# Patient Record
Sex: MALE | Race: WHITE | NOT HISPANIC OR LATINO | Employment: FULL TIME
[De-identification: names, ages, dates, MRNs, and addresses within clinical notes are randomized per-mention and may not be internally consistent; named-entity substitution may affect disease eponyms.]

---

## 2017-01-15 ENCOUNTER — RECORDS - HEALTHEAST (OUTPATIENT)
Dept: ADMINISTRATIVE | Facility: OTHER | Age: 57
End: 2017-01-15

## 2017-01-16 ENCOUNTER — RECORDS - HEALTHEAST (OUTPATIENT)
Dept: ADMINISTRATIVE | Facility: OTHER | Age: 57
End: 2017-01-16

## 2017-01-17 ENCOUNTER — COMMUNICATION - HEALTHEAST (OUTPATIENT)
Dept: FAMILY MEDICINE | Facility: CLINIC | Age: 57
End: 2017-01-17

## 2017-01-19 ENCOUNTER — OFFICE VISIT - HEALTHEAST (OUTPATIENT)
Dept: FAMILY MEDICINE | Facility: CLINIC | Age: 57
End: 2017-01-19

## 2017-01-19 DIAGNOSIS — S22.49XA MULTIPLE RIB FRACTURES: ICD-10-CM

## 2017-01-19 DIAGNOSIS — S42.102A LEFT SCAPULA FRACTURE: ICD-10-CM

## 2017-01-20 ENCOUNTER — RECORDS - HEALTHEAST (OUTPATIENT)
Dept: ADMINISTRATIVE | Facility: OTHER | Age: 57
End: 2017-01-20

## 2017-01-25 ENCOUNTER — COMMUNICATION - HEALTHEAST (OUTPATIENT)
Dept: FAMILY MEDICINE | Facility: CLINIC | Age: 57
End: 2017-01-25

## 2017-02-10 ENCOUNTER — RECORDS - HEALTHEAST (OUTPATIENT)
Dept: ADMINISTRATIVE | Facility: OTHER | Age: 57
End: 2017-02-10

## 2017-03-08 ENCOUNTER — RECORDS - HEALTHEAST (OUTPATIENT)
Dept: ADMINISTRATIVE | Facility: OTHER | Age: 57
End: 2017-03-08

## 2017-05-06 ENCOUNTER — COMMUNICATION - HEALTHEAST (OUTPATIENT)
Dept: FAMILY MEDICINE | Facility: CLINIC | Age: 57
End: 2017-05-06

## 2017-05-06 DIAGNOSIS — I10 HYPERTENSION: ICD-10-CM

## 2017-05-12 ENCOUNTER — AMBULATORY - HEALTHEAST (OUTPATIENT)
Dept: FAMILY MEDICINE | Facility: CLINIC | Age: 57
End: 2017-05-12

## 2017-05-22 ENCOUNTER — OFFICE VISIT - HEALTHEAST (OUTPATIENT)
Dept: FAMILY MEDICINE | Facility: CLINIC | Age: 57
End: 2017-05-22

## 2017-05-22 DIAGNOSIS — I10 ESSENTIAL HYPERTENSION: ICD-10-CM

## 2017-05-22 DIAGNOSIS — Z00.00 HEALTHCARE MAINTENANCE: ICD-10-CM
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
## 2017-05-22 LAB
CHOLEST SERPL-MCNC: 180 MG/DL
FASTING STATUS PATIENT QL REPORTED: NORMAL
HBA1C MFR BLD: 5.7 % (ref 3.5–6)
HDLC SERPL-MCNC: 65 MG/DL
LDLC SERPL CALC-MCNC: 93 MG/DL
PSA SERPL-MCNC: 0.5 NG/ML (ref 0–3.5)
TRIGL SERPL-MCNC: 112 MG/DL

## 2017-05-22 ASSESSMENT — MIFFLIN-ST. JEOR: SCORE: 1616.64

## 2017-05-25 ENCOUNTER — COMMUNICATION - HEALTHEAST (OUTPATIENT)
Dept: FAMILY MEDICINE | Facility: CLINIC | Age: 57
End: 2017-05-25

## 2019-01-31 ENCOUNTER — COMMUNICATION - HEALTHEAST (OUTPATIENT)
Dept: FAMILY MEDICINE | Facility: CLINIC | Age: 59
End: 2019-01-31

## 2019-01-31 DIAGNOSIS — I10 ESSENTIAL HYPERTENSION: ICD-10-CM

## 2019-02-06 ENCOUNTER — OFFICE VISIT - HEALTHEAST (OUTPATIENT)
Dept: FAMILY MEDICINE | Facility: CLINIC | Age: 59
End: 2019-02-06

## 2019-02-06 DIAGNOSIS — I10 ESSENTIAL HYPERTENSION: ICD-10-CM

## 2019-02-06 DIAGNOSIS — Z12.11 SCREEN FOR COLON CANCER: ICD-10-CM

## 2019-02-06 LAB
ANION GAP SERPL CALCULATED.3IONS-SCNC: 9 MMOL/L (ref 5–18)
BUN SERPL-MCNC: 15 MG/DL (ref 8–22)
CALCIUM SERPL-MCNC: 9.9 MG/DL (ref 8.5–10.5)
CHLORIDE BLD-SCNC: 104 MMOL/L (ref 98–107)
CHOLEST SERPL-MCNC: 186 MG/DL
CO2 SERPL-SCNC: 28 MMOL/L (ref 22–31)
CREAT SERPL-MCNC: 0.92 MG/DL (ref 0.7–1.3)
FASTING STATUS PATIENT QL REPORTED: NO
GFR SERPL CREATININE-BSD FRML MDRD: >60 ML/MIN/1.73M2
GLUCOSE BLD-MCNC: 97 MG/DL (ref 70–125)
HBA1C MFR BLD: 5.5 % (ref 3.5–6)
HDLC SERPL-MCNC: 67 MG/DL
LDLC SERPL CALC-MCNC: 98 MG/DL
POTASSIUM BLD-SCNC: 4.1 MMOL/L (ref 3.5–5)
SODIUM SERPL-SCNC: 141 MMOL/L (ref 136–145)
TRIGL SERPL-MCNC: 105 MG/DL

## 2019-02-06 ASSESSMENT — MIFFLIN-ST. JEOR: SCORE: 1661.72

## 2019-02-10 ENCOUNTER — COMMUNICATION - HEALTHEAST (OUTPATIENT)
Dept: FAMILY MEDICINE | Facility: CLINIC | Age: 59
End: 2019-02-10

## 2019-07-10 ENCOUNTER — COMMUNICATION - HEALTHEAST (OUTPATIENT)
Dept: FAMILY MEDICINE | Facility: CLINIC | Age: 59
End: 2019-07-10

## 2019-08-29 ENCOUNTER — COMMUNICATION - HEALTHEAST (OUTPATIENT)
Dept: FAMILY MEDICINE | Facility: CLINIC | Age: 59
End: 2019-08-29

## 2019-08-29 DIAGNOSIS — I10 ESSENTIAL HYPERTENSION: ICD-10-CM

## 2020-01-20 ENCOUNTER — COMMUNICATION - HEALTHEAST (OUTPATIENT)
Dept: FAMILY MEDICINE | Facility: CLINIC | Age: 60
End: 2020-01-20

## 2020-05-27 ENCOUNTER — COMMUNICATION - HEALTHEAST (OUTPATIENT)
Dept: FAMILY MEDICINE | Facility: CLINIC | Age: 60
End: 2020-05-27

## 2020-05-27 DIAGNOSIS — I10 ESSENTIAL HYPERTENSION: ICD-10-CM

## 2020-06-12 ENCOUNTER — OFFICE VISIT - HEALTHEAST (OUTPATIENT)
Dept: FAMILY MEDICINE | Facility: CLINIC | Age: 60
End: 2020-06-12

## 2020-06-12 DIAGNOSIS — I10 ESSENTIAL HYPERTENSION: ICD-10-CM

## 2021-01-14 ENCOUNTER — HOSPITAL ENCOUNTER (OUTPATIENT)
Facility: CLINIC | Age: 61
Setting detail: OBSERVATION
Discharge: HOME | End: 2021-01-15
Attending: FAMILY MEDICINE | Admitting: INTERNAL MEDICINE
Payer: COMMERCIAL

## 2021-01-14 ENCOUNTER — APPOINTMENT (OUTPATIENT)
Dept: GENERAL RADIOLOGY | Facility: CLINIC | Age: 61
End: 2021-01-14
Attending: FAMILY MEDICINE
Payer: COMMERCIAL

## 2021-01-14 ENCOUNTER — APPOINTMENT (OUTPATIENT)
Dept: CT IMAGING | Facility: CLINIC | Age: 61
End: 2021-01-14
Attending: FAMILY MEDICINE
Payer: COMMERCIAL

## 2021-01-14 ENCOUNTER — APPOINTMENT (OUTPATIENT)
Dept: ULTRASOUND IMAGING | Facility: CLINIC | Age: 61
End: 2021-01-14
Attending: FAMILY MEDICINE
Payer: COMMERCIAL

## 2021-01-14 DIAGNOSIS — Z11.52 ENCOUNTER FOR SCREENING LABORATORY TESTING FOR SEVERE ACUTE RESPIRATORY SYNDROME CORONAVIRUS 2 (SARS-COV-2): ICD-10-CM

## 2021-01-14 DIAGNOSIS — K57.92 DIVERTICULITIS: Primary | ICD-10-CM

## 2021-01-14 DIAGNOSIS — K38.2: ICD-10-CM

## 2021-01-14 DIAGNOSIS — F10.20 ACUTE ALCOHOLISM (H): ICD-10-CM

## 2021-01-14 DIAGNOSIS — F10.10 ALCOHOL ABUSE: ICD-10-CM

## 2021-01-14 DIAGNOSIS — K57.32 DIVERTICULITIS OF COLON: ICD-10-CM

## 2021-01-14 PROBLEM — F10.90 ALCOHOL USE: Status: ACTIVE | Noted: 2021-01-14

## 2021-01-14 PROBLEM — S22.49XA MULTIPLE RIB FRACTURES: Status: ACTIVE | Noted: 2017-01-19

## 2021-01-14 PROBLEM — F17.200 NICOTINE DEPENDENCE: Status: ACTIVE | Noted: 2021-01-14

## 2021-01-14 PROBLEM — Z78.9 ALCOHOL USE: Status: ACTIVE | Noted: 2021-01-14

## 2021-01-14 PROBLEM — S42.102A LEFT SCAPULA FRACTURE: Status: ACTIVE | Noted: 2017-01-19

## 2021-01-14 LAB
ALBUMIN SERPL-MCNC: 4.2 G/DL (ref 3.4–5)
ALBUMIN UR-MCNC: NEGATIVE MG/DL
ALP SERPL-CCNC: 95 U/L (ref 40–150)
ALT SERPL W P-5'-P-CCNC: 24 U/L (ref 0–70)
ANION GAP SERPL CALCULATED.3IONS-SCNC: 6 MMOL/L (ref 3–14)
APPEARANCE UR: CLEAR
AST SERPL W P-5'-P-CCNC: 14 U/L (ref 0–45)
BASOPHILS # BLD AUTO: 0.1 10E9/L (ref 0–0.2)
BASOPHILS NFR BLD AUTO: 0.4 %
BILIRUB SERPL-MCNC: 1.2 MG/DL (ref 0.2–1.3)
BILIRUB UR QL STRIP: NEGATIVE
BUN SERPL-MCNC: 17 MG/DL (ref 7–30)
CALCIUM SERPL-MCNC: 9.5 MG/DL (ref 8.5–10.1)
CHLORIDE SERPL-SCNC: 108 MMOL/L (ref 94–109)
CO2 SERPL-SCNC: 25 MMOL/L (ref 20–32)
COLOR UR AUTO: YELLOW
CREAT SERPL-MCNC: 1.12 MG/DL (ref 0.66–1.25)
DIFFERENTIAL METHOD BLD: ABNORMAL
EOSINOPHIL # BLD AUTO: 0.1 10E9/L (ref 0–0.7)
EOSINOPHIL NFR BLD AUTO: 0.8 %
ERYTHROCYTE [DISTWIDTH] IN BLOOD BY AUTOMATED COUNT: 11.7 % (ref 10–15)
ETHANOL SERPL-MCNC: <0.01 G/DL
GFR SERPL CREATININE-BSD FRML MDRD: 72 ML/MIN/{1.73_M2}
GLUCOSE SERPL-MCNC: 111 MG/DL (ref 70–99)
GLUCOSE UR STRIP-MCNC: NEGATIVE MG/DL
HCT VFR BLD AUTO: 42.8 % (ref 40–53)
HGB BLD-MCNC: 14.9 G/DL (ref 13.3–17.7)
HGB UR QL STRIP: NEGATIVE
IMM GRANULOCYTES # BLD: 0.1 10E9/L (ref 0–0.4)
IMM GRANULOCYTES NFR BLD: 0.4 %
KETONES UR STRIP-MCNC: NEGATIVE MG/DL
LABORATORY COMMENT REPORT: NORMAL
LACTATE BLD-SCNC: 0.9 MMOL/L (ref 0.7–2)
LEUKOCYTE ESTERASE UR QL STRIP: NEGATIVE
LIPASE SERPL-CCNC: 204 U/L (ref 73–393)
LYMPHOCYTES # BLD AUTO: 2 10E9/L (ref 0.8–5.3)
LYMPHOCYTES NFR BLD AUTO: 16.4 %
MCH RBC QN AUTO: 34.1 PG (ref 26.5–33)
MCHC RBC AUTO-ENTMCNC: 34.8 G/DL (ref 31.5–36.5)
MCV RBC AUTO: 98 FL (ref 78–100)
MONOCYTES # BLD AUTO: 1 10E9/L (ref 0–1.3)
MONOCYTES NFR BLD AUTO: 8.2 %
MUCOUS THREADS #/AREA URNS LPF: PRESENT /LPF
NEUTROPHILS # BLD AUTO: 8.8 10E9/L (ref 1.6–8.3)
NEUTROPHILS NFR BLD AUTO: 73.8 %
NITRATE UR QL: NEGATIVE
NRBC # BLD AUTO: 0 10*3/UL
NRBC BLD AUTO-RTO: 0 /100
PH UR STRIP: 5 PH (ref 5–7)
PLATELET # BLD AUTO: 210 10E9/L (ref 150–450)
POTASSIUM SERPL-SCNC: 4.3 MMOL/L (ref 3.4–5.3)
PROT SERPL-MCNC: 7.7 G/DL (ref 6.8–8.8)
RBC # BLD AUTO: 4.37 10E12/L (ref 4.4–5.9)
RBC #/AREA URNS AUTO: <1 /HPF (ref 0–2)
SARS-COV-2 RNA RESP QL NAA+PROBE: NEGATIVE
SODIUM SERPL-SCNC: 139 MMOL/L (ref 133–144)
SOURCE: ABNORMAL
SP GR UR STRIP: 1.02 (ref 1–1.03)
SPECIMEN SOURCE: NORMAL
SQUAMOUS #/AREA URNS AUTO: <1 /HPF (ref 0–1)
UROBILINOGEN UR STRIP-MCNC: 0 MG/DL (ref 0–2)
WBC # BLD AUTO: 12 10E9/L (ref 4–11)
WBC #/AREA URNS AUTO: <1 /HPF (ref 0–5)

## 2021-01-14 PROCEDURE — G0378 HOSPITAL OBSERVATION PER HR: HCPCS

## 2021-01-14 PROCEDURE — 99285 EMERGENCY DEPT VISIT HI MDM: CPT | Mod: 25 | Performed by: FAMILY MEDICINE

## 2021-01-14 PROCEDURE — 81001 URINALYSIS AUTO W/SCOPE: CPT | Performed by: FAMILY MEDICINE

## 2021-01-14 PROCEDURE — 85025 COMPLETE CBC W/AUTO DIFF WBC: CPT | Performed by: FAMILY MEDICINE

## 2021-01-14 PROCEDURE — 250N000013 HC RX MED GY IP 250 OP 250 PS 637: Performed by: FAMILY MEDICINE

## 2021-01-14 PROCEDURE — 76705 ECHO EXAM OF ABDOMEN: CPT

## 2021-01-14 PROCEDURE — 83605 ASSAY OF LACTIC ACID: CPT | Performed by: FAMILY MEDICINE

## 2021-01-14 PROCEDURE — 250N000011 HC RX IP 250 OP 636: Performed by: FAMILY MEDICINE

## 2021-01-14 PROCEDURE — 99220 PR INITIAL OBSERVATION CARE,LEVEL III: CPT | Performed by: PHYSICIAN ASSISTANT

## 2021-01-14 PROCEDURE — 71046 X-RAY EXAM CHEST 2 VIEWS: CPT

## 2021-01-14 PROCEDURE — 96376 TX/PRO/DX INJ SAME DRUG ADON: CPT

## 2021-01-14 PROCEDURE — 258N000003 HC RX IP 258 OP 636: Performed by: PHYSICIAN ASSISTANT

## 2021-01-14 PROCEDURE — 83690 ASSAY OF LIPASE: CPT | Performed by: FAMILY MEDICINE

## 2021-01-14 PROCEDURE — 250N000009 HC RX 250: Performed by: FAMILY MEDICINE

## 2021-01-14 PROCEDURE — C9803 HOPD COVID-19 SPEC COLLECT: HCPCS | Performed by: FAMILY MEDICINE

## 2021-01-14 PROCEDURE — 80053 COMPREHEN METABOLIC PANEL: CPT | Performed by: FAMILY MEDICINE

## 2021-01-14 PROCEDURE — 96375 TX/PRO/DX INJ NEW DRUG ADDON: CPT

## 2021-01-14 PROCEDURE — 74177 CT ABD & PELVIS W/CONTRAST: CPT

## 2021-01-14 PROCEDURE — 82077 ASSAY SPEC XCP UR&BREATH IA: CPT | Performed by: FAMILY MEDICINE

## 2021-01-14 PROCEDURE — 93010 ELECTROCARDIOGRAM REPORT: CPT | Performed by: FAMILY MEDICINE

## 2021-01-14 PROCEDURE — 250N000013 HC RX MED GY IP 250 OP 250 PS 637: Performed by: PHYSICIAN ASSISTANT

## 2021-01-14 PROCEDURE — 87635 SARS-COV-2 COVID-19 AMP PRB: CPT | Performed by: FAMILY MEDICINE

## 2021-01-14 PROCEDURE — 96365 THER/PROPH/DIAG IV INF INIT: CPT | Mod: 59 | Performed by: FAMILY MEDICINE

## 2021-01-14 PROCEDURE — 93005 ELECTROCARDIOGRAM TRACING: CPT | Performed by: FAMILY MEDICINE

## 2021-01-14 PROCEDURE — 99203 OFFICE O/P NEW LOW 30 MIN: CPT | Performed by: SURGERY

## 2021-01-14 RX ORDER — FLUMAZENIL 0.1 MG/ML
0.2 INJECTION, SOLUTION INTRAVENOUS
Status: CANCELLED | OUTPATIENT
Start: 2021-01-14

## 2021-01-14 RX ORDER — DIAZEPAM 10 MG/2ML
5-10 INJECTION, SOLUTION INTRAMUSCULAR; INTRAVENOUS EVERY 30 MIN PRN
Status: CANCELLED | OUTPATIENT
Start: 2021-01-14

## 2021-01-14 RX ORDER — MULTIPLE VITAMINS W/ MINERALS TAB 9MG-400MCG
1 TAB ORAL DAILY
Status: CANCELLED | OUTPATIENT
Start: 2021-01-14

## 2021-01-14 RX ORDER — DIAZEPAM 5 MG
10 TABLET ORAL EVERY 30 MIN PRN
Status: DISCONTINUED | OUTPATIENT
Start: 2021-01-14 | End: 2021-01-15 | Stop reason: HOSPADM

## 2021-01-14 RX ORDER — ACETAMINOPHEN 325 MG/1
650 TABLET ORAL EVERY 4 HOURS PRN
Status: DISCONTINUED | OUTPATIENT
Start: 2021-01-14 | End: 2021-01-15 | Stop reason: HOSPADM

## 2021-01-14 RX ORDER — LANOLIN ALCOHOL/MO/W.PET/CERES
200 CREAM (GRAM) TOPICAL 3 TIMES DAILY
Status: DISCONTINUED | OUTPATIENT
Start: 2021-01-14 | End: 2021-01-15 | Stop reason: HOSPADM

## 2021-01-14 RX ORDER — SODIUM CHLORIDE, SODIUM LACTATE, POTASSIUM CHLORIDE, CALCIUM CHLORIDE 600; 310; 30; 20 MG/100ML; MG/100ML; MG/100ML; MG/100ML
INJECTION, SOLUTION INTRAVENOUS CONTINUOUS
Status: DISCONTINUED | OUTPATIENT
Start: 2021-01-14 | End: 2021-01-15

## 2021-01-14 RX ORDER — HYDROMORPHONE HYDROCHLORIDE 1 MG/ML
0.2 INJECTION, SOLUTION INTRAMUSCULAR; INTRAVENOUS; SUBCUTANEOUS
Status: DISCONTINUED | OUTPATIENT
Start: 2021-01-14 | End: 2021-01-15 | Stop reason: HOSPADM

## 2021-01-14 RX ORDER — PROCHLORPERAZINE MALEATE 5 MG
10 TABLET ORAL EVERY 6 HOURS PRN
Status: DISCONTINUED | OUTPATIENT
Start: 2021-01-14 | End: 2021-01-15 | Stop reason: HOSPADM

## 2021-01-14 RX ORDER — HYDROMORPHONE HYDROCHLORIDE 1 MG/ML
0.5 INJECTION, SOLUTION INTRAMUSCULAR; INTRAVENOUS; SUBCUTANEOUS
Status: DISCONTINUED | OUTPATIENT
Start: 2021-01-14 | End: 2021-01-14

## 2021-01-14 RX ORDER — DIAZEPAM 10 MG/2ML
5-10 INJECTION, SOLUTION INTRAMUSCULAR; INTRAVENOUS EVERY 30 MIN PRN
Status: DISCONTINUED | OUTPATIENT
Start: 2021-01-14 | End: 2021-01-15 | Stop reason: HOSPADM

## 2021-01-14 RX ORDER — ONDANSETRON 2 MG/ML
4 INJECTION INTRAMUSCULAR; INTRAVENOUS EVERY 6 HOURS PRN
Status: DISCONTINUED | OUTPATIENT
Start: 2021-01-14 | End: 2021-01-15 | Stop reason: HOSPADM

## 2021-01-14 RX ORDER — FOLIC ACID 1 MG/1
1 TABLET ORAL DAILY
Status: CANCELLED | OUTPATIENT
Start: 2021-01-14

## 2021-01-14 RX ORDER — LANOLIN ALCOHOL/MO/W.PET/CERES
100 CREAM (GRAM) TOPICAL 3 TIMES DAILY
Status: CANCELLED | OUTPATIENT
Start: 2021-01-16 | End: 2021-01-21

## 2021-01-14 RX ORDER — FOLIC ACID 1 MG/1
1 TABLET ORAL ONCE
Status: COMPLETED | OUTPATIENT
Start: 2021-01-14 | End: 2021-01-14

## 2021-01-14 RX ORDER — FLUMAZENIL 0.1 MG/ML
0.2 INJECTION, SOLUTION INTRAVENOUS
Status: DISCONTINUED | OUTPATIENT
Start: 2021-01-14 | End: 2021-01-15 | Stop reason: HOSPADM

## 2021-01-14 RX ORDER — AMPICILLIN AND SULBACTAM 2; 1 G/1; G/1
3 INJECTION, POWDER, FOR SOLUTION INTRAMUSCULAR; INTRAVENOUS ONCE
Status: COMPLETED | OUTPATIENT
Start: 2021-01-14 | End: 2021-01-14

## 2021-01-14 RX ORDER — LISINOPRIL 10 MG/1
10 TABLET ORAL DAILY
Status: DISCONTINUED | OUTPATIENT
Start: 2021-01-14 | End: 2021-01-15 | Stop reason: HOSPADM

## 2021-01-14 RX ORDER — LANOLIN ALCOHOL/MO/W.PET/CERES
200 CREAM (GRAM) TOPICAL 3 TIMES DAILY
Status: CANCELLED | OUTPATIENT
Start: 2021-01-14 | End: 2021-01-16

## 2021-01-14 RX ORDER — LANOLIN ALCOHOL/MO/W.PET/CERES
100 CREAM (GRAM) TOPICAL DAILY
Status: CANCELLED | OUTPATIENT
Start: 2021-01-22

## 2021-01-14 RX ORDER — NALOXONE HYDROCHLORIDE 0.4 MG/ML
0.4 INJECTION, SOLUTION INTRAMUSCULAR; INTRAVENOUS; SUBCUTANEOUS
Status: DISCONTINUED | OUTPATIENT
Start: 2021-01-14 | End: 2021-01-15 | Stop reason: HOSPADM

## 2021-01-14 RX ORDER — MULTIPLE VITAMINS W/ MINERALS TAB 9MG-400MCG
1 TAB ORAL DAILY
Status: DISCONTINUED | OUTPATIENT
Start: 2021-01-15 | End: 2021-01-15 | Stop reason: HOSPADM

## 2021-01-14 RX ORDER — AMPICILLIN AND SULBACTAM 1; .5 G/1; G/1
1.5 INJECTION, POWDER, FOR SOLUTION INTRAMUSCULAR; INTRAVENOUS EVERY 6 HOURS
Status: DISCONTINUED | OUTPATIENT
Start: 2021-01-14 | End: 2021-01-15 | Stop reason: HOSPADM

## 2021-01-14 RX ORDER — DIAZEPAM 5 MG
10 TABLET ORAL EVERY 30 MIN PRN
Status: CANCELLED | OUTPATIENT
Start: 2021-01-14

## 2021-01-14 RX ORDER — OXYCODONE HYDROCHLORIDE 5 MG/1
5-10 TABLET ORAL
Status: DISCONTINUED | OUTPATIENT
Start: 2021-01-14 | End: 2021-01-15 | Stop reason: HOSPADM

## 2021-01-14 RX ORDER — FOLIC ACID 1 MG/1
1 TABLET ORAL DAILY
Status: DISCONTINUED | OUTPATIENT
Start: 2021-01-15 | End: 2021-01-15 | Stop reason: HOSPADM

## 2021-01-14 RX ORDER — MULTIVITAMIN,THERAPEUTIC
1 TABLET ORAL ONCE
Status: COMPLETED | OUTPATIENT
Start: 2021-01-14 | End: 2021-01-14

## 2021-01-14 RX ORDER — ACETAMINOPHEN 650 MG/1
650 SUPPOSITORY RECTAL EVERY 4 HOURS PRN
Status: DISCONTINUED | OUTPATIENT
Start: 2021-01-14 | End: 2021-01-15 | Stop reason: HOSPADM

## 2021-01-14 RX ORDER — NALOXONE HYDROCHLORIDE 0.4 MG/ML
0.2 INJECTION, SOLUTION INTRAMUSCULAR; INTRAVENOUS; SUBCUTANEOUS
Status: DISCONTINUED | OUTPATIENT
Start: 2021-01-14 | End: 2021-01-15 | Stop reason: HOSPADM

## 2021-01-14 RX ORDER — LANOLIN ALCOHOL/MO/W.PET/CERES
100 CREAM (GRAM) TOPICAL DAILY
Status: DISCONTINUED | OUTPATIENT
Start: 2021-01-22 | End: 2021-01-15 | Stop reason: HOSPADM

## 2021-01-14 RX ORDER — LANOLIN ALCOHOL/MO/W.PET/CERES
100 CREAM (GRAM) TOPICAL ONCE
Status: COMPLETED | OUTPATIENT
Start: 2021-01-14 | End: 2021-01-14

## 2021-01-14 RX ORDER — ONDANSETRON 4 MG/1
4 TABLET, ORALLY DISINTEGRATING ORAL EVERY 6 HOURS PRN
Status: DISCONTINUED | OUTPATIENT
Start: 2021-01-14 | End: 2021-01-15 | Stop reason: HOSPADM

## 2021-01-14 RX ORDER — PROCHLORPERAZINE 25 MG
25 SUPPOSITORY, RECTAL RECTAL EVERY 12 HOURS PRN
Status: DISCONTINUED | OUTPATIENT
Start: 2021-01-14 | End: 2021-01-15 | Stop reason: HOSPADM

## 2021-01-14 RX ORDER — MAGNESIUM OXIDE 400 MG/1
800 TABLET ORAL ONCE
Status: COMPLETED | OUTPATIENT
Start: 2021-01-14 | End: 2021-01-14

## 2021-01-14 RX ORDER — LANOLIN ALCOHOL/MO/W.PET/CERES
100 CREAM (GRAM) TOPICAL 3 TIMES DAILY
Status: DISCONTINUED | OUTPATIENT
Start: 2021-01-16 | End: 2021-01-15 | Stop reason: HOSPADM

## 2021-01-14 RX ORDER — IOPAMIDOL 755 MG/ML
89 INJECTION, SOLUTION INTRAVASCULAR ONCE
Status: COMPLETED | OUTPATIENT
Start: 2021-01-14 | End: 2021-01-14

## 2021-01-14 RX ORDER — LISINOPRIL 10 MG/1
10 TABLET ORAL DAILY
COMMUNITY
Start: 2020-12-06 | End: 2022-01-19

## 2021-01-14 RX ADMIN — THIAMINE HCL TAB 100 MG 100 MG: 100 TAB at 10:11

## 2021-01-14 RX ADMIN — TAZOBACTAM SODIUM AND PIPERACILLIN SODIUM 4.5 G: 500; 4 INJECTION, SOLUTION INTRAVENOUS at 11:44

## 2021-01-14 RX ADMIN — MULTIVITAMIN TABLET 1 TABLET: TABLET at 10:11

## 2021-01-14 RX ADMIN — AMPICILLIN SODIUM AND SULBACTAM SODIUM 1.5 G: 1; .5 INJECTION, POWDER, FOR SOLUTION INTRAMUSCULAR; INTRAVENOUS at 20:25

## 2021-01-14 RX ADMIN — OXYCODONE HYDROCHLORIDE 5 MG: 5 TABLET ORAL at 14:40

## 2021-01-14 RX ADMIN — SODIUM CHLORIDE, POTASSIUM CHLORIDE, SODIUM LACTATE AND CALCIUM CHLORIDE: 600; 310; 30; 20 INJECTION, SOLUTION INTRAVENOUS at 14:41

## 2021-01-14 RX ADMIN — LISINOPRIL 10 MG: 10 TABLET ORAL at 16:31

## 2021-01-14 RX ADMIN — THIAMINE HCL TAB 100 MG 200 MG: 100 TAB at 19:47

## 2021-01-14 RX ADMIN — FOLIC ACID 1 MG: 1 TABLET ORAL at 10:11

## 2021-01-14 RX ADMIN — SODIUM CHLORIDE 62 ML: 9 INJECTION, SOLUTION INTRAVENOUS at 10:38

## 2021-01-14 RX ADMIN — Medication 800 MG: at 10:11

## 2021-01-14 RX ADMIN — IOPAMIDOL 89 ML: 755 INJECTION, SOLUTION INTRAVENOUS at 10:38

## 2021-01-14 RX ADMIN — AMPICILLIN SODIUM AND SULBACTAM SODIUM 3 G: 2; 1 INJECTION, POWDER, FOR SOLUTION INTRAMUSCULAR; INTRAVENOUS at 14:41

## 2021-01-14 RX ADMIN — THIAMINE HCL TAB 100 MG 200 MG: 100 TAB at 14:40

## 2021-01-14 SDOH — HEALTH STABILITY: MENTAL HEALTH: HOW OFTEN DO YOU HAVE A DRINK CONTAINING ALCOHOL?: NOT ASKED

## 2021-01-14 SDOH — HEALTH STABILITY: MENTAL HEALTH: HOW OFTEN DO YOU HAVE 6 OR MORE DRINKS ON ONE OCCASION?: NOT ASKED

## 2021-01-14 SDOH — HEALTH STABILITY: MENTAL HEALTH: HOW MANY STANDARD DRINKS CONTAINING ALCOHOL DO YOU HAVE ON A TYPICAL DAY?: NOT ASKED

## 2021-01-14 ASSESSMENT — ENCOUNTER SYMPTOMS
SINUS PRESSURE: 0
BLOOD IN STOOL: 0
VOMITING: 0
CHILLS: 0
FREQUENCY: 0
CONSTIPATION: 0
COUGH: 0
DYSURIA: 0
ABDOMINAL PAIN: 1
FEVER: 0
DIAPHORESIS: 0
DIARRHEA: 1
HEADACHES: 0
SORE THROAT: 0
NAUSEA: 1
PALPITATIONS: 0
WHEEZING: 0
SHORTNESS OF BREATH: 0

## 2021-01-14 ASSESSMENT — MIFFLIN-ST. JEOR
SCORE: 1602.5
SCORE: 1610.97
SCORE: 1628.5

## 2021-01-14 NOTE — CONSULTS
"Surgical Consultation/History and Physical  [facility]    Lan is seen in consultation for Abdominal pain, at the request of Dr. Jeffery    Chief Complaint:  Abdominal Pain    History of Present Illness: Lan Manuel is a 60 year old male presents with abdominal pain.  Patient has pain which began 48 hours prior to admission and gradually intensified to the point which it was difficult to get out of bed this morning.  He has not had any nausea nor vomiting.  He has never had this pain prior.  He notes looser, more mucus like stool since onset of symptoms.  He admits chills this morning.  No subjective fevers.  Denies blood in stool.  He has never had a colonoscopy.  Denies family history of colon cancer, ulcerative colitis, Crohn's or polyps.      He admits to daily drinking, 8+ a night.  Last period of sustained sobriety 15 years ago.  No known history of withdrawal symptoms.      He admits to daily smoking.  No desire to quit at this time.      Patient Active Problem List   Diagnosis     Hypertension     Multiple rib fractures     Nicotine dependence     Left scapula fracture     Diverticulitis     Diverticulitis of colon     Appendicular diverticulum       No past medical history on file.    No past surgical history on file.    No family history on file.    Social History     Tobacco Use     Smoking status: Not on file   Substance Use Topics     Alcohol use: Not on file        History   Drug Use Not on file       No current outpatient medications on file.       No Known Allergies    Review of Systems:   10 point ROS otherwise negative    Physical Exam:  BP (!) 141/95   Pulse 76   Temp 98.2  F (36.8  C) (Temporal)   Resp 20   Ht 1.727 m (5' 8\")   Wt 81.6 kg (180 lb)   SpO2 97%   BMI 27.37 kg/m      Constitutional- No acute distress, well nourished, non-toxic  Eyes: Anicteric, no injection.  PERRL  ENT:  Normocephalic, atraumatic, Nose midline, moist mucus membranes  Neck - supple, " no LAD  Respiratory- Clear to auscultation bilaterally, good inspiratory effort  Cardiovascular - Heart RRR, no lift's, thrills, murmurs, rubs, or gallop.  No peripheral edema.  No clubbing.  Abdomen - Soft, moderate TTP RLQ without rigidity, rebound, guarding.  Reducible umbilical hernia.  + bowel sounds  Neuro - No focal neuro deficits, Alert and oriented x 3  Psych: Appropriate mood and affect  Musculoskeletal: Normal gait, symmetric strength.  FROM upper and lower extremities.  Skin: Warm, Dry    Lab Results   Component Value Date    WBC 12.0 01/14/2021     Lab Results   Component Value Date    RBC 4.37 01/14/2021     Lab Results   Component Value Date    HGB 14.9 01/14/2021     Lab Results   Component Value Date    HCT 42.8 01/14/2021     Lab Results   Component Value Date    MCV 98 01/14/2021     Lab Results   Component Value Date    MCH 34.1 01/14/2021     Lab Results   Component Value Date    MCHC 34.8 01/14/2021     Lab Results   Component Value Date    RDW 11.7 01/14/2021     Lab Results   Component Value Date     01/14/2021     Last Comprehensive Metabolic Panel:  Sodium   Date Value Ref Range Status   01/14/2021 139 133 - 144 mmol/L Final     Potassium   Date Value Ref Range Status   01/14/2021 4.3 3.4 - 5.3 mmol/L Final     Chloride   Date Value Ref Range Status   01/14/2021 108 94 - 109 mmol/L Final     Carbon Dioxide   Date Value Ref Range Status   01/14/2021 25 20 - 32 mmol/L Final     Anion Gap   Date Value Ref Range Status   01/14/2021 6 3 - 14 mmol/L Final     Glucose   Date Value Ref Range Status   01/14/2021 111 (H) 70 - 99 mg/dL Final     Urea Nitrogen   Date Value Ref Range Status   01/14/2021 17 7 - 30 mg/dL Final     Creatinine   Date Value Ref Range Status   01/14/2021 1.12 0.66 - 1.25 mg/dL Final     GFR Estimate   Date Value Ref Range Status   01/14/2021 72 >60 mL/min/[1.73_m2] Final     Comment:     Non  GFR Calc  Starting 12/18/2018, serum creatinine based  estimated GFR (eGFR) will be   calculated using the Chronic Kidney Disease Epidemiology Collaboration   (CKD-EPI) equation.       Calcium   Date Value Ref Range Status   01/14/2021 9.5 8.5 - 10.1 mg/dL Final     Bilirubin Total   Date Value Ref Range Status   01/14/2021 1.2 0.2 - 1.3 mg/dL Final     Alkaline Phosphatase   Date Value Ref Range Status   01/14/2021 95 40 - 150 U/L Final     ALT   Date Value Ref Range Status   01/14/2021 24 0 - 70 U/L Final     AST   Date Value Ref Range Status   01/14/2021 14 0 - 45 U/L Final     Lipase   Date Value Ref Range Status   01/14/2021 204 73 - 393 U/L Final     CT Abdomen/Pelvis:  FINDINGS:  Lower chest: Mild bibasilar pulmonary opacities, likely atelectasis. A  few old left rib fractures are noted. Small hiatal hernia.     Abdomen/pelvis: No suspicious focal hepatic lesion. No radiodense  gallstones. No main pancreatic ductal dilatation or definite solid  pancreatic mass. No splenomegaly. No adrenal nodules. No radiodense  kidney stones or hydronephrosis in either kidney.     No abnormally dilated bowel loops. The appendix is visualized and  appears nondilated; however, with multiple diverticula arising from  the appendix. There is moderate size diverticulum arising from the  cecum with peridiverticular fat stranding, findings likely represent  acute diverticulitis. No evidence of colonic perforation or  pericolonic loculated collection. Moderate atherosclerotic vascular  calcification of the abdominal aorta and iliac vessels.     Bones and soft tissues: Multilevel degenerative changes of the spine.  No suspicious osseous lesion.                                                                      IMPRESSION: Colonic diverticulosis including diverticulosis of the  cecum with peridiverticular fat stranding along the cecal  diverticulum. Findings worrisome for acute diverticulitis. No evidence  of colonic perforation or pericolonic loculated collection. Adjacent  to the  inflamed diverticulum, the appendix is visualized and appears  not dilated; however, but contains multiple appendiceal diverticula.     Findings were discussed with the ordering provider, JIGNESH SULLIVAN, at  11:26 AM on 1/14/2021.    Assessment:  1. Cecal diverticulitis  2. Appendiceal diverticulosis  3. Tobacco dependence  4. Alcohol dependence  5. Reducible umbilical hernia    Plan:   1. Mr. Manuel presents with acute cecal diverticulitis, first episode.  His abdomen is benign at this time and vitals are stable.  I reviewed his imaging and expected hospital course with him.  He will be admitted to the medicine service for observation overnight with parenteral antibiotics (Unasyn) and pain control.  Clear liquid diet is okay today.  If he continues to improve, will plan for outpatient management with oral antibiotics and follow-up as an outpatient.  I explained he will need colonoscopy to rule out underlying malignancy though on CT no overt findings to suggest malignancy as underlying cause.  I discussed the potential need for surgery for his condition which may amount to a partial cecectomy, ileocecectomy, up to right hemicolectomy.  I advised him to monitor for worsening pain and bloating and to please let the team know as soon as possible.  He expressed understanding.    Patient will need to be on CIWA protocol.      Counseled him on tobacco cessation    Will follow patient in hospital.    Beau Saavedra, DO on 1/14/2021 at 12:08 PM

## 2021-01-14 NOTE — PLAN OF CARE
"[facility] [facility] [place] ADMISSION NOTE    Patient admitted to room 2213 at approximately 1340 via wheel chair from emergency room. Patient was accompanied by transport tech.     Verbal SBAR report received from RENETTA aT prior to patient arrival.     Patient ambulated to bed independently. Patient alert and oriented X 3. Pain is controlled without any medications.  . Admission vital signs: Blood pressure (!) 144/92, pulse 74, temperature 99.3  F (37.4  C), temperature source Oral, resp. rate 18, height 1.727 m (5' 8\"), weight 80.8 kg (178 lb 2.1 oz), SpO2 96 %. Patient was oriented to plan of care, call light, bed controls, tv, telephone, bathroom and visiting hours.     Risk Assessment    The following safety risks were identified during admission: none. Yellow risk band applied: NO.     Skin Initial Assessment    Patient declined skin assessment         Education    Patient has a [place] to Observation order: Yes  Observation education completed and documented: Yes      Noa Dominguez RN    "

## 2021-01-14 NOTE — ED NOTES
Patient has  [place] to Observation  order. Patient has been given Patient Bill of Rights, Observation brochure and  What does Observation mean to me  forms.  Patient has been given the opportunity to ask questions about observation status and their plan of care. included wife in the conversation, on speakerphone    Christy Sullivan RN

## 2021-01-14 NOTE — ED PROVIDER NOTES
History     Chief Complaint   Patient presents with     Abdominal Pain     right upper quadrant. onset tuesday morning liquid stools, denies diarrhea or constipation.     HPI  Lan Manuel is a 60 year old male who presents with right upper quadrant abdominal pain with some liquid stools on Tuesday.  No diarrhea or constipation.  Onset of symptoms on Tuesday morning.  Since that time he has had persistent right upper quadrant pain worse with deep breathing and associated with a mixed consistency stool.  No blood in the stool or black tarry stools.  No known history of peptic ulcer disease but he does use excessive alcohol approximately 6-8 drinks per day every day.  He uses tobacco still.  No other substances.  He has no history of abdominal surgery.  No prior known gallbladder disease, peptic ulcer disease.    He is unsure if fatty foods worsen his pain.  He has no exertional symptoms.  Cough will make it worse but he has no significant cough currently.  He has no significant dyspnea.  No chest pain.  Nausea without vomiting.  Worse today and was associated with sweats when he was at work.  Arrived here by private vehicle driven by his wife.          Allergies:  No Known Allergies    Problem List:    Patient Active Problem List    Diagnosis Date Noted     Nicotine dependence 01/14/2021     Priority: Medium     Created by Conversion       Multiple rib fractures 01/19/2017     Priority: Medium     Left scapula fracture 01/19/2017     Priority: Medium     Hypertension 08/09/2015     Priority: Medium        Past Medical History:    No past medical history on file.    Past Surgical History:    No past surgical history on file.    Family History:    No family history on file.    Social History:  Marital Status:   [2]  Social History     Tobacco Use     Smoking status: Not on file   Substance Use Topics     Alcohol use: Not on file     Drug use: Not on file        Medications:    No current outpatient  "medications on file.        Review of Systems   Constitutional: Negative for chills, diaphoresis and fever.   HENT: Negative for ear pain, sinus pressure and sore throat.    Eyes: Negative for visual disturbance.   Respiratory: Negative for cough, shortness of breath and wheezing.    Cardiovascular: Negative for chest pain and palpitations.   Gastrointestinal: Positive for abdominal pain, diarrhea and nausea. Negative for blood in stool, constipation and vomiting.   Genitourinary: Negative for dysuria, frequency and urgency.   Skin: Negative for rash.   Neurological: Negative for headaches.   All other systems reviewed and are negative.      Physical Exam   BP: (!) 167/103  Pulse: 88  Temp: 98.2  F (36.8  C)  Resp: 20  Height: 172.7 cm (5' 8\")  Weight: 81.6 kg (180 lb)  SpO2: 97 %      Physical Exam  Constitutional:       General: He is in acute distress.      Appearance: He is not diaphoretic.   Eyes:      Conjunctiva/sclera: Conjunctivae normal.   Neck:      Musculoskeletal: Neck supple.   Cardiovascular:      Rate and Rhythm: Normal rate and regular rhythm.      Heart sounds: No murmur.   Pulmonary:      Effort: No respiratory distress.      Breath sounds: No stridor. No wheezing or rhonchi.   Abdominal:      General: Bowel sounds are normal. There is no distension.      Palpations: There is no mass.      Tenderness: There is abdominal tenderness in the right upper quadrant. There is no right CVA tenderness or left CVA tenderness.   Musculoskeletal:      Right lower leg: No edema.      Left lower leg: No edema.   Skin:     Coloration: Skin is not jaundiced.      Findings: No rash.   Neurological:      General: No focal deficit present.      Mental Status: He is alert.         ED Course        Procedures                 EKG Interpretation:      Interpreted by Frederick Jeffery MD  EKG done at 1011 hrs. demonstrates a sinus rhythm at 73 bpm normal axis no ST change.  No T wave changes.  Normal R progression.  No Q " waves.  Normal intervals.  Normal conduction.  No ectopy.  Impression sinus rhythm 73 bpm      Critical Care time:  none               Results for orders placed or performed during the hospital encounter of 01/14/21 (from the past 24 hour(s))   CBC with platelets differential   Result Value Ref Range    WBC 12.0 (H) 4.0 - 11.0 10e9/L    RBC Count 4.37 (L) 4.4 - 5.9 10e12/L    Hemoglobin 14.9 13.3 - 17.7 g/dL    Hematocrit 42.8 40.0 - 53.0 %    MCV 98 78 - 100 fl    MCH 34.1 (H) 26.5 - 33.0 pg    MCHC 34.8 31.5 - 36.5 g/dL    RDW 11.7 10.0 - 15.0 %    Platelet Count 210 150 - 450 10e9/L    Diff Method Automated Method     % Neutrophils 73.8 %    % Lymphocytes 16.4 %    % Monocytes 8.2 %    % Eosinophils 0.8 %    % Basophils 0.4 %    % Immature Granulocytes 0.4 %    Nucleated RBCs 0 0 /100    Absolute Neutrophil 8.8 (H) 1.6 - 8.3 10e9/L    Absolute Lymphocytes 2.0 0.8 - 5.3 10e9/L    Absolute Monocytes 1.0 0.0 - 1.3 10e9/L    Absolute Eosinophils 0.1 0.0 - 0.7 10e9/L    Absolute Basophils 0.1 0.0 - 0.2 10e9/L    Abs Immature Granulocytes 0.1 0 - 0.4 10e9/L    Absolute Nucleated RBC 0.0    Comprehensive metabolic panel   Result Value Ref Range    Sodium 139 133 - 144 mmol/L    Potassium 4.3 3.4 - 5.3 mmol/L    Chloride 108 94 - 109 mmol/L    Carbon Dioxide 25 20 - 32 mmol/L    Anion Gap 6 3 - 14 mmol/L    Glucose 111 (H) 70 - 99 mg/dL    Urea Nitrogen 17 7 - 30 mg/dL    Creatinine 1.12 0.66 - 1.25 mg/dL    GFR Estimate 72 >60 mL/min/[1.73_m2]    GFR Estimate If Black 83 >60 mL/min/[1.73_m2]    Calcium 9.5 8.5 - 10.1 mg/dL    Bilirubin Total 1.2 0.2 - 1.3 mg/dL    Albumin 4.2 3.4 - 5.0 g/dL    Protein Total 7.7 6.8 - 8.8 g/dL    Alkaline Phosphatase 95 40 - 150 U/L    ALT 24 0 - 70 U/L    AST 14 0 - 45 U/L   Lactic acid whole blood   Result Value Ref Range    Lactic Acid 0.9 0.7 - 2.0 mmol/L   Lipase   Result Value Ref Range    Lipase 204 73 - 393 U/L   Alcohol ethyl   Result Value Ref Range    Ethanol g/dL <0.01 <0.01  g/dL   Abdomen US, limited (RUQ only)    Narrative    ULTRASOUND ABDOMEN LIMITED 1/14/2021 9:46 AM    CLINICAL HISTORY: Right upper quadrant abdominal pain, as above.    TECHNIQUE: Limited abdominal ultrasound.    COMPARISON: None.    FINDINGS:    GALLBLADDER: Echogenic focus along the gallbladder wall likely  reflects a gallbladder polyp measuring 0.5 cm. No associated posterior  acoustic shadowing. Additional smaller lesion noted near the  gallbladder neck. No gallstones or gallbladder wall thickening.    BILE DUCTS: There is no biliary dilatation. The common duct measures 3  mm.    LIVER: The liver is increased in echogenicity without focal mass.     RIGHT KIDNEY: Unremarkable.    PANCREAS: The visualized portions of the pancreas are normal.    No ascites.      Impression    IMPRESSION:  1.  Probable small gallbladder polyps. No gallstones or bile duct  dilatation.  2.  Fatty infiltration of the liver.    TRAVON MEDINA MD   Chest XR,  PA & LAT    Narrative    CHEST TWO VIEWS    1/14/2021 9:51 AM     HISTORY: Right upper quadrant abdominal pain, evaluate for referred  pain from lower chest.       COMPARISON: None available      Impression    IMPRESSION: PA and lateral views of the chest were obtained. Cardiac  mediastinal silhouette is within normal limits. No suspicious focal  pulmonary opacities. No significant pleural effusion or pneumothorax.    MACK RHDOES MD   CT Abdomen Pelvis w Contrast    Narrative    CT ABDOMEN AND PELVIS WITH CONTRAST   1/14/2021 10:51 AM     HISTORY: Right abdominal pain. Non-diagnostic right upper quadrant  ultrasound. Evaluate for small bowel obstruction, appendicitis.    TECHNIQUE:  CT abdomen and pelvis with 89 mL Isovue-370 IV. Radiation  dose for this scan was reduced using automated exposure control,  adjustment of the mA and/or kV according to patient size, or iterative  reconstruction technique.    COMPARISON: Abdominal ultrasound on same day earlier.    FINDINGS:  Lower  chest: Mild bibasilar pulmonary opacities, likely atelectasis. A  few old left rib fractures are noted. Small hiatal hernia.    Abdomen/pelvis: No suspicious focal hepatic lesion. No radiodense  gallstones. No main pancreatic ductal dilatation or definite solid  pancreatic mass. No splenomegaly. No adrenal nodules. No radiodense  kidney stones or hydronephrosis in either kidney.    No abnormally dilated bowel loops. The appendix is visualized and  appears nondilated; however, with multiple diverticula arising from  the appendix. There is moderate size diverticulum arising from the  cecum with peridiverticular fat stranding, findings likely represent  acute diverticulitis. No evidence of colonic perforation or  pericolonic loculated collection. Moderate atherosclerotic vascular  calcification of the abdominal aorta and iliac vessels.    Bones and soft tissues: Multilevel degenerative changes of the spine.  No suspicious osseous lesion.      Impression    IMPRESSION: Colonic diverticulosis including diverticulosis of the  cecum with peridiverticular fat stranding along the cecal  diverticulum. Findings worrisome for acute diverticulitis. No evidence  of colonic perforation or pericolonic loculated collection. Adjacent  to the inflamed diverticulum, the appendix is visualized and appears  not dilated; however, but contains multiple appendiceal diverticula.    Findings were discussed with the ordering provider, JIGNESH SULLIVAN, at  11:26 AM on 1/14/2021.     Asymptomatic SARS-CoV-2 COVID-19 Virus (Coronavirus) by PCR    Specimen: Nasopharyngeal   Result Value Ref Range    SARS-CoV-2 Virus Specimen Source Nasopharyngeal     SARS-CoV-2 PCR Result NEGATIVE     SARS-CoV-2 PCR Comment (Note)        Medications - No data to display    Assessments & Plan (with Medical Decision Making)     MDM: Lan Manuel is a 60 year old male who presents with history of heavy alcohol use and tobacco abuse no prior abdominal surgeries  with right upper quadrant abdominal pain constant for the last 2 days.  Pain is currently tolerable and low level but worse with coughing or with deep breathing.  However no shortness of breath or chest pain.  He is exquisitely tender in the right upper quadrant.  He has no associated fever.  He has some loose stools that may be pale.  I am suspicious of acute cholecystitis cholelithiasis, biliary colic.  We will plan for right upper quadrant ultrasound.  As well as laboratory testing with lipase CBC comprehensive panel.  Also to obtain chest x-ray EKG and likely preop Covid testing.  If his ultrasound is nondiagnostic I would pursue CT.  I did also check a lactic acid for ischemic bowel in this 60-year-old male with tobacco abuse.  Doubt referred pain from chest.  Written also for as needed Dilaudid at the time of ultrasound.    Patient on return from ultrasound demonstrating nondiagnostic ultrasound on reexam most of his pain now is right lower lateral abdomen and he goes to CT.  This shows atypical findings of a appendiceal diverticuli and a cecal diverticulitis.  The appendix itself appears to be normal.  Discussed with radiology as well as Dr. Saavedra.  Patient has had moderate pain.  He required no current pain at rest but noted been significant out of the hospital.  We discussed observation given these findings.  Initially given Zosyn after discussing with Dr. Saavedra.  Would continue as Unasyn every 6 hours.  I have also ordered CIWA protocol and he did receive rally pack in the emergency department which includes thiamine.  We will plan for CIWA protocol on the hough.  He shows no signs of alcohol withdrawal at this time.  Discussed with Dr. Hammond who accepts for observation.  The atypical findings at the cecum will require colonoscopy in about 6 weeks.         ED to Inpatient Handoff:    Discussed with Dr. Rojo  Patient accepted for Observation Stay  Pending studies include none  Code Status: Not  Addressed                     I have reviewed the nursing notes.    I have reviewed the findings, diagnosis, plan and need for follow up with the patient.       New Prescriptions    No medications on file       Final diagnoses:   Alcohol abuse   Diverticulitis of colon   Appendicular diverticulum - need follow-up colonoscopy at 6 weeks       1/14/2021   [facility] EMERGENCY DEPT     Frederick Jeffery MD  01/14/21 2187

## 2021-01-14 NOTE — H&P
[facility]     History and Physical - Hospitalist Service       Date of Admission:  1/14/2021    Assessment & Plan   Lan Manuel is a 60 year old male admitted on 1/14/2021. He has history of hypertension and at risk alcohol use. He presents with 2 days of abdominal pain.    Diverticulitis without complication  2 days of worsening right sided abdominal pain, now with loose stools. WBC 12. Vitals stable, afebrile. CT shows diverticulosis with stranding near cecum without signs of perforation or abscess. Appears to be first episode of diverticulitis. No history of colonoscopy in past. Started on IV Zosyn in ED, surgery evaluated in ED and recommended observation with clear diet and IV Unasyn.  - Antibiotics: IV Unasyn  - IVF: LR at 100 ml/hr  - Clear Liquid Diet  - Pain: acetaminophen, oxycodone prn and IV hydromorphone for breakthrough pain  - Nausea protocol  - surgery consulted, evaluated in ED  - recommend outpatient colonoscopy    Hypertension  Chronic. Blood pressures reviewed, elevated in the ED.   - continue home lisinopril    Alcohol Dependence   Reports drinking 6-8 drink/day.  No history of withdrawal in the past though at risk with current daily amount. No signs of alcohol withdrawal on admission.   - CIWA protocol in place with PRN diazepam  - daily thiamine, folic acid, multivitamin therapy initiated  - monitor CBC, electrolytes, CMP    Tobacco Use Disorder  Reports smoking 1 pack per day. Patch available, declined.     Diet: Clear Liquid Diet Thin Liquids (water, ice chips, juice, milk, gelatin, ice cream, etc)    DVT Prophylaxis: Low Risk/Ambulatory with no VTE prophylaxis indicated  Hernandez Catheter: not present  Code Status:   Full Code         Disposition Plan   Expected discharge: Tomorrow, recommended to prior living arrangement once adequate pain management/ tolerating PO medications, antibiotic plan established and no barriers identified .  Entered: Marce ESCAMILLA  "OSMAR Madrigal 01/14/2021, 1:05 PM     The patient's care was discussed with the Attending Physician, Dr. Clyde Lundberg and Patient.    Marce Madrigal PA-C  [facility]   Contact information available via [facility] Paging/Directory  ______________________________________________________________________    Chief Complaint   Abdominal pain    History is obtained from the patient    History of Present Illness   Lan Manuel is a 60 year old male who presents with abdominal pain.    Two days ago he was bearing down to have a bowel movement when he developed a sharp pain on his right side. The pain remained constant though at lower levels and he had intermittent episodes of worsening of the pain with BM, deep breath, certain movements or if he pushed on the area. He has not nausea or emesis. His appetite remained good.     This morning the pain intensified and became a constant intense pain still on the right side. He had an episodes of feeling flushed/sweaty but no clear fevers or chills. His stool was loose and somewhat \"watery and chunky\" in appearance.     He has not had an episode like this before. No family history of IBD.     He denies lightheadedness, dizziness, cough, rhinorrhea, sore throat, shortness of breath, palpitations, chest pain, changes in urination, rash or wounds.    Review of Systems    The 10 point Review of Systems is negative other than noted in the HPI or here.     Past Medical History    I have reviewed this patient's medical history and updated it with pertinent information if needed.   Past Medical History:   Diagnosis Date     Hypertension 8/9/2015       Past Surgical History   I have reviewed this patient's surgical history and updated it with pertinent information if needed.  History reviewed. No pertinent surgical history.    Social History   I have reviewed this patient's social history and updated it with pertinent information if needed. He works in " . Lives in [place] with wife.  Social History     Tobacco Use     Smoking status: Current Every Day Smoker     Types: Cigarettes     Smokeless tobacco: Never Used   Substance Use Topics     Alcohol use: Yes     Comment: 6-8     Drug use: Not Currently     Family History   I have reviewed this patient's family history and updated it with pertinent information if needed.  Family History   Problem Relation Age of Onset     Myocardial Infarction Mother 37         of MI at 37     No Known Problems Father      Prior to Admission Medications   Prior to Admission Medications   Prescriptions Last Dose Informant Patient Reported? Taking?   lisinopril (ZESTRIL) 10 MG tablet   Yes No   Sig: Take 10 mg by mouth daily      Facility-Administered Medications: None     Allergies   No Known Allergies    Physical Exam   Vital Signs: Temp: 98.2  F (36.8  C) Temp src: Temporal BP: (!) 130/94 Pulse: 72   Resp: 20 SpO2: 94 %      Weight: 180 lbs 0 oz    Constitutional: sitting up comfortably in bed, awake, alert, cooperative, no apparent distress, and appears stated age  ENT: Normocephalic, without obvious abnormality, atraumatic, sinuses nontender on palpation, oral pharynx with moist mucous membranes.  Respiratory: No increased work of breathing, good air exchange, clear to auscultation bilaterally, no crackles or wheezing   Cardiovascular: regular rate and rhythm, and no murmur noted. No lower extremity edema. Radial pulses are 2+ bilaterally.  GI: hyperactive bowel sounds, soft, non-distended, tenderness to palpation in the RUQ and RLQ without voluntary guarding. No rebound.  Genitounirinary: deferred  Skin: normal skin color, texture, turgor  Musculoskeletal: Normal bulk and tone. Moves all 4 extremities appropriately.  Neurologic: Awake, alert, oriented to name, place and time.    Neuropsychiatric: calm, pleasant, cooperative, appropriate thought process/content, short term memory intact.     Data   Data  reviewed today: I reviewed all medications, new labs and imaging results over the last 24 hours. I personally reviewed no images or EKG's today.    Recent Labs   Lab 01/14/21  0841   WBC 12.0*   HGB 14.9   MCV 98         POTASSIUM 4.3   CHLORIDE 108   CO2 25   BUN 17   CR 1.12   ANIONGAP 6   EROS 9.5   *   ALBUMIN 4.2   PROTTOTAL 7.7   BILITOTAL 1.2   ALKPHOS 95   ALT 24   AST 14   LIPASE 204     Recent Results (from the past 24 hour(s))   Abdomen US, limited (RUQ only)    Narrative    ULTRASOUND ABDOMEN LIMITED 1/14/2021 9:46 AM    CLINICAL HISTORY: Right upper quadrant abdominal pain, as above.    TECHNIQUE: Limited abdominal ultrasound.    COMPARISON: None.    FINDINGS:    GALLBLADDER: Echogenic focus along the gallbladder wall likely  reflects a gallbladder polyp measuring 0.5 cm. No associated posterior  acoustic shadowing. Additional smaller lesion noted near the  gallbladder neck. No gallstones or gallbladder wall thickening.    BILE DUCTS: There is no biliary dilatation. The common duct measures 3  mm.    LIVER: The liver is increased in echogenicity without focal mass.     RIGHT KIDNEY: Unremarkable.    PANCREAS: The visualized portions of the pancreas are normal.    No ascites.      Impression    IMPRESSION:  1.  Probable small gallbladder polyps. No gallstones or bile duct  dilatation.  2.  Fatty infiltration of the liver.    TRAVON MEDINA MD   Chest XR,  PA & LAT    Narrative    CHEST TWO VIEWS    1/14/2021 9:51 AM     HISTORY: Right upper quadrant abdominal pain, evaluate for referred  pain from lower chest.       COMPARISON: None available      Impression    IMPRESSION: PA and lateral views of the chest were obtained. Cardiac  mediastinal silhouette is within normal limits. No suspicious focal  pulmonary opacities. No significant pleural effusion or pneumothorax.    MACK RHODES MD   CT Abdomen Pelvis w Contrast    Narrative    CT ABDOMEN AND PELVIS WITH CONTRAST   1/14/2021  10:51 AM     HISTORY: Right abdominal pain. Non-diagnostic right upper quadrant  ultrasound. Evaluate for small bowel obstruction, appendicitis.    TECHNIQUE:  CT abdomen and pelvis with 89 mL Isovue-370 IV. Radiation  dose for this scan was reduced using automated exposure control,  adjustment of the mA and/or kV according to patient size, or iterative  reconstruction technique.    COMPARISON: Abdominal ultrasound on same day earlier.    FINDINGS:  Lower chest: Mild bibasilar pulmonary opacities, likely atelectasis. A  few old left rib fractures are noted. Small hiatal hernia.    Abdomen/pelvis: No suspicious focal hepatic lesion. No radiodense  gallstones. No main pancreatic ductal dilatation or definite solid  pancreatic mass. No splenomegaly. No adrenal nodules. Few tiny left  kidney stones in the lower pole. No right kidney stone. No ureteric  stone or hydronephrosis in either kidney.    No abnormally dilated bowel loops. The appendix is visualized and  appears nondilated; however, with multiple diverticula arising from  the appendix. There is moderate size diverticulum arising from the  cecum with peridiverticular fat stranding, findings likely represent  acute diverticulitis. No evidence of colonic perforation or  pericolonic loculated collection. Moderate atherosclerotic vascular  calcification of the abdominal aorta and iliac vessels.    Bones and soft tissues: Multilevel degenerative changes of the spine.  No suspicious osseous lesion.      Impression    IMPRESSION: Colonic diverticulosis including diverticulosis of the  cecum with peridiverticular fat stranding along the cecal  diverticulum. Findings worrisome for acute diverticulitis. No evidence  of colonic perforation or pericolonic loculated collection. Adjacent  to the inflamed diverticulum, the appendix is visualized and appears  not dilated; however, but contains multiple appendiceal diverticula.    Findings were discussed with the ordering  provider, JIGNESH SULLIVAN, at  11:26 AM on 1/14/2021.    MACK RHODES MD

## 2021-01-14 NOTE — ED NOTES
Pt here with RUQ pain that started on Tuesday morning, had liquid stools yesterday, denies N/V. Pain worse with movement or when having a bowel movement. Also has pain with urination but states this is not new. No hx of abdominal surgeries.

## 2021-01-15 VITALS
DIASTOLIC BLOOD PRESSURE: 91 MMHG | WEIGHT: 183.86 LBS | BODY MASS INDEX: 27.87 KG/M2 | OXYGEN SATURATION: 96 % | HEART RATE: 68 BPM | SYSTOLIC BLOOD PRESSURE: 134 MMHG | HEIGHT: 68 IN | RESPIRATION RATE: 18 BRPM | TEMPERATURE: 98.2 F

## 2021-01-15 LAB
ALBUMIN SERPL-MCNC: 3.4 G/DL (ref 3.4–5)
ALP SERPL-CCNC: 83 U/L (ref 40–150)
ALT SERPL W P-5'-P-CCNC: 18 U/L (ref 0–70)
ANION GAP SERPL CALCULATED.3IONS-SCNC: 2 MMOL/L (ref 3–14)
AST SERPL W P-5'-P-CCNC: 12 U/L (ref 0–45)
BASOPHILS # BLD AUTO: 0 10E9/L (ref 0–0.2)
BASOPHILS NFR BLD AUTO: 0.4 %
BILIRUB SERPL-MCNC: 1.5 MG/DL (ref 0.2–1.3)
BUN SERPL-MCNC: 9 MG/DL (ref 7–30)
CALCIUM SERPL-MCNC: 8.8 MG/DL (ref 8.5–10.1)
CHLORIDE SERPL-SCNC: 107 MMOL/L (ref 94–109)
CO2 SERPL-SCNC: 30 MMOL/L (ref 20–32)
CREAT SERPL-MCNC: 0.82 MG/DL (ref 0.66–1.25)
DIFFERENTIAL METHOD BLD: ABNORMAL
EOSINOPHIL # BLD AUTO: 0.2 10E9/L (ref 0–0.7)
EOSINOPHIL NFR BLD AUTO: 2.1 %
ERYTHROCYTE [DISTWIDTH] IN BLOOD BY AUTOMATED COUNT: 11.7 % (ref 10–15)
GFR SERPL CREATININE-BSD FRML MDRD: >90 ML/MIN/{1.73_M2}
GLUCOSE SERPL-MCNC: 98 MG/DL (ref 70–99)
HCT VFR BLD AUTO: 38.4 % (ref 40–53)
HGB BLD-MCNC: 13.1 G/DL (ref 13.3–17.7)
IMM GRANULOCYTES # BLD: 0 10E9/L (ref 0–0.4)
IMM GRANULOCYTES NFR BLD: 0.3 %
LYMPHOCYTES # BLD AUTO: 2 10E9/L (ref 0.8–5.3)
LYMPHOCYTES NFR BLD AUTO: 25.1 %
MCH RBC QN AUTO: 33.8 PG (ref 26.5–33)
MCHC RBC AUTO-ENTMCNC: 34.1 G/DL (ref 31.5–36.5)
MCV RBC AUTO: 99 FL (ref 78–100)
MONOCYTES # BLD AUTO: 0.7 10E9/L (ref 0–1.3)
MONOCYTES NFR BLD AUTO: 8.8 %
NEUTROPHILS # BLD AUTO: 5 10E9/L (ref 1.6–8.3)
NEUTROPHILS NFR BLD AUTO: 63.3 %
NRBC # BLD AUTO: 0 10*3/UL
NRBC BLD AUTO-RTO: 0 /100
PLATELET # BLD AUTO: 176 10E9/L (ref 150–450)
POTASSIUM SERPL-SCNC: 3.9 MMOL/L (ref 3.4–5.3)
PROT SERPL-MCNC: 6.6 G/DL (ref 6.8–8.8)
RBC # BLD AUTO: 3.88 10E12/L (ref 4.4–5.9)
SODIUM SERPL-SCNC: 139 MMOL/L (ref 133–144)
WBC # BLD AUTO: 7.9 10E9/L (ref 4–11)

## 2021-01-15 PROCEDURE — 96376 TX/PRO/DX INJ SAME DRUG ADON: CPT

## 2021-01-15 PROCEDURE — 250N000013 HC RX MED GY IP 250 OP 250 PS 637: Performed by: PHYSICIAN ASSISTANT

## 2021-01-15 PROCEDURE — 250N000011 HC RX IP 250 OP 636: Performed by: FAMILY MEDICINE

## 2021-01-15 PROCEDURE — 36415 COLL VENOUS BLD VENIPUNCTURE: CPT | Performed by: FAMILY MEDICINE

## 2021-01-15 PROCEDURE — 80053 COMPREHEN METABOLIC PANEL: CPT | Performed by: FAMILY MEDICINE

## 2021-01-15 PROCEDURE — 99207 PR CDG-CODE CATEGORY CHANGED: CPT | Performed by: FAMILY MEDICINE

## 2021-01-15 PROCEDURE — 85025 COMPLETE CBC W/AUTO DIFF WBC: CPT | Performed by: FAMILY MEDICINE

## 2021-01-15 PROCEDURE — 99217 PR OBSERVATION CARE DISCHARGE: CPT | Performed by: FAMILY MEDICINE

## 2021-01-15 PROCEDURE — 258N000003 HC RX IP 258 OP 636: Performed by: PHYSICIAN ASSISTANT

## 2021-01-15 PROCEDURE — G0378 HOSPITAL OBSERVATION PER HR: HCPCS

## 2021-01-15 RX ADMIN — AMPICILLIN SODIUM AND SULBACTAM SODIUM 1.5 G: 1; .5 INJECTION, POWDER, FOR SOLUTION INTRAMUSCULAR; INTRAVENOUS at 02:43

## 2021-01-15 RX ADMIN — AMPICILLIN SODIUM AND SULBACTAM SODIUM 1.5 G: 1; .5 INJECTION, POWDER, FOR SOLUTION INTRAMUSCULAR; INTRAVENOUS at 09:30

## 2021-01-15 RX ADMIN — THIAMINE HCL TAB 100 MG 200 MG: 100 TAB at 07:47

## 2021-01-15 RX ADMIN — LISINOPRIL 10 MG: 10 TABLET ORAL at 07:47

## 2021-01-15 RX ADMIN — SODIUM CHLORIDE, POTASSIUM CHLORIDE, SODIUM LACTATE AND CALCIUM CHLORIDE: 600; 310; 30; 20 INJECTION, SOLUTION INTRAVENOUS at 02:43

## 2021-01-15 RX ADMIN — FOLIC ACID 1 MG: 1 TABLET ORAL at 07:47

## 2021-01-15 RX ADMIN — MULTIPLE VITAMINS W/ MINERALS TAB 1 TABLET: TAB at 07:47

## 2021-01-15 NOTE — PLAN OF CARE
[facility] [facility] DISCHARGE NOTE    Patient discharged to home at 12:32 PM via ambulation. Accompanied by staff. Discharge instructions reviewed with patient, opportunity offered to ask questions. Prescriptions sent to patients preferred pharmacy. All belongings sent with patient.    Eugenie Berger RN

## 2021-01-15 NOTE — DISCHARGE SUMMARY
[facility] Discharge Summary    Lan Manuel MRN# [id number]   Age: 60 year old YOB: 1960     Date of Admission:  1/14/2021  Date of Discharge::  1/15/2021  Admitting Physician:  Christopher Rojo MD  Discharge Physician:  Clyde Lundberg MD, MD             Admission Diagnoses:   Alcohol abuse [F10.10]  Diverticulitis of colon [K57.32]  Appendicular diverticulum [K38.2]          Principle Discharge Diagnosis:       Acute Diverticulitis without complication    See hospital course for further active diagnoses addressed during this admission.            Procedures:   No procedures performed during this admission          Medications Prior to Admission:     Medications Prior to Admission   Medication Sig Dispense Refill Last Dose     lisinopril (ZESTRIL) 10 MG tablet Take 10 mg by mouth daily   1/13/2021 at 1200             Discharge Medications:     Current Discharge Medication List      START taking these medications    Details   amoxicillin-clavulanate (AUGMENTIN) 875-125 MG tablet Take 1 tablet by mouth 2 times daily for 10 days  Qty: 20 tablet, Refills: 0    Associated Diagnoses: Diverticulitis         CONTINUE these medications which have NOT CHANGED    Details   lisinopril (ZESTRIL) 10 MG tablet Take 10 mg by mouth daily                   Consultations:   Consultation during this admission received from surgery          Brief History of Illness:     From Admission H+P:   Lan Manuel is a 60 year old male who presents with abdominal pain.     Two days ago he was bearing down to have a bowel movement when he developed a sharp pain on his right side. The pain remained constant though at lower levels and he had intermittent episodes of worsening of the pain with BM, deep breath, certain movements or if he pushed on the area. He has not nausea or emesis. His appetite remained good.      This morning the pain intensified and became a constant intense pain still on the right side.  "He had an episodes of feeling flushed/sweaty but no clear fevers or chills. His stool was loose and somewhat \"watery and chunky\" in appearance.      He has not had an episode like this before. No family history of IBD.      He denies lightheadedness, dizziness, cough, rhinorrhea, sore throat, shortness of breath, palpitations, chest pain, changes in urination, rash or wounds.               TODAY:     Subjective:  Much improved today and doin tracy. Just some mild soreness in the left mid abdomen now, no fever or chills.  Hasn't needed any pain medications since yesterday.  Tolerating regular diet.  No nausea or vomiting.  No dyspnea.  Feels \"great\" and ready for discharge home today.   No other pain.       ROS:   ROS: 10 point ROS neg other than the symptoms noted above in the HPI.   BP (!) 134/91   Pulse 68   Temp 98.2  F (36.8  C) (Oral)   Resp 18   Ht 1.727 m (5' 8\")   Wt 83.4 kg (183 lb 13.8 oz)   SpO2 96%   BMI 27.96 kg/m     EXAM:  General: awake and alert, NAD, oriented x 3  Head: normocephalic  Neck: unremarkable, no lymphadenopathy   HEENT: oropharynx pink and moist    Heart: Regular rate and rhythm, no murmurs, rubs, or gallops  Lungs: clear to auscultation bilaterally with good air movement throughout  Abdomen: soft, very mild tenderness to palpation left lateral mid abdomen, otherwise  non-tender, no masses or organomegaly, bowel sounds present, non-distended  Extremities: no edema in lower extremities   Skin unremarkable.            Hospital Course:     Lan Manuel is a 60 year old male admitted on 1/14/2021. He has history of hypertension and at risk alcohol use. He presents with 2 days of abdominal pain.     Acute Diverticulitis without complication  1/14/21 -- 2 days of worsening right sided abdominal pain, now with loose stools. WBC 12. Vitals stable, afebrile. CT shows diverticulosis with stranding near cecum without signs of perforation or abscess. Appears to be first episode of " diverticulitis. No history of colonoscopy in past. Started on IV Zosyn in ED, surgery evaluated in ED and recommended observation with clear diet and IV Unasyn.  - Antibiotics: IV Unasyn  - IVF: LR at 100 ml/hr  - Clear Liquid Diet  - Pain: acetaminophen, oxycodone prn and IV hydromorphone for breakthrough pain  1/15/2021 -- improving, tolerating regular diet.  Agree with surgery that he looks ok for discharge home today with 10 days of augmentin and plan to follow-up with Dr. Saavedra in 10-14 days.  Will need an outpatient colonoscopy, Dr. Saavedra to coordinate this at follow-up appointment.       Hypertension  Blood pressure just mildly elevated day of discharge, continue prior to admission lisinopril     Alcohol Dependence   1/14/21 -- Reports drinking 6-8 drink/day.  No history of withdrawal in the past though at risk with current daily amount. No signs of alcohol withdrawal on admission.   - CIWA protocol in place with PRN diazepam  - daily thiamine, folic acid, multivitamin therapy initiated  1/15/2021 -- no withdrawal here.  Advised to cut back to goal of not more than 14/wk.        Tobacco Use Disorder  Reports smoking 1 pack per day. Patch available, declined.       Code Status:   Full Code               Discharge Instructions and Follow-Up:     Discharge diet: Orders Placed This Encounter      Regular Diet Adult       Discharge activity: Activity as tolerated   Discharge follow-up: Follow-up with Dr. Saavedra in 10-14 days, he'll discuss scheduling an outpatient colonoscopy at that time.              Discharge Disposition:     Discharged to home      Attestation:  I have reviewed today's vital signs, notes, medications, labs and imaging.  Amount of time performed on this discharge summary: 40 minutes.    Clyde Lundberg MD, MD

## 2021-01-15 NOTE — PLAN OF CARE
"Patient alert and orientated. Up IND in room. Continues on IV ABX per MAR. ABD is distended and has pain to his right side. On clear liquid diet and tolerating well. Patient has declined pain medications. Crackles present in posterior lower lobes upon initial assessment. Encouraged patient to deep breathing.     /76 (BP Location: Left arm)   Pulse 67   Temp 98.3  F (36.8  C) (Oral)   Resp 18   Ht 1.727 m (5' 8\")   Wt 83.4 kg (183 lb 13.8 oz)   SpO2 97%   BMI 27.96 kg/m      "

## 2021-01-15 NOTE — PLAN OF CARE
"Patient alert/oriented.  Independent. Saline locked. CIWA: 0. Patient tolerated regular diet for breakfast. Denies n/v.  States it only hurts in his right abdomen at times with movement and with palpation.  Declines pain medications. Still having watery stools.  Unasyn infused this AM.   Patient eager for discharge today.  BP (!) 134/91   Pulse 68   Temp 98.2  F (36.8  C) (Oral)   Resp 18   Ht 1.727 m (5' 8\")   Wt 83.4 kg (183 lb 13.8 oz)   SpO2 96%   BMI 27.96 kg/m    Eugenie Berger RN on 1/15/2021 at 10:13 AM    "

## 2021-01-15 NOTE — PROGRESS NOTES
Patient has appeared to be resting comfortably overnight.  When asked about pain he reports mild pain and declines needs for additional pain medication.CIWA remains 0.

## 2021-01-15 NOTE — PROGRESS NOTES
Subjective:     Seen and examined.  No acute events overnight.  Denies nausea, vomiting, fevers, chills.  Has appetite.  Passing flatus.  Tolerating clear liquids.    I/O last 3 completed shifts:  In: 1683.33 [P.O.:480; I.V.:1203.33]  Out: -      No current outpatient medications on file.         ROUTINE IP LABS (Last four results)  BMP  Recent Labs   Lab 01/15/21  0410 01/14/21  0841    139   POTASSIUM 3.9 4.3   CHLORIDE 107 108   EROS 8.8 9.5   CO2 30 25   BUN 9 17   CR 0.82 1.12   GLC 98 111*     CBC  Recent Labs   Lab 01/15/21  0410 01/14/21  0841   WBC 7.9 12.0*   RBC 3.88* 4.37*   HGB 13.1* 14.9   HCT 38.4* 42.8   MCV 99 98   MCH 33.8* 34.1*   MCHC 34.1 34.8   RDW 11.7 11.7    210     INRNo lab results found in last 7 days.         Tmax: 99.3  --->  Tcurrent: 98.1   B/P: 126/78  P: 67  R: 18  SpO2:96% RA        EXAM  AOX4 NAD  CTAB  RRR  S&ND +BS; minimal RLQ TTP without R/R/G  No CCE        A/P:   1. Cecal diverticulitis  - Regular diet  - Saline lock  - Can discharge if tolerates on 10 days of Augmentin  - Follow-up as outpatient in 10-14 days in my office    2. Appendiceal diverticulosis    Will need outpatient colonoscopy.  Discussed this in detail with patient.

## 2021-01-26 ENCOUNTER — OFFICE VISIT (OUTPATIENT)
Dept: SURGERY | Facility: CLINIC | Age: 61
End: 2021-01-26
Payer: COMMERCIAL

## 2021-01-26 VITALS
BODY MASS INDEX: 27.87 KG/M2 | DIASTOLIC BLOOD PRESSURE: 89 MMHG | HEART RATE: 84 BPM | WEIGHT: 183.86 LBS | SYSTOLIC BLOOD PRESSURE: 124 MMHG | TEMPERATURE: 97.6 F | HEIGHT: 68 IN

## 2021-01-26 DIAGNOSIS — K57.32 DIVERTICULITIS OF COLON: Primary | ICD-10-CM

## 2021-01-26 PROCEDURE — 99212 OFFICE O/P EST SF 10 MIN: CPT | Performed by: SURGERY

## 2021-01-26 ASSESSMENT — MIFFLIN-ST. JEOR: SCORE: 1628.5

## 2021-01-26 NOTE — NURSING NOTE
"Initial /89 (BP Location: Right arm, Patient Position: Sitting, Cuff Size: Adult Large)   Pulse 84   Temp 97.6  F (36.4  C) (Tympanic)   Ht 1.727 m (5' 8\")   Wt 83.4 kg (183 lb 13.8 oz)   BMI 27.96 kg/m   Estimated body mass index is 27.96 kg/m  as calculated from the following:    Height as of this encounter: 1.727 m (5' 8\").    Weight as of this encounter: 83.4 kg (183 lb 13.8 oz). .    Radha Salazar MA    "

## 2021-01-26 NOTE — PROGRESS NOTES
"General Surgery Follow Up    Pt returns for follow up visit s/p hospitalization for cecal diverticulitis    Patient has been doing well, tolerating diet. Bowels moving well. Pain resolved.  No fevers.  He feels back to baseline      Physical exam: Vitals: /89 (BP Location: Right arm, Patient Position: Sitting, Cuff Size: Adult Large)   Pulse 84   Temp 97.6  F (36.4  C) (Tympanic)   Ht 1.727 m (5' 8\")   Wt 83.4 kg (183 lb 13.8 oz)   BMI 27.96 kg/m    BMI= Body mass index is 27.96 kg/m .    Exam:  Constitutional: healthy, alert and no distress  Cardiovascular: negative  Respiratory: negative  Gastrointestinal: Abdomen soft, non-tender. BS normal. No masses, organomegaly    Assessment:     ICD-10-CM    1. Diverticulitis of colon  K57.32 GASTROENTEROLOGY ADULT REF PROCEDURE ONLY     Plan: Mr. Manuel presents after medical management of cecal diverticulitis.  He is doing well.  He admits no issues with fevers, chills, continued pain, and bowel movements have normalized.  He has not had colonoscopy in the past.  I discussed this would be indicated both for screening and ruling out occult malignancy as cause for his cecal diverticulitis.  He is in agreement and will be set up in 6-8 weeks.      Beau Saavedra,  on 1/26/2021 at 8:53 AM      "

## 2021-01-26 NOTE — LETTER
"    1/26/2021         RE: Lan Manuel  [address] [place] [address]        Dear Colleague,    Thank you for referring your patient, Lan Manuel, to the [facility]. Please see a copy of my visit note below.    General Surgery Follow Up    Pt returns for follow up visit s/p hospitalization for cecal diverticulitis    Patient has been doing well, tolerating diet. Bowels moving well. Pain resolved.  No fevers.  He feels back to baseline      Physical exam: Vitals: /89 (BP Location: Right arm, Patient Position: Sitting, Cuff Size: Adult Large)   Pulse 84   Temp 97.6  F (36.4  C) (Tympanic)   Ht 1.727 m (5' 8\")   Wt 83.4 kg (183 lb 13.8 oz)   BMI 27.96 kg/m    BMI= Body mass index is 27.96 kg/m .    Exam:  Constitutional: healthy, alert and no distress  Cardiovascular: negative  Respiratory: negative  Gastrointestinal: Abdomen soft, non-tender. BS normal. No masses, organomegaly    Assessment:     ICD-10-CM    1. Diverticulitis of colon  K57.32 GASTROENTEROLOGY ADULT REF PROCEDURE ONLY     Plan: Mr. Manuel presents after medical management of cecal diverticulitis.  He is doing well.  He admits no issues with fevers, chills, continued pain, and bowel movements have normalized.  He has not had colonoscopy in the past.  I discussed this would be indicated both for screening and ruling out occult malignancy as cause for his cecal diverticulitis.  He is in agreement and will be set up in 6-8 weeks.      Beau Saavedra,  on 1/26/2021 at 8:53 AM          Again, thank you for allowing me to participate in the care of your patient.        Sincerely,        Beau Saavedra, DO    "

## 2021-02-02 ENCOUNTER — TELEPHONE (OUTPATIENT)
Dept: SURGERY | Facility: CLINIC | Age: 61
End: 2021-02-02

## 2021-02-02 NOTE — TELEPHONE ENCOUNTER
Procedure  has reached out to schedule follow up colonoscopy      Patient has failed to return calls to schedule      No further action necessary for procedure  at this time

## 2021-02-02 NOTE — LETTER
[facility]  [address] [address]  Phone: [phone]                                                                                               Date: 2/15/2021    [place] [address]        Dear Dr. Quentin Montenegro has requested to have you return for a Colonoscopy procedure at this time.  He advised that this would be indicated both for screening and ruling out occult malignancy as cause for your cecal diverticulitis.     You may call [facility] - Surgery Scheduling at [phone].  to schedule this procedure.      Please disregard this notice if you already have made an appointment.        Sincerely,    Beau Saavedra DO/ Luis Miguel RN

## 2021-02-09 NOTE — TELEPHONE ENCOUNTER
Can you please call Mr. Manuel and advise him we recommended colonsocopy?  Order was placed, Parag attempted to contact and patient never answered.  If no answer, please send letter.     THank you     Theo     Left message on answering machine for patient to call back.

## 2021-02-15 NOTE — TELEPHONE ENCOUNTER
Called pt left message to return call.  Letter sent.    Araceli Gardner  [facility] RN

## 2021-02-17 DIAGNOSIS — Z11.59 ENCOUNTER FOR SCREENING FOR OTHER VIRAL DISEASES: ICD-10-CM

## 2021-02-23 RX ORDER — PHENOL 1.4 %
600 AEROSOL, SPRAY (ML) MUCOUS MEMBRANE DAILY
COMMUNITY
End: 2023-10-19

## 2021-02-23 RX ORDER — AMOXICILLIN 500 MG
1200 CAPSULE ORAL DAILY
COMMUNITY
End: 2023-10-19

## 2021-02-26 DIAGNOSIS — Z11.59 ENCOUNTER FOR SCREENING FOR OTHER VIRAL DISEASES: ICD-10-CM

## 2021-02-26 PROCEDURE — U0003 INFECTIOUS AGENT DETECTION BY NUCLEIC ACID (DNA OR RNA); SEVERE ACUTE RESPIRATORY SYNDROME CORONAVIRUS 2 (SARS-COV-2) (CORONAVIRUS DISEASE [COVID-19]), AMPLIFIED PROBE TECHNIQUE, MAKING USE OF HIGH THROUGHPUT TECHNOLOGIES AS DESCRIBED BY CMS-2020-01-R: HCPCS | Performed by: SURGERY

## 2021-02-26 PROCEDURE — U0005 INFEC AGEN DETEC AMPLI PROBE: HCPCS | Performed by: SURGERY

## 2021-02-27 LAB
LABORATORY COMMENT REPORT: NORMAL
SARS-COV-2 RNA RESP QL NAA+PROBE: NEGATIVE
SARS-COV-2 RNA RESP QL NAA+PROBE: NORMAL
SPECIMEN SOURCE: NORMAL
SPECIMEN SOURCE: NORMAL

## 2021-03-01 ENCOUNTER — ANESTHESIA EVENT (OUTPATIENT)
Dept: GASTROENTEROLOGY | Facility: CLINIC | Age: 61
End: 2021-03-01
Payer: COMMERCIAL

## 2021-03-01 ASSESSMENT — LIFESTYLE VARIABLES: TOBACCO_USE: 1

## 2021-03-01 NOTE — ANESTHESIA PREPROCEDURE EVALUATION
Anesthesia Pre-Procedure Evaluation    Patient: Lan Manuel   MRN: [id number] : 1960        Preoperative Diagnosis: Diverticulitis of colon [K57.32]   Procedure : Procedure(s):  COLONOSCOPY     Past Medical History:   Diagnosis Date     Hypertension 2015      History reviewed. No pertinent surgical history.   No Known Allergies   Social History     Tobacco Use     Smoking status: Current Every Day Smoker     Types: Cigarettes     Smokeless tobacco: Never Used   Substance Use Topics     Alcohol use: Yes     Comment: 6-8      Wt Readings from Last 1 Encounters:   21 83.4 kg (183 lb 13.8 oz)        Anesthesia Evaluation   Pt has had prior anesthetic.         ROS/MED HX  ENT/Pulmonary:     (+) tobacco use, Current use, 1 packs/day,     Neurologic:  - neg neurologic ROS     Cardiovascular:     (+) Dyslipidemia hypertension-----    METS/Exercise Tolerance:     Hematologic:  - neg hematologic  ROS     Musculoskeletal:  - neg musculoskeletal ROS     GI/Hepatic:  - neg GI/hepatic ROS     Renal/Genitourinary:  - neg Renal ROS     Endo:  - neg endo ROS     Psychiatric/Substance Use:  - neg psychiatric ROS     Infectious Disease:  - neg infectious disease ROS     Malignancy:  - neg malignancy ROS     Other:  - neg other ROS          Physical Exam    Airway        Mallampati: II   TM distance: > 3 FB   Neck ROM: full   Mouth opening: > 3 cm    Respiratory Devices and Support         Dental       (+) chipped      Cardiovascular   cardiovascular exam normal          Pulmonary   pulmonary exam normal                OUTSIDE LABS:  CBC:   Lab Results   Component Value Date    WBC 7.9 01/15/2021    WBC 12.0 (H) 2021    HGB 13.1 (L) 01/15/2021    HGB 14.9 2021    HCT 38.4 (L) 01/15/2021    HCT 42.8 2021     01/15/2021     2021     BMP:   Lab Results   Component Value Date     01/15/2021     2021    POTASSIUM 3.9 01/15/2021    POTASSIUM 4.3 2021     CHLORIDE 107 01/15/2021    CHLORIDE 108 01/14/2021    CO2 30 01/15/2021    CO2 25 01/14/2021    BUN 9 01/15/2021    BUN 17 01/14/2021    CR 0.82 01/15/2021    CR 1.12 01/14/2021    GLC 98 01/15/2021     (H) 01/14/2021     COAGS: No results found for: PTT, INR, FIBR  POC: No results found for: BGM, HCG, HCGS  HEPATIC:   Lab Results   Component Value Date    ALBUMIN 3.4 01/15/2021    PROTTOTAL 6.6 (L) 01/15/2021    ALT 18 01/15/2021    AST 12 01/15/2021    ALKPHOS 83 01/15/2021    BILITOTAL 1.5 (H) 01/15/2021     OTHER:   Lab Results   Component Value Date    LACT 0.9 01/14/2021    EROS 8.8 01/15/2021    LIPASE 204 01/14/2021       Anesthesia Plan    ASA Status:  2   NPO Status:  NPO Appropriate    Anesthesia Type: General.   Induction: Propofol, Intravenous.   Maintenance: TIVA.        Consents    Anesthesia Plan(s) and associated risks, benefits, and realistic alternatives discussed. Questions answered and patient/representative(s) expressed understanding.     - Discussed with:  Patient         Postoperative Care    Pain management: IV analgesics, Oral pain medications.   PONV prophylaxis: Ondansetron (or other 5HT-3), Dexamethasone or Solumedrol     Comments:                VICKIE Correa CRNA

## 2021-03-02 ENCOUNTER — ANESTHESIA (OUTPATIENT)
Dept: GASTROENTEROLOGY | Facility: CLINIC | Age: 61
End: 2021-03-02
Payer: COMMERCIAL

## 2021-03-02 ENCOUNTER — HOSPITAL ENCOUNTER (OUTPATIENT)
Facility: CLINIC | Age: 61
Discharge: HOME | End: 2021-03-02
Attending: SURGERY | Admitting: SURGERY
Payer: COMMERCIAL

## 2021-03-02 VITALS
SYSTOLIC BLOOD PRESSURE: 137 MMHG | DIASTOLIC BLOOD PRESSURE: 101 MMHG | RESPIRATION RATE: 12 BRPM | TEMPERATURE: 98.7 F | HEART RATE: 74 BPM | BODY MASS INDEX: 27.28 KG/M2 | OXYGEN SATURATION: 95 % | WEIGHT: 180 LBS | HEIGHT: 68 IN

## 2021-03-02 LAB — COLONOSCOPY: NORMAL

## 2021-03-02 PROCEDURE — 45380 COLONOSCOPY AND BIOPSY: CPT | Performed by: SURGERY

## 2021-03-02 PROCEDURE — 45381 COLONOSCOPY SUBMUCOUS NJX: CPT | Mod: 51 | Performed by: SURGERY

## 2021-03-02 PROCEDURE — 370N000017 HC ANESTHESIA TECHNICAL FEE, PER MIN: Performed by: SURGERY

## 2021-03-02 PROCEDURE — 250N000011 HC RX IP 250 OP 636: Performed by: NURSE ANESTHETIST, CERTIFIED REGISTERED

## 2021-03-02 PROCEDURE — 258N000003 HC RX IP 258 OP 636: Performed by: SURGERY

## 2021-03-02 PROCEDURE — 88305 TISSUE EXAM BY PATHOLOGIST: CPT | Mod: TC | Performed by: SURGERY

## 2021-03-02 PROCEDURE — 88305 TISSUE EXAM BY PATHOLOGIST: CPT | Mod: 26 | Performed by: PATHOLOGY

## 2021-03-02 PROCEDURE — 45381 COLONOSCOPY SUBMUCOUS NJX: CPT | Performed by: SURGERY

## 2021-03-02 PROCEDURE — 250N000009 HC RX 250: Performed by: SURGERY

## 2021-03-02 PROCEDURE — 250N000009 HC RX 250: Performed by: NURSE ANESTHETIST, CERTIFIED REGISTERED

## 2021-03-02 RX ORDER — LIDOCAINE HYDROCHLORIDE 10 MG/ML
INJECTION, SOLUTION INFILTRATION; PERINEURAL PRN
Status: DISCONTINUED | OUTPATIENT
Start: 2021-03-02 | End: 2021-03-02

## 2021-03-02 RX ORDER — LIDOCAINE 40 MG/G
CREAM TOPICAL
Status: DISCONTINUED | OUTPATIENT
Start: 2021-03-02 | End: 2021-03-02 | Stop reason: HOSPADM

## 2021-03-02 RX ORDER — ONDANSETRON 2 MG/ML
4 INJECTION INTRAMUSCULAR; INTRAVENOUS
Status: DISCONTINUED | OUTPATIENT
Start: 2021-03-02 | End: 2021-03-02 | Stop reason: HOSPADM

## 2021-03-02 RX ORDER — PROPOFOL 10 MG/ML
INJECTION, EMULSION INTRAVENOUS PRN
Status: DISCONTINUED | OUTPATIENT
Start: 2021-03-02 | End: 2021-03-02

## 2021-03-02 RX ORDER — SODIUM CHLORIDE, SODIUM LACTATE, POTASSIUM CHLORIDE, CALCIUM CHLORIDE 600; 310; 30; 20 MG/100ML; MG/100ML; MG/100ML; MG/100ML
INJECTION, SOLUTION INTRAVENOUS CONTINUOUS
Status: DISCONTINUED | OUTPATIENT
Start: 2021-03-02 | End: 2021-03-02 | Stop reason: HOSPADM

## 2021-03-02 RX ORDER — PROPOFOL 10 MG/ML
INJECTION, EMULSION INTRAVENOUS CONTINUOUS PRN
Status: DISCONTINUED | OUTPATIENT
Start: 2021-03-02 | End: 2021-03-02

## 2021-03-02 RX ADMIN — LIDOCAINE HYDROCHLORIDE 50 MG: 10 INJECTION, SOLUTION INFILTRATION; PERINEURAL at 10:35

## 2021-03-02 RX ADMIN — PROPOFOL 100 MG: 10 INJECTION, EMULSION INTRAVENOUS at 10:35

## 2021-03-02 RX ADMIN — LIDOCAINE HYDROCHLORIDE 1 ML: 10 INJECTION, SOLUTION EPIDURAL; INFILTRATION; INTRACAUDAL; PERINEURAL at 10:12

## 2021-03-02 RX ADMIN — SODIUM CHLORIDE, POTASSIUM CHLORIDE, SODIUM LACTATE AND CALCIUM CHLORIDE 30 ML: 600; 310; 30; 20 INJECTION, SOLUTION INTRAVENOUS at 10:11

## 2021-03-02 RX ADMIN — PROPOFOL 30 MG: 10 INJECTION, EMULSION INTRAVENOUS at 11:03

## 2021-03-02 RX ADMIN — PROPOFOL 150 MCG/KG/MIN: 10 INJECTION, EMULSION INTRAVENOUS at 10:35

## 2021-03-02 ASSESSMENT — MIFFLIN-ST. JEOR: SCORE: 1610.97

## 2021-03-02 NOTE — H&P
"60 year old year old male here for colonoscopy for screening.  Additionally, he had an episode of abdominal pain recently and a CT scan showed diverticulitis of the right colon.    Patient Active Problem List   Diagnosis     Hypertension     Multiple rib fractures     Nicotine dependence     Left scapula fracture     Diverticulitis     Diverticulitis of colon     Appendicular diverticulum     Alcohol use       Past Medical History:   Diagnosis Date     Hypertension 8/9/2015       History reviewed. No pertinent surgical history.    @[facility]@    No current outpatient medications on file.       No Known Allergies    Pt reports that he has been smoking cigarettes. He has never used smokeless tobacco. He reports current alcohol use. He reports previous drug use.    Exam:  BP (!) 135/93   Temp 98.7  F (37.1  C) (Oral)   Resp 18   Ht 1.727 m (5' 8\")   Wt 81.6 kg (180 lb)   SpO2 97%   BMI 27.37 kg/m      Awake, Alert OX3  Lungs - CTA bilaterally  CV - RRR, no murmurs, distal pulses intact  Abd - soft, non-distended, non-tender, +BS  Extr - No cyanosis or edema    A/P 60 year old year old male in need of colonoscopy for screening. Risks, benefits, alternatives, and complications were discussed including the possibility of perforation and the patient agreed to proceed    Francis Chow MD   "

## 2021-03-02 NOTE — ANESTHESIA CARE TRANSFER NOTE
Patient: Lan Castellanoslante    Procedure(s):  COLONOSCOPY    Diagnosis: Diverticulitis of colon [K57.32]  Diagnosis Additional Information: No value filed.    Anesthesia Type:   General     Note:    Oropharynx: spontaneously breathing  Level of Consciousness: drowsy  Oxygen Supplementation: nasal cannula  Level of Supplemental Oxygen (L/min / FiO2): 2  Independent Airway: airway patency satisfactory and stable  Dentition: dentition unchanged  Vital Signs Stable: post-procedure vital signs reviewed and stable  Report to RN Given: handoff report given  Patient transferred to: Phase II          Vitals: (Last set prior to Anesthesia Care Transfer)  CRNA VITALS  3/2/2021 1010 - 3/2/2021 1040      3/2/2021             Pulse:  80    Ht Rate:  80    SpO2:  97 %        Electronically Signed By: VICKIE Patricia CRNA  March 2, 2021  10:40 AM

## 2021-03-02 NOTE — BRIEF OP NOTE
[facility]   Brief Operative Note    Pre-operative diagnosis: Diverticulitis of colon [K57.32]   Post-operative diagnosis Two polyps, diverticulosis     Procedure: Procedure(s):  COLONOSCOPY, WITH POLYPECTOMY AND BIOPSY  Tattooing, With Colonoscopy   Surgeon(s): Surgeon(s) and Role:     * Francis Chow MD - Primary   Estimated blood loss: * No values recorded between 3/2/2021 10:42 AM and 3/2/2021 11:10 AM *    Specimens: ID Type Source Tests Collected by Time Destination   A : 70cm Biopsy Large Intestine, Other SURGICAL PATHOLOGY EXAM Francis Chow MD 3/2/2021 10:59 AM    B : 30cm Biopsy Large Intestine, Other SURGICAL PATHOLOGY EXAM Francis Chow MD 3/2/2021 11:06 AM       Findings: 1. Several right-sided polyps  2. 6 mm Sessile polyp at 70 cm (piecemeal resection)  3. Diminutive polyp at 30 cm - resected  4. Colon otherwise normal

## 2021-03-02 NOTE — ANESTHESIA POSTPROCEDURE EVALUATION
Patient: Lan Manuel    Procedure(s):  COLONOSCOPY, WITH POLYPECTOMY AND BIOPSY  Tattooing, With Colonoscopy    Diagnosis:Diverticulitis of colon [K57.32]  Diagnosis Additional Information: No value filed.    Anesthesia Type:  General    Note:  Disposition: Outpatient   Postop Pain Control: Uneventful            Sign Out: Well controlled pain   PONV: No   Neuro/Psych: Uneventful            Sign Out: Acceptable/Baseline neuro status   Airway/Respiratory: Uneventful            Sign Out: Acceptable/Baseline resp. status   CV/Hemodynamics: Uneventful            Sign Out: Acceptable CV status   Other NRE: NONE   DID A NON-ROUTINE EVENT OCCUR? No         Last vitals:  Vitals:    03/02/21 0949   BP: (!) 135/93   Resp: 18   Temp: 37.1  C (98.7  F)   SpO2: 97%       Last vitals prior to Anesthesia Care Transfer:  CRNA VITALS  3/2/2021 1041 - 3/2/2021 1111      3/2/2021             Pulse:  72    SpO2:  95 %          Electronically Signed By: VICKIE Patricia CRNA  March 2, 2021  11:11 AM

## 2021-03-03 NOTE — ADDENDUM NOTE
Addendum  created 03/03/21 1147 by Fe Aviles APRN CRNA    Intraprocedure Event edited, Intraprocedure Staff edited

## 2021-03-05 LAB — COPATH REPORT: NORMAL

## 2021-03-16 PROBLEM — D12.6 ADENOMATOUS COLON POLYP: Status: ACTIVE | Noted: 2021-03-16

## 2021-03-27 ENCOUNTER — COMMUNICATION - HEALTHEAST (OUTPATIENT)
Dept: FAMILY MEDICINE | Facility: CLINIC | Age: 61
End: 2021-03-27

## 2021-05-26 ENCOUNTER — RECORDS - HEALTHEAST (OUTPATIENT)
Dept: ADMINISTRATIVE | Facility: CLINIC | Age: 61
End: 2021-05-26

## 2021-05-30 ENCOUNTER — RECORDS - HEALTHEAST (OUTPATIENT)
Dept: ADMINISTRATIVE | Facility: CLINIC | Age: 61
End: 2021-05-30

## 2021-05-30 VITALS — BODY MASS INDEX: 27.75 KG/M2 | WEIGHT: 182.5 LBS

## 2021-05-30 VITALS — HEIGHT: 68 IN | BODY MASS INDEX: 27.47 KG/M2 | WEIGHT: 181.25 LBS

## 2021-05-31 NOTE — TELEPHONE ENCOUNTER
Refill Approved    Rx renewed per Medication Renewal Policy. Medication was last renewed on 2/6/19  #90 R-1.    Last OV 2/6/19    Carol Swanson, [facility] Triage/Med Refill 8/30/2019     Requested Prescriptions   Pending Prescriptions Disp Refills     lisinopril (PRINIVIL,ZESTRIL) 10 MG tablet [Pharmacy Med Name: LISINOPRIL 10 MG TABLET] 90 tablet 1     Sig: TAKE 1 TABLET BY MOUTH EVERY DAY       [facility] Refill Protocol Passed - 8/29/2019  1:32 AM        Passed - PCP or prescribing provider visit in past 12 months       Last office visit with prescriber/PCP: 2/6/2019 Jenniffer Matt MD OR same dept: 2/6/2019 Jenniffer Matt MD OR same specialty: 2/6/2019 Jenniffer Matt MD  Last physical: Visit date not found Last MTM visit: Visit date not found   Next visit within 3 mo: Visit date not found  Next physical within 3 mo: Visit date not found  Prescriber OR PCP: Jenniffer Matt MD  Last diagnosis associated with med order: 1. Essential hypertension  - lisinopril (PRINIVIL,ZESTRIL) 10 MG tablet [Pharmacy Med Name: LISINOPRIL 10 MG TABLET]; TAKE 1 TABLET BY MOUTH EVERY DAY  Dispense: 90 tablet; Refill: 1    If protocol passes may refill for 12 months if within 3 months of last provider visit (or a total of 15 months).             Passed - Serum Potassium in past 12 months     Lab Results   Component Value Date    Potassium 4.1 02/06/2019             Passed - Blood pressure filed in past 12 months     BP Readings from Last 1 Encounters:   02/06/19 138/88             Passed - Serum Creatinine in past 12 months     Creatinine   Date Value Ref Range Status   02/06/2019 0.92 0.70 - 1.30 mg/dL Final

## 2021-06-01 ENCOUNTER — RECORDS - HEALTHEAST (OUTPATIENT)
Dept: ADMINISTRATIVE | Facility: CLINIC | Age: 61
End: 2021-06-01

## 2021-06-02 VITALS — HEIGHT: 68 IN | BODY MASS INDEX: 28.98 KG/M2 | WEIGHT: 191.19 LBS

## 2021-06-04 ENCOUNTER — RECORDS - HEALTHEAST (OUTPATIENT)
Dept: FAMILY MEDICINE | Facility: CLINIC | Age: 61
End: 2021-06-04

## 2021-06-08 NOTE — TELEPHONE ENCOUNTER
Called pt, left a msg to call back. When call is returned, please relay MD notes, assist in scheduling a video visit and document. Thank you.

## 2021-06-08 NOTE — TELEPHONE ENCOUNTER
RN cannot approve Refill Request    RN can NOT refill this medication PCP messaged that patient is overdue for Labs and Office Visit. Last office visit: 2/6/2019 Jenniffer Matt MD Last Physical: Visit date not found Last MTM visit: Visit date not found Last visit same specialty: 2/6/2019 Jenniffer Matt MD.  Next visit within 3 mo: Visit date not found  Next physical within 3 mo: Visit date not found      Karen Mack, [facility] Triage/Med Refill 5/30/2020    Requested Prescriptions   Pending Prescriptions Disp Refills     lisinopriL (PRINIVIL,ZESTRIL) 10 MG tablet [Pharmacy Med Name: LISINOPRIL 10 MG TABLET] 90 tablet 2     Sig: TAKE 1 TABLET BY MOUTH EVERY DAY       [facility] Inhib[facility] Refill Protocol Failed - 5/27/2020 12:21 AM        Failed - PCP or prescribing provider visit in past 12 months       Last office visit with prescriber/PCP: 2/6/2019 Jenniffer Matt MD OR same dept: Visit date not found OR same specialty: 2/6/2019 Jenniffer Matt MD  Last physical: Visit date not found Last MTM visit: Visit date not found   Next visit within 3 mo: Visit date not found  Next physical within 3 mo: Visit date not found  Prescriber OR PCP: Jenniffer Matt MD  Last diagnosis associated with med order: 1. Essential hypertension  - lisinopriL (PRINIVIL,ZESTRIL) 10 MG tablet [Pharmacy Med Name: LISINOPRIL 10 MG TABLET]; TAKE 1 TABLET BY MOUTH EVERY DAY  Dispense: 90 tablet; Refill: 2    If protocol passes may refill for 12 months if within 3 months of last provider visit (or a total of 15 months).             Failed - Serum Potassium in past 12 months     No results found for: LN-POTASSIUM          Failed - Blood pressure filed in past 12 months     BP Readings from Last 1 Encounters:   02/06/19 138/88             Failed - Serum Creatinine in past 12 months     Creatinine   Date Value Ref Range Status   02/06/2019 0.92 0.70 - 1.30 mg/dL Final

## 2021-06-08 NOTE — TELEPHONE ENCOUNTER
Please help this patient make a video appointment and route back to me for limited refill if needed    We can order labs if needed    Thanks!    BB

## 2021-06-08 NOTE — TELEPHONE ENCOUNTER
Left message for pt to call back to schedule a video visit.  Please assist with scheduling pt with Dr. Matt for a video visit(on [place] schedule)
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
## 2021-06-08 NOTE — TELEPHONE ENCOUNTER
Left 2nd message for pt to call back to schedule a video visit.  Please assist with scheduling pt with Dr. Matt for a video visit(on [place] schedule)

## 2021-06-08 NOTE — PROGRESS NOTES
"Lan Horton is a 59 y.o. male who is being evaluated via a billable telephone visit.      The patient has been notified of following:     \"This telephone visit will be conducted via a call between you and your physician/provider. We have found that certain health care needs can be provided without the need for a physical exam.  This service lets us provide the care you need with a short phone conversation.  If a prescription is necessary we can send it directly to your pharmacy.  If lab work is needed we can place an order for that and you can then stop by our lab to have the test done at a later time.    Telephone visits are billed at different rates depending on your insurance coverage. During this emergency period, for some insurers they may be billed the same as an in-person visit.  Please reach out to your insurance provider with any questions.    If during the course of the call the physician/provider feels a telephone visit is not appropriate, you will not be charged for this service.\"    Patient has given verbal consent to a Telephone visit? Yes    What phone number would you like to be contacted at? [phone]    Patient would like to receive their AVS by AVS Preference: Mail a copy.    --------------------------------    Assessment/Plan:    Lan Horton is a 59 y.o. male who is being evaluated remotely for:    1. Essential hypertension  Labs below will be ordered.  Refill medication given.  When he comes in for the labs he will set up a blood pressure check as well.    We did discuss doing a PSA test however he is not interested at this point.    We will plan on seeing him in 1 year for complete physical.  - Lipid [place] FASTING; Future  - Comprehensive Metabolic Panel; Future  - lisinopriL (PRINIVIL,ZESTRIL) 10 MG tablet; TAKE 1 TABLET BY MOUTH EVERY DAY  Dispense: 90 tablet; Refill: 3        Medications Discontinued During This Encounter   Medication Reason     lisinopril " (PRINIVIL,ZESTRIL) 10 MG tablet Reorder           Chief Complaint:  Chief Complaint   Patient presents with     Medication Education Visit     Med Check     Medication Refill     Lisinopril       Subjective:   Lan Horton is a 59 y.o. male who is being evaluated via telephone visit today for a medication check.  Patient has a past medical history significant for hypertension.  He takes low-dose lisinopril.  He does well with the medication and does not have any questions or concerns.    He does not check his blood pressures at home and last her blood pressure was 138/88.    He denies chest pain or shortness of breath.        12 point review of systems completed and negative except for what has been described above    Social History     Tobacco Use   Smoking Status Former Smoker     Packs/day: 1.00     Types: Cigarettes     Last attempt to quit: 1/14/2017     Years since quitting: 3.4   Smokeless Tobacco Never Used       Current Outpatient Medications   Medication Sig     CALCIUM CARBONATE (CALCIUM 600 ORAL) Take by mouth.     cholecalciferol, vitamin D3, 400 unit Tab Take 1,000 Units by mouth 2 (two) times a day.     lisinopriL (PRINIVIL,ZESTRIL) 10 MG tablet TAKE 1 TABLET BY MOUTH EVERY DAY     MULTIVIT WITH MINERALS/LUTEIN (MULTIVITAMIN 50 PLUS ORAL) Take by mouth.     omega-3 fatty acids-fish oil (FISH OIL) 360-1,200 mg cap Take by mouth.         Objective:  No vitals were done due to the remote nature of this visit    General: No acute distress, sounds well  Psych: Appropriate affect, pleasant  Pulmonary: Breathing comfortably, speaking in complete sentences     This note has been dictated and transcribed using voice recognition software.   Any errors in transcription are unintentional and inherent to the software.      Phone call duration: 17 minutes    Jenniffer Matt MD

## 2021-06-10 NOTE — PROGRESS NOTES
Assessment/Plan:    Lan Horton is a 56 y.o. male presenting for:    1. Healthcare maintenance  - Basic Metabolic Panel  - Hemoglobin  - Glycosylated Hemoglobin A1c  - PSA (Prostatic-Specific Antigen), Annual Screen  - Lipid [place] RANDOM    2. Essential hypertension  Within goal today.  Refill of lisinopril given.  BMP done as above.  - lisinopril (PRINIVIL,ZESTRIL) 10 MG tablet; TAKE 1 TABLET BY MOUTH ONCE DAILY  Dispense: 90 tablet; Refill: 3    3. Tick bite  He is a bit unclear whether this was a wood tick or a deer tick.  Given his description it sounds a bit more like a tick however because he is unsure I will send a one-time dose of doxycycline to the pharmacy.  We discussed the risks, benefits, and most common side effects of the medication.  - doxycycline (VIBRA-TABS) 100 MG tablet; Take 2 tablets (200 mg total) by mouth once for 1 dose.  Dispense: 2 tablet; Refill: 0        Medications Discontinued During This Encounter   Medication Reason     albuterol (PROVENTIL HFA;VENTOLIN HFA) 90 mcg/actuation inhaler Therapy completed     cyclobenzaprine (FLEXERIL) 10 MG tablet Therapy completed     meloxicam (MOBIC) 15 MG tablet Therapy completed     oxyCODONE-acetaminophen (PERCOCET) 5-325 mg per tablet Therapy completed     senna-docusate (SENNOSIDES-DOCUSATE SODIUM) 8.6-50 mg tablet Therapy completed     lisinopril (PRINIVIL,ZESTRIL) 10 MG tablet Reorder           Chief Complaint:  Chief Complaint   Patient presents with     Blood Pressure Check     Medication Education Visit     Med check     Medication Refill     Tick Removal     R groin area- pulled off at 4am today- has a rash and is sore to the touch       Subjective:   Lan Horton is a very pleasant 56-year-old gentleman presenting to the clinic today for 2 concerns:    1.  Tick bite: Patient was cleaning some brush in his backyard 2 days ago.  He not noticed any ticks on him at that point but this morning he pulled off a small tick from his  "groin area.  He believes he removed the entire thing.  The area is slightly red.  He is unsure if this was he wood tick or a deer tick.  He was not wearing his glasses when he pulled off but he states that it did seem to fairly small.    2.  Hypertension: Patient is a past medical history significant for hypertension.  He currently takes lisinopril 10 mg daily.  He is doing well with the medication.  He denies any chest pain or shortness of breath.    12 point review of systems completed and negative except for what has been described above    History   Smoking Status     Former Smoker     Packs/day: 1.00     Types: Cigarettes     Quit date: 1/14/2017   Smokeless Tobacco     Never Used       Current Outpatient Prescriptions   Medication Sig     CALCIUM CARBONATE (CALCIUM 600 ORAL) Take by mouth.     cholecalciferol, vitamin D3, 400 unit Tab Take 1,000 Units by mouth 2 (two) times a day.     lisinopril (PRINIVIL,ZESTRIL) 10 MG tablet TAKE 1 TABLET BY MOUTH ONCE DAILY     MULTIVIT WITH MINERALS/LUTEIN (MULTIVITAMIN 50 PLUS ORAL) Take by mouth.     omega-3 fatty acids-fish oil (FISH OIL) 360-1,200 mg cap Take by mouth.         Objective:  Vitals:    05/22/17 1523   BP: 128/80   Pulse: 76   Resp: 16   Temp: 98.6  F (37  C)   TempSrc: Oral   Weight: 181 lb 4 oz (82.2 kg)   Height: 5' 8\" (1.727 m)       Vital signs reviewed and stable  General: No acute distress  Psych: Appropriate affect  HEENT: moist mucous membranes, pupils equal, round, reactive to light and accomodation, posterior oropharynx clear of erythema or exudate, tympanic membranes are pearly grey bilaterally  Lymph: no cervical or supraclavicular lymphadenopathy  Cardiovascular: regular rate and rhythm with no murmur  Pulmonary: clear to auscultation bilaterally with no wheeze  Abdomen: soft, non tender, non distended with normo-active bowel sounds  Extremities: warm and well perfused with no edema  Skin: warm and dry with no rash, there is a small area of " erythema in the patient's right groin.  There is a small scab centrally but no take is noted.         This note has been dictated and transcribed using voice recognition software.   Any errors in transcription are unintentional and inherent to the software.

## 2021-06-18 NOTE — LETTER
Letter by Jenniffer Matt MD at      Author: Jenniffer Matt MD Service: -- Author Type: --    Filed:  Encounter Date: 2/10/2019 Status: (Other)       Lan Leete  [address]   [place] [address]             February 10, 2019         Dear Royce SanchezClifford,    Below are the results from your recent visit:    Resulted Orders   Glycosylated Hemoglobin A1c   Result Value Ref Range    Hemoglobin A1c 5.5 3.5 - 6.0 %   Lipid Cascade RANDOM   Result Value Ref Range    Cholesterol 186 <=199 mg/dL    Triglycerides 105 <=149 mg/dL    HDL Cholesterol 67 >=40 mg/dL    LDL Calculated 98 <=129 mg/dL    Patient Fasting > 8hrs? No    Basic Metabolic Panel   Result Value Ref Range    Sodium 141 136 - 145 mmol/L    Potassium 4.1 3.5 - 5.0 mmol/L    Chloride 104 98 - 107 mmol/L    CO2 28 22 - 31 mmol/L    Anion Gap, Calculation 9 5 - 18 mmol/L    Glucose 97 70 - 125 mg/dL    Calcium 9.9 8.5 - 10.5 mg/dL    BUN 15 8 - 22 mg/dL    Creatinine 0.92 0.70 - 1.30 mg/dL    GFR MDRD Af Amer >60 >60 mL/min/1.73m2    GFR MDRD Non Af Amer >60 >60 mL/min/1.73m2    Narrative    Fasting Glucose reference range is 70-99 mg/dL per  American Diabetes Association (ADA) guidelines.       Cholesterol, screening for diabetes, kidneys, electrolytes and blood sugar are normal.       Please call with questions or contact us using [facility].    Sincerely,        Electronically signed by Jenniffer Matt MD

## 2021-06-19 NOTE — LETTER
Letter by Jenniffer Matt MD at      Author: Jenniffer Matt MD Service: -- Author Type: --    Filed:  Encounter Date: 7/10/2019 Status: (Other)         Lan Horton  [address] [place] [address]             July 10, 2019        Dear Lan Horton :    Dr. aMtt was reviewing your chart and noticed that you are due for a colonoscopy. Your last discussion pertaining to a colonoscopy or cologuard screening was on 2/6/2019 with Dr. Jenniffer Matt. At that visit an order was entered for a cologuard screening kit to be delivered to your home to complete and mail back to [facility].     The cologuard screening is recommended every 3 years. This test looks for DNA changes in the cells of your stool. These DNA changes might be signs of cancer. It also looks for hidden blood in your stool. For this test, you collect an entire bowel movement, which is done using a special container put in the toilet.    To prevent delays in your care, please call [facility] [phone] to schedule a screening colonoscopy or the [facility] [phone] to get a new cologuard screening kit.    Sincerely,  Your care team at [facility]

## 2021-06-20 NOTE — LETTER
Letter by Jenniffer Matt MD at      Author: Jenniffer Matt MD Service: -- Author Type: --    Filed:  Encounter Date: 1/20/2020 Status: Signed       Lan Horton  [address] [place] [address]    January 20, 2020    Dear Lan    In reviewing your records, we have determined a gap in your preventive services. Based on your age and health history, we recommend the follow service.     ? General Physical  ? Physical with a Pap Smear  ? Colon cancer screening  ? Mammogram  ? Immunization  ? Diabetic check  ? Blood pressure/cardiovascular check  ? Asthma check  ? Cholesterol test  ? Lab work  ? Med check    If you have had the service elsewhere, please contact us so we can update our records. Please let us know if you have transferred your care to another clinic.    Please call [phone] to schedule this appointment.    We believe that a strong preventive care program, including regular physicals and follow-up care is an important part of a healthy lifestyle and we are committed to helping you maintain your health.    Thank you for choosing us as your health care provider.    Sincerely,   [facility]  [address] [facility] [address]  Phone Number:  [phone]

## 2021-06-23 NOTE — PROGRESS NOTES
Assessment/Plan:    Lan Horton is a 58 y.o. male presenting for:    1. Essential hypertension  Blood pressure slightly better on recheck today but I feel strongly that he needs to be back on his lisinopril medication.  This was sent to the pharmacy.  I have encouraged him to come the clinic for nurse only blood pressure check in the next couple weeks.  I would like to see him back here in 6 months for a complete physical examination.  Otherwise, lab below will be drawn.  - lisinopril (PRINIVIL,ZESTRIL) 10 MG tablet; TAKE 1 TABLET BY MOUTH ONCE DAILY.  Dispense: 90 tablet; Refill: 1  - Glycosylated Hemoglobin A1c  - Lipid Cascade RANDOM  - Basic Metabolic Panel    2. Screen for colon cancer  Cologuard test will be ordered today.  - Cologuard        Medications Discontinued During This Encounter   Medication Reason     lisinopril (PRINIVIL,ZESTRIL) 10 MG tablet Reorder           Chief Complaint:  Chief Complaint   Patient presents with     Medication Education Visit     Med check     Medication Refill       Subjective:   Lan Horton is a very pleasant 58-year-old gentleman presenting to the clinic today with concerns over hypertension.  The patient has a past medical history significant for hypertension and is on lisinopril 10 mg daily.  He unfortunately ran out of the medication about a week ago and has not been taking it.  He states that he feels more flushed when he does not take the medication.  He denies any chest pain or shortness of breath.    He did quit smoking back in 2017 but is unfortunately smoking again.  He smokes approximately 1 pack/day but does not feels though he would like any help quitting at this point.  He plans to quit again sometime soon.    He has not followed up with any colon cancer screening.  He states that he is unwilling to do a colonoscopy but would consider doing the cologuard.    12 point review of systems completed and negative except for what has been described  "above    Social History     Tobacco Use   Smoking Status Former Smoker     Packs/day: 1.00     Types: Cigarettes     Last attempt to quit: 2017     Years since quittin.0   Smokeless Tobacco Never Used       Current Outpatient Medications   Medication Sig     CALCIUM CARBONATE (CALCIUM 600 ORAL) Take by mouth.     cholecalciferol, vitamin D3, 400 unit Tab Take 1,000 Units by mouth 2 (two) times a day.     lisinopril (PRINIVIL,ZESTRIL) 10 MG tablet TAKE 1 TABLET BY MOUTH ONCE DAILY.     MULTIVIT WITH MINERALS/LUTEIN (MULTIVITAMIN 50 PLUS ORAL) Take by mouth.     omega-3 fatty acids-fish oil (FISH OIL) 360-1,200 mg cap Take by mouth.         Objective:  Vitals:    19 1455 19 1509   BP: 144/78 138/88   Pulse: 76    Resp: 16    Temp: 98.9  F (37.2  C)    TempSrc: Oral    Weight: 191 lb 3 oz (86.7 kg)    Height: 5' 8\" (1.727 m)        Body mass index is 29.07 kg/m .    Vital signs reviewed and stable  General: No acute distress  Psych: Appropriate affect  HEENT: moist mucous membranes, pupils equal, round, reactive to light and accomodation, posterior oropharynx clear of erythema or exudate, tympanic membranes are pearly grey bilaterally  Lymph: no cervical or supraclavicular lymphadenopathy  Cardiovascular: regular rate and rhythm with no murmur  Pulmonary: clear to auscultation bilaterally with no wheeze  Abdomen: soft, non tender, non distended with normo-active bowel sounds  Extremities: warm and well perfused with no edema  Skin: warm and dry with no rash         This note has been dictated and transcribed using voice recognition software.   Any errors in transcription are unintentional and inherent to the software.  "

## 2021-06-23 NOTE — TELEPHONE ENCOUNTER
Please help this patient make an appointment and route back to me for limited refill    Thanks!    BB     No

## 2021-06-23 NOTE — TELEPHONE ENCOUNTER
RN cannot approve Refill Request    RN can NOT refill this medication PCP messaged that patient is overdue for Labs and Office Visit.       Dayna Christian, [facility] Triage/Med Refill 1/31/2019    Requested Prescriptions   Pending Prescriptions Disp Refills     lisinopril (PRINIVIL,ZESTRIL) 10 MG tablet 90 tablet 3     Sig: TAKE 1 TABLET BY MOUTH ONCE DAILY    Ace Inhibitors Refill Protocol Failed - 1/31/2019  9:48 AM       Failed - PCP or prescribing provider visit in past 12 months      Last office visit with prescriber/PCP: 5/22/2017 Jenniffer Matt MD OR same dept: Visit date not found OR same specialty: 5/22/2017 Jenniffer Matt MD  Last physical: Visit date not found Last MTM visit: Visit date not found   Next visit within 3 mo: Visit date not found  Next physical within 3 mo: Visit date not found  Prescriber OR PCP: Jenniffer Matt MD  Last diagnosis associated with med order: 1. Essential hypertension  - lisinopril (PRINIVIL,ZESTRIL) 10 MG tablet; TAKE 1 TABLET BY MOUTH ONCE DAILY  Dispense: 90 tablet; Refill: 3    If protocol passes may refill for 12 months if within 3 months of last provider visit (or a total of 15 months).            Failed - Serum Potassium in past 12 months    No results found for: LN-POTASSIUM         Failed - Blood pressure filed in past 12 months    BP Readings from Last 1 Encounters:   05/22/17 128/80            Failed - Serum Creatinine in past 12 months    Creatinine   Date Value Ref Range Status   05/22/2017 0.89 0.70 - 1.30 mg/dL Final

## 2021-06-25 NOTE — TELEPHONE ENCOUNTER
Last seen for VV 6/2020 and OV 2/2019. 1 month rx signed. Needs appointment before additional refills.  Please let patient know PCP is now at [facility] and should establish care with her there, otherwise will need to establish care with new PCP.

## 2021-06-25 NOTE — TELEPHONE ENCOUNTER
RN cannot approve Refill Request    RN can NOT refill this medication PCP messaged that patient is overdue for Labs. Last office visit: 2/6/2019 Jenniffer Matt MD Last Physical: Visit date not found Last MTM visit: Visit date not found Last visit same specialty: 2/6/2019 Jenniffer Matt MD.  Next visit within 3 mo: Visit date not found  Next physical within 3 mo: Visit date not found      Karen Mack, [facility] Triage/Med Refill 6/4/2021    Requested Prescriptions   Pending Prescriptions Disp Refills     lisinopriL (PRINIVIL,ZESTRIL) 10 MG tablet [Pharmacy Med Name: LISINOPRIL 10 MG TABLET] 90 tablet 3     Sig: TAKE 1 TABLET BY MOUTH EVERY DAY       Ace Inhibitors Refill Protocol Failed - 6/4/2021 12:25 AM        Failed - Serum Potassium in past 12 months     No results found for: LN-POTASSIUM          Failed - Blood pressure filed in past 12 months     BP Readings from Last 1 Encounters:   02/06/19 138/88             Failed - Serum Creatinine in past 12 months     Creatinine   Date Value Ref Range Status   02/06/2019 0.92 0.70 - 1.30 mg/dL Final             Passed - PCP or prescribing provider visit in past 12 months       Last office visit with prescriber/PCP: 2/6/2019 Jenniffer Matt MD OR same dept: Visit date not found OR same specialty: 2/6/2019 Jenniffer Matt MD  Last physical: Visit date not found Last MTM visit: Visit date not found   Next visit within 3 mo: Visit date not found  Next physical within 3 mo: Visit date not found  Prescriber OR PCP: Jenniffer Matt MD  Last diagnosis associated with med order: 1. Essential hypertension  - lisinopriL (PRINIVIL,ZESTRIL) 10 MG tablet [Pharmacy Med Name: LISINOPRIL 10 MG TABLET]; TAKE 1 TABLET BY MOUTH EVERY DAY  Dispense: 90 tablet; Refill: 3    If protocol passes may refill for 12 months if within 3 months of last provider visit (or a total of 15 months).

## 2021-06-25 NOTE — TELEPHONE ENCOUNTER
First Attempt: I sent a my chart message to the patient to let us know if he is following Dr Matt at the [facility] or to schedule an Establish Care/Med Check appt.

## 2021-06-26 NOTE — TELEPHONE ENCOUNTER
2nd attempt: LMTCB to schedule with Dr. Matt at [phone] OR to establish care at the [facility]

## 2021-07-04 NOTE — TELEPHONE ENCOUNTER
Telephone Encounter by Nathalia Lechuga at 7/1/2021  7:48 AM     Author: Nathalia Lechuga Service: -- Author Type: --    Filed: 7/1/2021  7:48 AM Encounter Date: 6/4/2021 Status: Signed    : Nathalia Lechuga       Third Attempt: I mailed a letter to the patient to please contact our office to schedule an Est Care/Med Check appt. Closing until the patient responds. OK to schedule when the patient calls back.

## 2021-07-07 ENCOUNTER — COMMUNICATION - HEALTHEAST (OUTPATIENT)
Dept: FAMILY MEDICINE | Facility: CLINIC | Age: 61
End: 2021-07-07

## 2021-07-07 DIAGNOSIS — I10 ESSENTIAL HYPERTENSION: ICD-10-CM

## 2021-07-07 NOTE — TELEPHONE ENCOUNTER
Telephone Encounter by Chhaya Oreilly RN at 7/7/2021  8:26 AM     Author: Chhaya Oreilly RN Service: -- Author Type: Registered Nurse    Filed: 7/7/2021  8:26 AM Encounter Date: 7/7/2021 Status: Signed    : Chhaya Oreilly RN (Registered Nurse)       RN cannot approve Refill Request    RN can NOT refill this medication PCP messaged that patient is overdue for Labs and Office Visit. Last office visit: Visit date not found Last Physical: Visit date not found Last MTM visit: Visit date not found Last visit same specialty: 2/6/2019 Jenniffer Matt MD.  Next visit within 3 mo: Visit date not found  Next physical within 3 mo: Visit date not found      [facility] Triage/Med Refill 7/7/2021    Requested Prescriptions   Pending Prescriptions Disp Refills   ? lisinopriL (PRINIVIL,ZESTRIL) 10 MG tablet [Pharmacy Med Name: LISINOPRIL 10 MG TABLET] 30 tablet 0     Sig: TAKE 1 TABLET BY MOUTH EVERY DAY       Ace Inhibitors Refill Protocol Failed - 7/7/2021 12:31 AM        Failed - PCP or prescribing provider visit in past 12 months       Last office visit with prescriber/PCP: Visit date not found OR same dept: Visit date not found OR same specialty: 2/6/2019 Jenniffer Matt MD  Last physical: Visit date not found Last MTM visit: Visit date not found   Next visit within 3 mo: Visit date not found  Next physical within 3 mo: Visit date not found  Prescriber OR PCP: Kelley Xavier MD  Last diagnosis associated with med order: 1. Essential hypertension  - lisinopriL (PRINIVIL,ZESTRIL) 10 MG tablet [Pharmacy Med Name: LISINOPRIL 10 MG TABLET]; TAKE 1 TABLET BY MOUTH EVERY DAY  Dispense: 30 tablet; Refill: 0    If protocol passes may refill for 12 months if within 3 months of last provider visit (or a total of 15 months).             Failed - Serum Potassium in past 12 months     No results found for: LN-POTASSIUM          Failed - Blood pressure filed in past 12  months     BP Readings from Last 1 Encounters:   02/06/19 138/88             Failed - Serum Creatinine in past 12 months     Creatinine   Date Value Ref Range Status   02/06/2019 0.92 0.70 - 1.30 mg/dL Final

## 2021-08-15 ENCOUNTER — HEALTH MAINTENANCE LETTER (OUTPATIENT)
Age: 61
End: 2021-08-15

## 2021-10-11 ENCOUNTER — HEALTH MAINTENANCE LETTER (OUTPATIENT)
Age: 61
End: 2021-10-11

## 2022-01-19 ENCOUNTER — OFFICE VISIT (OUTPATIENT)
Dept: FAMILY MEDICINE | Facility: CLINIC | Age: 62
End: 2022-01-19
Payer: COMMERCIAL

## 2022-01-19 VITALS
HEART RATE: 72 BPM | TEMPERATURE: 98 F | DIASTOLIC BLOOD PRESSURE: 80 MMHG | HEIGHT: 68 IN | BODY MASS INDEX: 26.98 KG/M2 | OXYGEN SATURATION: 97 % | SYSTOLIC BLOOD PRESSURE: 122 MMHG | WEIGHT: 178 LBS

## 2022-01-19 DIAGNOSIS — Z11.59 NEED FOR HEPATITIS C SCREENING TEST: ICD-10-CM

## 2022-01-19 DIAGNOSIS — Z00.00 ROUTINE GENERAL MEDICAL EXAMINATION AT A HEALTH CARE FACILITY: Primary | ICD-10-CM

## 2022-01-19 DIAGNOSIS — Z23 HIGH PRIORITY FOR 2019-NCOV VACCINE: ICD-10-CM

## 2022-01-19 DIAGNOSIS — I10 PRIMARY HYPERTENSION: ICD-10-CM

## 2022-01-19 LAB
ANION GAP SERPL CALCULATED.3IONS-SCNC: 5 MMOL/L (ref 3–14)
BUN SERPL-MCNC: 16 MG/DL (ref 7–30)
CALCIUM SERPL-MCNC: 9.3 MG/DL (ref 8.5–10.1)
CHLORIDE BLD-SCNC: 105 MMOL/L (ref 94–109)
CO2 SERPL-SCNC: 27 MMOL/L (ref 20–32)
CREAT SERPL-MCNC: 1.05 MG/DL (ref 0.66–1.25)
GFR SERPL CREATININE-BSD FRML MDRD: 82 ML/MIN/1.73M2
GLUCOSE BLD-MCNC: 141 MG/DL (ref 70–99)
POTASSIUM BLD-SCNC: 4.1 MMOL/L (ref 3.4–5.3)
SODIUM SERPL-SCNC: 137 MMOL/L (ref 133–144)

## 2022-01-19 PROCEDURE — 0054A COVID-19,PF,PFIZER (12+ YRS): CPT | Performed by: FAMILY MEDICINE

## 2022-01-19 PROCEDURE — 80048 BASIC METABOLIC PNL TOTAL CA: CPT | Performed by: FAMILY MEDICINE

## 2022-01-19 PROCEDURE — 36415 COLL VENOUS BLD VENIPUNCTURE: CPT | Performed by: FAMILY MEDICINE

## 2022-01-19 PROCEDURE — 86803 HEPATITIS C AB TEST: CPT | Performed by: FAMILY MEDICINE

## 2022-01-19 PROCEDURE — 99396 PREV VISIT EST AGE 40-64: CPT | Mod: 25 | Performed by: FAMILY MEDICINE

## 2022-01-19 PROCEDURE — 91305 COVID-19,PF,PFIZER (12+ YRS): CPT | Performed by: FAMILY MEDICINE

## 2022-01-19 PROCEDURE — 90471 IMMUNIZATION ADMIN: CPT | Performed by: FAMILY MEDICINE

## 2022-01-19 PROCEDURE — 99213 OFFICE O/P EST LOW 20 MIN: CPT | Mod: 25 | Performed by: FAMILY MEDICINE

## 2022-01-19 PROCEDURE — 90682 RIV4 VACC RECOMBINANT DNA IM: CPT | Performed by: FAMILY MEDICINE

## 2022-01-19 RX ORDER — LISINOPRIL 10 MG/1
10 TABLET ORAL DAILY
Qty: 90 TABLET | Refills: 3 | Status: SHIPPED | OUTPATIENT
Start: 2022-01-19 | End: 2023-01-13

## 2022-01-19 ASSESSMENT — ENCOUNTER SYMPTOMS
HEMATOCHEZIA: 0
PALPITATIONS: 0
PARESTHESIAS: 0
MYALGIAS: 0
SORE THROAT: 0
CHILLS: 0
WEAKNESS: 0
FREQUENCY: 0
EYE PAIN: 0
NAUSEA: 0
NERVOUS/ANXIOUS: 0
DIARRHEA: 0
DIZZINESS: 0
HEADACHES: 0
SHORTNESS OF BREATH: 0
JOINT SWELLING: 0
DYSURIA: 0
FEVER: 0
HEMATURIA: 0
HEARTBURN: 0
ABDOMINAL PAIN: 0
ARTHRALGIAS: 0
CONSTIPATION: 0
COUGH: 0

## 2022-01-19 ASSESSMENT — MIFFLIN-ST. JEOR: SCORE: 1596.9

## 2022-01-19 NOTE — PROGRESS NOTES
SUBJECTIVE:   CC: Lan Manuel is an 61 year old male who presents for preventative health visit.     Patient has been advised of split billing requirements and indicates understanding: Yes  Healthy Habits:     Getting at least 3 servings of Calcium per day:  Yes    Bi-annual eye exam:  NO    Dental care twice a year:  NO    Sleep apnea or symptoms of sleep apnea:  None    Diet:  Regular (no restrictions)    Frequency of exercise:  None    Taking medications regularly:  Yes    Medication side effects:  None    PHQ-2 Total Score: 0    Additional concerns today:  No      Hypertension Follow-up    Do you check your blood pressure regularly outside of the clinic? No     Are you following a low salt diet? Yes    Are your blood pressures ever more than 140 on the top number (systolic) OR more   than 90 on the bottom number (diastolic), for example 140/90? No      Today's PHQ-2 Score:   PHQ-2 ( 1999 [employer]) 1/19/2022   Q1: Little interest or pleasure in doing things 0   Q2: Feeling down, depressed or hopeless 0   PHQ-2 Score 0   Q1: Little interest or pleasure in doing things Not at all   Q2: Feeling down, depressed or hopeless Not at all   PHQ-2 Score 0       Abuse: Current or Past(Physical, Sexual or Emotional)- No  Do you feel safe in your environment? Yes    Have you ever done Advance Care Planning? (For example, a Health Directive, POLST, or a discussion with a medical provider or your loved ones about your wishes): No, advance care planning information given to patient to review.  Patient plans to discuss their wishes with loved ones or provider.      Social History     Tobacco Use     Smoking status: Current Every Day Smoker     Types: Cigarettes     Smokeless tobacco: Never Used   Substance Use Topics     Alcohol use: Yes     Comment: 6-8     If you drink alcohol do you typically have >3 drinks per day or >7 drinks per week? No    Alcohol Use 1/19/2022   Prescreen: >3 drinks/day or >7 drinks/week? No   No  "flowsheet data found.      Last PSA:   Prostate Specific Antigen Screen   Date Value Ref Range Status   05/22/2017 0.5 0.0 - 3.5 ng/mL Final       Reviewed orders with patient. Reviewed health maintenance and updated orders accordingly - Yes  Lab work is in process    Reviewed and updated as needed this visit by clinical staff  Tobacco  Allergies  Meds   Med Hx  Surg Hx  Fam Hx  Soc Hx       Reviewed and updated as needed this visit by Provider                   Review of Systems   Constitutional: Negative for chills and fever.   HENT: Negative for congestion, ear pain, hearing loss and sore throat.    Eyes: Negative for pain and visual disturbance.   Respiratory: Negative for cough and shortness of breath.    Cardiovascular: Negative for chest pain, palpitations and peripheral edema.   Gastrointestinal: Negative for abdominal pain, constipation, diarrhea, heartburn, hematochezia and nausea.   Genitourinary: Negative for dysuria, frequency, genital sores, hematuria, impotence, penile discharge and urgency.   Musculoskeletal: Negative for arthralgias, joint swelling and myalgias.   Skin: Negative for rash.   Neurological: Negative for dizziness, weakness, headaches and paresthesias.   Psychiatric/Behavioral: Negative for mood changes. The patient is not nervous/anxious.          OBJECTIVE:   /80   Pulse 72   Temp 98  F (36.7  C) (Tympanic)   Ht 1.727 m (5' 8\")   Wt 80.7 kg (178 lb)   SpO2 97%   BMI 27.06 kg/m      Physical Exam  GENERAL: healthy, alert and no distress  EYES: Eyes grossly normal to inspection, PERRL and conjunctivae and sclerae normal  HENT: ear canals and TM's normal, nose and mouth without ulcers or lesions  NECK: no adenopathy, no asymmetry, masses, or scars and thyroid normal to palpation  RESP: lungs clear to auscultation - no rales, rhonchi or wheezes  CV: regular rate and rhythm, normal S1 S2, no S3 or S4, no murmur, click or rub, no peripheral edema and peripheral pulses " "strong  ABDOMEN: soft, nontender, no hepatosplenomegaly, no masses and bowel sounds normal  MS: no gross musculoskeletal defects noted, no edema  SKIN: no suspicious lesions or rashes  NEURO: Normal strength and tone, mentation intact and speech normal  PSYCH: mentation appears normal, affect normal/bright    Diagnostic Test Results:  Labs reviewed in Epic  No results found for this or any previous visit (from the past 24 hour(s)).    ASSESSMENT/PLAN:   (Z00.00) Routine general medical examination at a health care facility  (primary encounter diagnosis)  Comment:   Plan:     (Z23) High priority for 2019-nCoV vaccine  Comment:   Plan:     (Z11.59) Need for hepatitis C screening test  Comment:   Plan: Hepatitis C Screen Reflex to HCV RNA Quant and         Genotype            (I10) Primary hypertension  Comment:   Plan: lisinopril (ZESTRIL) 10 MG tablet, Basic         metabolic panel  (Ca, Cl, CO2, Creat, Gluc, K,         Na, BUN)        Well controlled labs back later today       Patient has been advised of split billing requirements and indicates understanding: Yes    COUNSELING:   Reviewed preventive health counseling, as reflected in patient instructions       Consider lung cancer screening for ages 55-80 years and 30 pack-year smoking history  Patient declined        The 10-year ASCVD risk score ([place] BOLES Jr., et al., 2013) is: 10.5%    Values used to calculate the score:      Age: 61 years      Sex: Male      Is Non- : No      Diabetic: No      Tobacco smoker: Yes      Systolic Blood Pressure: 122 mmHg      Is BP treated: Yes      HDL Cholesterol: 67 mg/dL      Total Cholesterol: 186 mg/dL    Estimated body mass index is 27.06 kg/m  as calculated from the following:    Height as of this encounter: 1.727 m (5' 8\").    Weight as of this encounter: 80.7 kg (178 lb).         He reports that he has been smoking cigarettes. He has never used smokeless tobacco.  Tobacco Cessation Action Plan: "   Information offered: Patient not interested at this time  He is not ready     Counseling Resources:  ATP IV Guidelines  Pooled Cohorts Equation Calculator  FRAX Risk Assessment  ICSI Preventive Guidelines  Dietary Guidelines for Americans, 2010  USDA's MyPlate  ASA Prophylaxis  Lung CA Screening    Amrita Parnell MD  [facility]

## 2022-01-20 LAB — HCV AB SERPL QL IA: NONREACTIVE

## 2022-01-20 NOTE — RESULT ENCOUNTER NOTE
Lan,  Your lab results were normal/stable. Please feel free to my chart or call the office with questions. Amrita Parnell M.D.

## 2022-09-25 ENCOUNTER — HEALTH MAINTENANCE LETTER (OUTPATIENT)
Age: 62
End: 2022-09-25

## 2023-01-13 DIAGNOSIS — I10 PRIMARY HYPERTENSION: ICD-10-CM

## 2023-01-13 RX ORDER — LISINOPRIL 10 MG/1
10 TABLET ORAL DAILY
Qty: 90 TABLET | Refills: 0 | Status: SHIPPED | OUTPATIENT
Start: 2023-01-13 | End: 2023-08-21

## 2023-05-08 ENCOUNTER — HEALTH MAINTENANCE LETTER (OUTPATIENT)
Age: 63
End: 2023-05-08

## 2023-09-19 DIAGNOSIS — I10 PRIMARY HYPERTENSION: ICD-10-CM

## 2023-09-19 RX ORDER — LISINOPRIL 10 MG/1
TABLET ORAL
Qty: 30 TABLET | Refills: 0 | Status: SHIPPED | OUTPATIENT
Start: 2023-09-19 | End: 2023-10-10

## 2023-10-09 PROBLEM — M67.40 GANGLION: Status: ACTIVE | Noted: 2023-10-09

## 2023-10-10 ENCOUNTER — HOSPITAL ENCOUNTER (INPATIENT)
Facility: CLINIC | Age: 63
LOS: 2 days | Discharge: ANOTHER HOSPITAL | End: 2023-10-12
Attending: FAMILY MEDICINE | Admitting: STUDENT IN AN ORGANIZED HEALTH CARE EDUCATION/TRAINING PROGRAM
Payer: COMMERCIAL

## 2023-10-10 ENCOUNTER — APPOINTMENT (OUTPATIENT)
Dept: GENERAL RADIOLOGY | Facility: CLINIC | Age: 63
End: 2023-10-10
Attending: FAMILY MEDICINE
Payer: COMMERCIAL

## 2023-10-10 ENCOUNTER — OFFICE VISIT (OUTPATIENT)
Dept: FAMILY MEDICINE | Facility: CLINIC | Age: 63
End: 2023-10-10
Payer: COMMERCIAL

## 2023-10-10 VITALS
HEART RATE: 92 BPM | DIASTOLIC BLOOD PRESSURE: 60 MMHG | BODY MASS INDEX: 28.61 KG/M2 | OXYGEN SATURATION: 96 % | SYSTOLIC BLOOD PRESSURE: 100 MMHG | RESPIRATION RATE: 16 BRPM | TEMPERATURE: 97.6 F | WEIGHT: 182.25 LBS | HEIGHT: 67 IN

## 2023-10-10 DIAGNOSIS — I10 PRIMARY HYPERTENSION: ICD-10-CM

## 2023-10-10 DIAGNOSIS — Z12.5 SCREENING FOR PROSTATE CANCER: ICD-10-CM

## 2023-10-10 DIAGNOSIS — I48.91 ATRIAL FIBRILLATION WITH RVR (H): ICD-10-CM

## 2023-10-10 DIAGNOSIS — R00.0 TACHYCARDIA: ICD-10-CM

## 2023-10-10 DIAGNOSIS — Z00.00 ROUTINE GENERAL MEDICAL EXAMINATION AT A HEALTH CARE FACILITY: Primary | ICD-10-CM

## 2023-10-10 DIAGNOSIS — I47.10 SVT (SUPRAVENTRICULAR TACHYCARDIA) (H): ICD-10-CM

## 2023-10-10 DIAGNOSIS — R93.89 ABNORMAL CXR: Primary | ICD-10-CM

## 2023-10-10 LAB
ALBUMIN SERPL BCG-MCNC: 4.5 G/DL (ref 3.5–5.2)
ALP SERPL-CCNC: 77 U/L (ref 40–129)
ALT SERPL W P-5'-P-CCNC: 33 U/L (ref 0–70)
ANION GAP SERPL CALCULATED.3IONS-SCNC: 9 MMOL/L (ref 7–15)
APTT PPP: 27 SECONDS (ref 22–38)
AST SERPL W P-5'-P-CCNC: 33 U/L (ref 0–45)
BASO+EOS+MONOS # BLD AUTO: ABNORMAL 10*3/UL
BASO+EOS+MONOS NFR BLD AUTO: ABNORMAL %
BASOPHILS # BLD AUTO: 0 10E3/UL (ref 0–0.2)
BASOPHILS NFR BLD AUTO: 1 %
BILIRUB SERPL-MCNC: 1.1 MG/DL
BUN SERPL-MCNC: 18.2 MG/DL (ref 8–23)
CALCIUM SERPL-MCNC: 10.1 MG/DL (ref 8.8–10.2)
CHLORIDE SERPL-SCNC: 103 MMOL/L (ref 98–107)
CREAT SERPL-MCNC: 0.97 MG/DL (ref 0.67–1.17)
DEPRECATED HCO3 PLAS-SCNC: 28 MMOL/L (ref 22–29)
EGFRCR SERPLBLD CKD-EPI 2021: 89 ML/MIN/1.73M2
EOSINOPHIL # BLD AUTO: 0 10E3/UL (ref 0–0.7)
EOSINOPHIL NFR BLD AUTO: 1 %
ERYTHROCYTE [DISTWIDTH] IN BLOOD BY AUTOMATED COUNT: 12.7 % (ref 10–15)
GLUCOSE SERPL-MCNC: 108 MG/DL (ref 70–99)
HCT VFR BLD AUTO: 45.7 % (ref 40–53)
HGB BLD-MCNC: 15.4 G/DL (ref 13.3–17.7)
IMM GRANULOCYTES # BLD: 0 10E3/UL
IMM GRANULOCYTES NFR BLD: 0 %
INR PPP: 1.02 (ref 0.85–1.15)
LYMPHOCYTES # BLD AUTO: 2.2 10E3/UL (ref 0.8–5.3)
LYMPHOCYTES NFR BLD AUTO: 33 %
MAGNESIUM SERPL-MCNC: 2 MG/DL (ref 1.7–2.3)
MCH RBC QN AUTO: 34.2 PG (ref 26.5–33)
MCHC RBC AUTO-ENTMCNC: 33.7 G/DL (ref 31.5–36.5)
MCV RBC AUTO: 102 FL (ref 78–100)
MONOCYTES # BLD AUTO: 0.7 10E3/UL (ref 0–1.3)
MONOCYTES NFR BLD AUTO: 11 %
NEUTROPHILS # BLD AUTO: 3.7 10E3/UL (ref 1.6–8.3)
NEUTROPHILS NFR BLD AUTO: 54 %
NRBC # BLD AUTO: 0 10E3/UL
NRBC BLD AUTO-RTO: 0 /100
PLATELET # BLD AUTO: 193 10E3/UL (ref 150–450)
POTASSIUM SERPL-SCNC: 4.7 MMOL/L (ref 3.4–5.3)
PROT SERPL-MCNC: 7 G/DL (ref 6.4–8.3)
RBC # BLD AUTO: 4.5 10E6/UL (ref 4.4–5.9)
SODIUM SERPL-SCNC: 140 MMOL/L (ref 135–145)
TSH SERPL DL<=0.005 MIU/L-ACNC: 2.49 UIU/ML (ref 0.3–4.2)
WBC # BLD AUTO: 6.7 10E3/UL (ref 4–11)

## 2023-10-10 PROCEDURE — 84443 ASSAY THYROID STIM HORMONE: CPT | Performed by: FAMILY MEDICINE

## 2023-10-10 PROCEDURE — 258N000003 HC RX IP 258 OP 636: Performed by: FAMILY MEDICINE

## 2023-10-10 PROCEDURE — 85730 THROMBOPLASTIN TIME PARTIAL: CPT | Performed by: FAMILY MEDICINE

## 2023-10-10 PROCEDURE — 80053 COMPREHEN METABOLIC PANEL: CPT | Performed by: FAMILY MEDICINE

## 2023-10-10 PROCEDURE — 96366 THER/PROPH/DIAG IV INF ADDON: CPT

## 2023-10-10 PROCEDURE — 36415 COLL VENOUS BLD VENIPUNCTURE: CPT | Performed by: FAMILY MEDICINE

## 2023-10-10 PROCEDURE — 250N000009 HC RX 250: Performed by: FAMILY MEDICINE

## 2023-10-10 PROCEDURE — 96365 THER/PROPH/DIAG IV INF INIT: CPT

## 2023-10-10 PROCEDURE — 250N000013 HC RX MED GY IP 250 OP 250 PS 637: Performed by: FAMILY MEDICINE

## 2023-10-10 PROCEDURE — 96367 TX/PROPH/DG ADDL SEQ IV INF: CPT

## 2023-10-10 PROCEDURE — 96375 TX/PRO/DX INJ NEW DRUG ADDON: CPT

## 2023-10-10 PROCEDURE — 99396 PREV VISIT EST AGE 40-64: CPT | Mod: 25 | Performed by: FAMILY MEDICINE

## 2023-10-10 PROCEDURE — 99285 EMERGENCY DEPT VISIT HI MDM: CPT | Mod: 25 | Performed by: FAMILY MEDICINE

## 2023-10-10 PROCEDURE — 99285 EMERGENCY DEPT VISIT HI MDM: CPT | Mod: 25

## 2023-10-10 PROCEDURE — 93010 ELECTROCARDIOGRAM REPORT: CPT | Mod: 76 | Performed by: FAMILY MEDICINE

## 2023-10-10 PROCEDURE — 93005 ELECTROCARDIOGRAM TRACING: CPT | Mod: 76

## 2023-10-10 PROCEDURE — 85610 PROTHROMBIN TIME: CPT | Performed by: FAMILY MEDICINE

## 2023-10-10 PROCEDURE — 83735 ASSAY OF MAGNESIUM: CPT | Performed by: FAMILY MEDICINE

## 2023-10-10 PROCEDURE — 250N000011 HC RX IP 250 OP 636: Mod: JZ | Performed by: FAMILY MEDICINE

## 2023-10-10 PROCEDURE — 71046 X-RAY EXAM CHEST 2 VIEWS: CPT

## 2023-10-10 PROCEDURE — 93000 ELECTROCARDIOGRAM COMPLETE: CPT | Performed by: FAMILY MEDICINE

## 2023-10-10 PROCEDURE — 93005 ELECTROCARDIOGRAM TRACING: CPT

## 2023-10-10 PROCEDURE — 96361 HYDRATE IV INFUSION ADD-ON: CPT

## 2023-10-10 PROCEDURE — 85025 COMPLETE CBC W/AUTO DIFF WBC: CPT | Performed by: FAMILY MEDICINE

## 2023-10-10 PROCEDURE — 200N000001 HC R&B ICU

## 2023-10-10 PROCEDURE — 93010 ELECTROCARDIOGRAM REPORT: CPT | Performed by: FAMILY MEDICINE

## 2023-10-10 PROCEDURE — 99207 PR INPT ADMISSION FROM CLINIC: CPT | Mod: 25 | Performed by: FAMILY MEDICINE

## 2023-10-10 PROCEDURE — 84100 ASSAY OF PHOSPHORUS: CPT

## 2023-10-10 RX ORDER — FOLIC ACID 1 MG/1
1 TABLET ORAL ONCE
Status: COMPLETED | OUTPATIENT
Start: 2023-10-10 | End: 2023-10-10

## 2023-10-10 RX ORDER — METOPROLOL TARTRATE 50 MG
50 TABLET ORAL ONCE
Status: COMPLETED | OUTPATIENT
Start: 2023-10-10 | End: 2023-10-10

## 2023-10-10 RX ORDER — METOPROLOL TARTRATE 25 MG/1
25 TABLET, FILM COATED ORAL ONCE
Status: COMPLETED | OUTPATIENT
Start: 2023-10-10 | End: 2023-10-10

## 2023-10-10 RX ORDER — METOPROLOL TARTRATE 1 MG/ML
5 INJECTION, SOLUTION INTRAVENOUS ONCE
Status: COMPLETED | OUTPATIENT
Start: 2023-10-10 | End: 2023-10-10

## 2023-10-10 RX ORDER — SODIUM CHLORIDE 9 MG/ML
1000 INJECTION, SOLUTION INTRAVENOUS CONTINUOUS
Status: DISCONTINUED | OUTPATIENT
Start: 2023-10-10 | End: 2023-10-12

## 2023-10-10 RX ORDER — DILTIAZEM HCL/D5W 125 MG/125
5-15 PLASTIC BAG, INJECTION (ML) INTRAVENOUS CONTINUOUS
Status: DISCONTINUED | OUTPATIENT
Start: 2023-10-10 | End: 2023-10-12

## 2023-10-10 RX ORDER — SODIUM CHLORIDE 9 MG/ML
INJECTION, SOLUTION INTRAVENOUS CONTINUOUS
Status: DISCONTINUED | OUTPATIENT
Start: 2023-10-10 | End: 2023-10-11

## 2023-10-10 RX ORDER — MAGNESIUM SULFATE HEPTAHYDRATE 500 MG/ML
2 INJECTION, SOLUTION INTRAMUSCULAR; INTRAVENOUS ONCE
Status: DISCONTINUED | OUTPATIENT
Start: 2023-10-10 | End: 2023-10-10

## 2023-10-10 RX ORDER — MAGNESIUM SULFATE HEPTAHYDRATE 40 MG/ML
2 INJECTION, SOLUTION INTRAVENOUS ONCE
Status: COMPLETED | OUTPATIENT
Start: 2023-10-10 | End: 2023-10-10

## 2023-10-10 RX ORDER — LISINOPRIL 10 MG/1
TABLET ORAL
Qty: 90 TABLET | Refills: 3 | Status: SHIPPED | OUTPATIENT
Start: 2023-10-10 | End: 2024-09-12

## 2023-10-10 RX ORDER — CEPHALEXIN 500 MG/1
500 CAPSULE ORAL 4 TIMES DAILY
COMMUNITY
Start: 2023-10-05 | End: 2023-10-15

## 2023-10-10 RX ORDER — MULTIVITAMIN,THERAPEUTIC
1 TABLET ORAL ONCE
Status: COMPLETED | OUTPATIENT
Start: 2023-10-10 | End: 2023-10-10

## 2023-10-10 RX ORDER — MAGNESIUM OXIDE 400 MG/1
800 TABLET ORAL ONCE
Status: COMPLETED | OUTPATIENT
Start: 2023-10-10 | End: 2023-10-10

## 2023-10-10 RX ADMIN — FOLIC ACID 1 MG: 1 TABLET ORAL at 16:46

## 2023-10-10 RX ADMIN — THIAMINE HCL TAB 100 MG 100 MG: 100 TAB at 16:46

## 2023-10-10 RX ADMIN — SODIUM CHLORIDE 1000 ML: 9 INJECTION, SOLUTION INTRAVENOUS at 21:46

## 2023-10-10 RX ADMIN — MAGNESIUM SULFATE HEPTAHYDRATE 2 G: 2 INJECTION, SOLUTION INTRAVENOUS at 16:59

## 2023-10-10 RX ADMIN — METOPROLOL TARTRATE 25 MG: 25 TABLET, FILM COATED ORAL at 16:46

## 2023-10-10 RX ADMIN — SODIUM CHLORIDE: 9 INJECTION, SOLUTION INTRAVENOUS at 21:12

## 2023-10-10 RX ADMIN — Medication 800 MG: at 16:46

## 2023-10-10 RX ADMIN — METOPROLOL TARTRATE 5 MG: 1 INJECTION, SOLUTION INTRAVENOUS at 17:54

## 2023-10-10 RX ADMIN — Medication 5 MG/HR: at 19:28

## 2023-10-10 RX ADMIN — SODIUM CHLORIDE 1000 ML: 9 INJECTION, SOLUTION INTRAVENOUS at 16:22

## 2023-10-10 RX ADMIN — METOPROLOL TARTRATE 50 MG: 50 TABLET, FILM COATED ORAL at 17:54

## 2023-10-10 RX ADMIN — MULTIVITAMIN TABLET 1 TABLET: TABLET at 16:44

## 2023-10-10 RX ADMIN — METOPROLOL TARTRATE 5 MG: 1 INJECTION, SOLUTION INTRAVENOUS at 16:47

## 2023-10-10 ASSESSMENT — ACTIVITIES OF DAILY LIVING (ADL)
ADLS_ACUITY_SCORE: 35
TOILETING_ISSUES: NO
DRESSING/BATHING_DIFFICULTY: NO
WALKING_OR_CLIMBING_STAIRS_DIFFICULTY: NO
CONCENTRATING,_REMEMBERING_OR_MAKING_DECISIONS_DIFFICULTY: NO
WEAR_GLASSES_OR_BLIND: NO
FALL_HISTORY_WITHIN_LAST_SIX_MONTHS: NO
ADLS_ACUITY_SCORE: 35
DOING_ERRANDS_INDEPENDENTLY_DIFFICULTY: NO
DIFFICULTY_EATING/SWALLOWING: NO
CHANGE_IN_FUNCTIONAL_STATUS_SINCE_ONSET_OF_CURRENT_ILLNESS/INJURY: NO

## 2023-10-10 ASSESSMENT — ENCOUNTER SYMPTOMS
SHORTNESS OF BREATH: 0
NAUSEA: 0
DIARRHEA: 0
FATIGUE: 1
FEVER: 0
VOMITING: 0
BLOOD IN STOOL: 0
COUGH: 0
DYSURIA: 0
ABDOMINAL PAIN: 0
HEADACHES: 0
CHILLS: 0
SINUS PRESSURE: 0
CONSTIPATION: 0
DIAPHORESIS: 0
SORE THROAT: 0
PALPITATIONS: 0
FREQUENCY: 0
WHEEZING: 0

## 2023-10-10 NOTE — ED TRIAGE NOTES
"Pt at clinic and was told to come to ER for \"fast heart rate.\"  Pt has no complaints.     Triage Assessment       Row Name 10/10/23 3454       Triage Assessment (Adult)    Airway WDL WDL       Respiratory WDL    Respiratory WDL WDL       Skin Circulation/Temperature WDL    Skin Circulation/Temperature WDL WDL       Cardiac WDL    Cardiac WDL WDL       Peripheral/Neurovascular WDL    Peripheral Neurovascular WDL WDL       Cognitive/Neuro/Behavioral WDL    Cognitive/Neuro/Behavioral WDL WDL                    "

## 2023-10-10 NOTE — PROGRESS NOTES
SUBJECTIVE:   CC: Tonny is an 62 year old who presents for preventative health visit.     History of Present Illness       Hypertension: He presents for follow up of hypertension.  He does not check blood pressure  regularly outside of the clinic. Outpatient blood pressures have not been over 140/90. He does not follow a low salt diet.     He eats 0-1 servings of fruits and vegetables daily.He consumes 3 sweetened beverage(s) daily.He exercises with enough effort to increase his heart rate 30 to 60 minutes per day.  He exercises with enough effort to increase his heart rate 7 days per week.   He is taking medications regularly.      Today's PHQ-2 Score:       10/10/2023     1:54 PM   PHQ-2 ( 1999 [employer])   Q1: Little interest or pleasure in doing things 0   Q2: Feeling down, depressed or hopeless 0   PHQ-2 Score 0   Q1: Little interest or pleasure in doing things Not at all   Q2: Feeling down, depressed or hopeless Not at all   PHQ-2 Score 0     Patient states that he has overall been feeling well.  He has a sebaceous cyst which was removed on October 5 at a different clinic.  He states that this was fairly painful but he feels as though it is healing up okay.    Otherwise, smoking about a pack per day.  Not necessarily interested in quitting at this time.    Heart rate noted to be fast in clinic.  Patient denies any chest pain or shortness of breath.  Denies any lightheadedness.  Does not feel as though his heart is beating quickly necessarily.      Social History     Tobacco Use    Smoking status: Every Day     Types: Cigarettes    Smokeless tobacco: Never   Substance Use Topics    Alcohol use: Yes     Comment: 6-8             1/19/2022    12:57 PM   Alcohol Use   Prescreen: >3 drinks/day or >7 drinks/week? No       Last PSA:   Prostate Specific Antigen Screen   Date Value Ref Range Status   05/22/2017 0.5 0.0 - 3.5 ng/mL Final       Reviewed orders with patient. Reviewed health maintenance and updated orders  "accordingly - Yes  Lab work is in process    Reviewed and updated as needed this visit by clinical staff    Allergies  Meds              Reviewed and updated as needed this visit by Provider                     Review of Systems  CONSTITUTIONAL: NEGATIVE for fever, chills, change in weight  INTEGUMENTARY/SKIN: NEGATIVE for worrisome rashes, moles or lesions  EYES: NEGATIVE for vision changes or irritation  ENT: NEGATIVE for ear, mouth and throat problems  RESP: NEGATIVE for significant cough or SOB  CV: NEGATIVE for chest pain, palpitations or peripheral edema  GI: NEGATIVE for nausea, abdominal pain, heartburn, or change in bowel habits   male: negative for dysuria, hematuria, decreased urinary stream, erectile dysfunction, urethral discharge  MUSCULOSKELETAL: NEGATIVE for significant arthralgias or myalgia  NEURO: NEGATIVE for weakness, dizziness or paresthesias  PSYCHIATRIC: NEGATIVE for changes in mood or affect    OBJECTIVE:   /60   Pulse 92   Temp 97.6  F (36.4  C) (Tympanic)   Resp 16   Ht 1.71 m (5' 7.32\")   Wt 82.7 kg (182 lb 4 oz)   SpO2 96%   BMI 28.27 kg/m      Physical Exam  Objective:  Vital signs reviewed and stable  General: No acute distress  Psych: Appropriate affect  HEENT: moist mucous membranes, pupils equal, round, reactive to light and accomodation, tympanic membranes are pearly grey bilaterally  Lymph: no cervical or supraclavicular lymphadenopathy  Cardiovascular: Tachycardic with a regular rhythm and no murmur  Pulmonary: clear to auscultation bilaterally with no wheeze  Abdomen: soft, non tender, non distended with normo-active bowel sounds  Extremities: warm and well perfused with no edema  Skin: warm and dry with no rash      Diagnostic Test Results:  Labs reviewed in Epic    ASSESSMENT/PLAN:   1. Routine general medical examination at a health care facility  Due to patient's newly diagnosed SVT we deferred immunizations today.  He can come back for a immunization only " nurse appointment once he is more stable.    - Pneumococcal 20 Valent Conjugate (Prevnar 20)  - ZOSTER RECOMBINANT ADJUVANTED (SHINGRIX)  - TDAP 10-64Y (ADACEL,BOOSTRIX)  - REVIEW OF HEALTH MAINTENANCE PROTOCOL ORDERS  - PRIMARY CARE FOLLOW-UP SCHEDULING; Future  - CBC with Platelets; Future  - Lipid panel reflex to direct LDL Non-fasting; Future  - HEMOGLOBIN A1C; Future    2. Primary hypertension  Blood pressure is on the low side today.  I suspect that this is due to his tachycardia.  He did take his lisinopril today already.  - lisinopril (ZESTRIL) 10 MG tablet; TAKE 1 TABLET (10 MG) BY MOUTH DAILY  Dispense: 90 tablet; Refill: 3  - COMPREHENSIVE METABOLIC PANEL; Future    3. Screening for prostate cancer    - PROSTATE SPEC ANTIGEN SCREEN; Future    4. Tachycardia  EKG done in clinic today.  Supraventricular tachycardia with a rate of 152 bpm.  No P waves noted.  Heart rate does not decrease with Valsalva.    Of note, when patient was in clinic on October 5 for his cyst removal his pulse was 140 but this was not investigated further at that time.    Unclear how long he has been in SVT for although this does appear quite sustained today.    Patient was sent to the emergency department.  His wife will come and pick him up.  Called and spoke with the emergency department provider with a signout.  - EKG 12-lead complete w/read - Clinics  - TSH with free T4 reflex; Future    5. SVT (supraventricular tachycardia)  Patient is asymptomatic.  See above  - Adult Cardiology Eval  Referral; Future      Patient has been advised of split billing requirements and indicates understanding: Yes      COUNSELING:   Reviewed preventive health counseling, as reflected in patient instructions        He reports that he has been smoking cigarettes. He has never used smokeless tobacco.  Nicotine/Tobacco Cessation Plan:   Information offered: Patient not interested at this time            Jenniffer Matt MD  [facility]  [facility]   Normal for race

## 2023-10-10 NOTE — ED PROVIDER NOTES
History     Chief Complaint   Patient presents with     Tachycardia     HPI  Lan Manuel is a 62 year old male who presents with history of tobacco abuse heavy alcohol use.  Referred from clinic for atrial fibrillation with rapid ventricular rate.  Unsure when this started.  No associated chest pain shortness of breath palpitations.  They have been feeling more fatigued recently.  No known thyroid disease.  No known history of any VTE risk.  Drinks 8-12 beers every night.  He has not gone through withdrawal that he is knows of.  He has been hospitalized previously and has not gone through withdrawal.  He has also history of hypertension and prior diverticulitis      Allergies:  No Known Allergies    Problem List:    Patient Active Problem List    Diagnosis Date Noted     Ganglion 10/09/2023     Priority: Medium     Formatting of this note might be different from the original. Created by Conversion Replacement Utility updated for latest IMO load       Adenomatous colon polyp 03/16/2021     Priority: Medium     Nicotine dependence 01/14/2021     Priority: Medium     Created by Conversion       Diverticulitis 01/14/2021     Priority: Medium     Diverticulitis of colon 01/14/2021     Priority: Medium     Appendicular diverticulum 01/14/2021     Priority: Medium     Alcohol use 01/14/2021     Priority: Medium     6-8 beers/day       Multiple rib fractures 01/19/2017     Priority: Medium     Left scapula fracture 01/19/2017     Priority: Medium     Hypertension 08/09/2015     Priority: Medium        Past Medical History:    Past Medical History:   Diagnosis Date     Hypertension 8/9/2015       Past Surgical History:    Past Surgical History:   Procedure Laterality Date     COLONOSCOPY N/A 3/2/2021    Procedure: COLONOSCOPY, WITH POLYPECTOMY AND BIOPSY;  Surgeon: Francis Chow MD;  Location: [facility]     IR LUMBAR EPIDURAL STEROID INJECTION  1/30/2007       Family History:    Family History   Problem Relation Age  "of Onset     Myocardial Infarction Mother 37         of MI at 37     No Known Problems Father      Heart Disease Mother 37.00     Allergies Father        Social History:  Marital Status:   [2]  Social History     Tobacco Use     Smoking status: Every Day     Types: Cigarettes     Smokeless tobacco: Never   Substance Use Topics     Alcohol use: Yes     Comment: 6-8     Drug use: Not Currently        Medications:    calcium carbonate (OS-EROS) 600 MG tablet  cephALEXin (KEFLEX) 500 MG capsule  cholecalciferol (VITAMIN D3) 10 mcg (400 units) TABS tablet  lisinopril (ZESTRIL) 10 MG tablet  Multiple Vitamins-Minerals (MULTIVITAMIN ADULTS 50+ PO)  Omega-3 Fatty Acids (FISH OIL) 1200 MG capsule          Review of Systems   Constitutional:  Positive for fatigue. Negative for chills, diaphoresis and fever.   HENT:  Negative for ear pain, sinus pressure and sore throat.    Eyes:  Negative for visual disturbance.   Respiratory:  Negative for cough, shortness of breath and wheezing.    Cardiovascular:  Negative for chest pain and palpitations.   Gastrointestinal:  Negative for abdominal pain, blood in stool, constipation, diarrhea, nausea and vomiting.   Genitourinary:  Negative for dysuria, frequency and urgency.   Skin:  Negative for rash.   Neurological:  Negative for headaches.   All other systems reviewed and are negative.      Physical Exam   BP: (!) 133/99  Pulse: (!) 152  Temp: 98.2  F (36.8  C)  Height: 172.7 cm (5' 8\")  Weight: 81.6 kg (180 lb)  SpO2: 96 %      Physical Exam  Constitutional:       General: He is in acute distress.      Appearance: He is not diaphoretic.   HENT:      Head: Atraumatic.   Eyes:      Conjunctiva/sclera: Conjunctivae normal.   Cardiovascular:      Rate and Rhythm: Tachycardia present. Rhythm irregular.      Heart sounds: No murmur heard.  Pulmonary:      Effort: No respiratory distress.      Breath sounds: No stridor. No wheezing or rhonchi.   Abdominal:      General: Abdomen is " flat. There is no distension.      Palpations: Abdomen is soft. There is no mass.      Tenderness: There is no abdominal tenderness. There is no guarding.   Musculoskeletal:      Right lower leg: No edema.      Left lower leg: No edema.   Skin:     Coloration: Skin is not pale.      Findings: No rash.   Neurological:      General: No focal deficit present.      Mental Status: He is alert.      Motor: No weakness.         ED Course                 EKG Interpretation:      Interpreted by Frederick Jeffery MD  EKG at 1617 hrs. demonstrates a atrial flutter at 111 bpm and average of complete flutter that varies between 2-1 conduction and 4-1 conduction, there is no ST change other than a possible strain pattern laterally V4 through V6.  No T wave changes.  Other than T wave flattening.  There is a normal R progression.  No Q waves.  Normal intervals.  Normal conduction.  No ectopy.  Impression atrial flutter 111 bpm varying between 2-1 and 4-1 conduction.  Strain pattern laterally no other change.  In 2021 he was in sinus rhythm.           EKG Interpretation:      Interpreted by Frederick Jeffery MD  EKG done at 2114 hrs. demonstrates a atrial flutter at 83 bpm with a normal axis.  There is no ST change.  T wave inversion V2 V3 V4 V5.  There is a normal R progression.  No Q waves.  Normal intervals.  Normal conduction.  No ectopy.  Impression atrial flutter at 83 bpm with markedly improved rate on diltiazem.         Procedures             Critical Care time:  none               Results for orders placed or performed during the hospital encounter of 10/10/23 (from the past 24 hour(s))   [place] Draw *Canceled*    Narrative    The following orders were created for panel order [place] Draw.  Procedure                               Abnormality         Status                     ---------                               -----------         ------                       Please view results for these tests on the individual orders.    CBC with Platelets & Differential    Narrative    The following orders were created for panel order CBC with Platelets & Differential.  Procedure                               Abnormality         Status                     ---------                               -----------         ------                     CBC with platelets and d...[[id number]]  Abnormal            Final result                 Please view results for these tests on the individual orders.   Comprehensive metabolic panel   Result Value Ref Range    Sodium 140 135 - 145 mmol/L    Potassium 4.7 3.4 - 5.3 mmol/L    Carbon Dioxide (CO2) 28 22 - 29 mmol/L    Anion Gap 9 7 - 15 mmol/L    Urea Nitrogen 18.2 8.0 - 23.0 mg/dL    Creatinine 0.97 0.67 - 1.17 mg/dL    GFR Estimate 89 >60 mL/min/1.73m2    Calcium 10.1 8.8 - 10.2 mg/dL    Chloride 103 98 - 107 mmol/L    Glucose 108 (H) 70 - 99 mg/dL    Alkaline Phosphatase 77 40 - 129 U/L    AST 33 0 - 45 U/L    ALT 33 0 - 70 U/L    Protein Total 7.0 6.4 - 8.3 g/dL    Albumin 4.5 3.5 - 5.2 g/dL    Bilirubin Total 1.1 <=1.2 mg/dL   [place] Draw *Canceled*    Narrative    The following orders were created for panel order [place] Draw.  Procedure                               Abnormality         Status                     ---------                               -----------         ------                     Extra Blue Top Tube[[id number]]                                                           Please view results for these tests on the individual orders.   INR   Result Value Ref Range    INR 1.02 0.85 - 1.15   PTT   Result Value Ref Range    aPTT 27 22 - 38 Seconds   TSH with free T4 reflex   Result Value Ref Range    TSH 2.49 0.30 - 4.20 uIU/mL   CBC with platelets and differential   Result Value Ref Range    WBC Count 6.7 4.0 - 11.0 10e3/uL    RBC Count 4.50 4.40 - 5.90 10e6/uL    Hemoglobin 15.4 13.3 - 17.7 g/dL    Hematocrit 45.7 40.0 - 53.0 %     (H) 78 - 100 fL    MCH 34.2 (H) 26.5 - 33.0 pg    MCHC  33.7 31.5 - 36.5 g/dL    RDW 12.7 10.0 - 15.0 %    Platelet Count 193 150 - 450 10e3/uL    % Neutrophils 54 %    % Lymphocytes 33 %    % Monocytes 11 %    Mids % (Monos, Eos, Basos)      % Eosinophils 1 %    % Basophils 1 %    % Immature Granulocytes 0 %    NRBCs per 100 WBC 0 <1 /100    Absolute Neutrophils 3.7 1.6 - 8.3 10e3/uL    Absolute Lymphocytes 2.2 0.8 - 5.3 10e3/uL    Absolute Monocytes 0.7 0.0 - 1.3 10e3/uL    Mids Abs (Monos, Eos, Basos)      Absolute Eosinophils 0.0 0.0 - 0.7 10e3/uL    Absolute Basophils 0.0 0.0 - 0.2 10e3/uL    Absolute Immature Granulocytes 0.0 <=0.4 10e3/uL    Absolute NRBCs 0.0 10e3/uL   Magnesium   Result Value Ref Range    Magnesium 2.0 1.7 - 2.3 mg/dL   Phosphorus   Result Value Ref Range    Phosphorus 4.6 (H) 2.5 - 4.5 mg/dL   Chest XR,  PA & LAT    Narrative    EXAM: XR CHEST 2 VIEWS  LOCATION: [facility]  DATE: 10/10/2023    INDICATION: A fib RVR.  COMPARISON: None.      Impression    IMPRESSION: On the lateral view, overlying the mid thoracic spine, there is an ovoid 2.4 cm density. This most likely represents a prominent thoracic osteophyte. However, unenhanced chest CT is recommended to exclude pulmonary nodule. Several old   left-sided rib fractures. No pulmonary infiltrates. Heart size upper limits of normal.       Medications   sodium chloride 0.9% BOLUS 1,000 mL (1,000 mLs Intravenous $New Bag 10/10/23 3806)   metoprolol tartrate (LOPRESSOR) tablet 25 mg (has no administration in time range)   metoprolol (LOPRESSOR) injection 5 mg (has no administration in time range)   multivitamin, therapeutic (THERA-VIT) tablet 1 tablet (has no administration in time range)   folic acid (FOLVITE) tablet 1 mg (has no administration in time range)   thiamine (B-1) tablet 100 mg (has no administration in time range)   magnesium oxide (MAG-OX) tablet 800 mg (has no administration in time range)   magnesium sulfate injection 2 g (has no administration in time  range)       Assessments & Plan (with Medical Decision Making)     MDM: Lan Manuel is a 62 year old male presents asymptomatically from clinic with atrial flutter rapid ventricular rate with a lateral strain pattern.  He varies between 2-1 oh 4-1 conduction.  He uses very heavy alcohol 8-12 beers every day.  Is unaware of going through withdrawal but has not had much breaks other than some hospitalizations in the past.  Denies associated cardiopulmonary symptoms heart rate in the 150s blood pressure 130s.  Evaluate with also thyroid electrolytes blood count magnesium given rally pack given.  Discussed starting with metoprolol but if this fails then shifting to his diltiazem.  Currently he is stable and with unknown start time will not cardiovert.  Appears that this has been present for a prolonged period of time    Patient was initially started on metoprolol.  I given 2 separate doses of 5 mg IV each followed by first 25 mg oral metoprolol and then to 50 mg oral metoprolol but his heart rate did not budge from 140s.  I therefore initiated diltiazem infusion and the patient was set up for hospital stay in the ICU.  I spoke to Magi who accepted the patient for admission.  We continue to observe him longer because he did end up swallowing his right on the diltiazem but he remained in atrial flutter and systolic blood pressures had dropped.    I recommended that he remain in the hospital.  Consider echocardiogram while still hospitalized or on follow-up.      I have reviewed the nursing notes.    I have reviewed the findings, diagnosis, plan and need for follow up with the patient.          ED to Inpatient Handoff:    Discussed with Magi   Patient accepted for Observation Stay  Pending studies include none  Code Status: Not Addressed             Medical Decision Making  The patient's presentation was of moderate complexity (an acute illness with systemic symptoms).    The patient's evaluation involved:  ordering  and/or review of 3+ test(s) in this encounter (see separate area of note for details)    The patient's management necessitated high risk (a decision regarding hospitalization).        New Prescriptions    No medications on file       Final diagnoses:   Atrial fibrillation with RVR (H)       10/10/2023   [facility] EMERGENCY DEPT       Frederick Jeffery MD  10/11/23 0109

## 2023-10-11 ENCOUNTER — APPOINTMENT (OUTPATIENT)
Dept: CARDIOLOGY | Facility: CLINIC | Age: 63
End: 2023-10-11
Payer: COMMERCIAL

## 2023-10-11 LAB
ALBUMIN SERPL BCG-MCNC: 3.8 G/DL (ref 3.5–5.2)
ALP SERPL-CCNC: 67 U/L (ref 40–129)
ALT SERPL W P-5'-P-CCNC: 42 U/L (ref 0–70)
AMPHETAMINES UR QL SCN: ABNORMAL
ANION GAP SERPL CALCULATED.3IONS-SCNC: 9 MMOL/L (ref 7–15)
AST SERPL W P-5'-P-CCNC: 38 U/L (ref 0–45)
BARBITURATES UR QL SCN: ABNORMAL
BENZODIAZ UR QL SCN: ABNORMAL
BILIRUB SERPL-MCNC: 1.2 MG/DL
BUN SERPL-MCNC: 15.7 MG/DL (ref 8–23)
BZE UR QL SCN: ABNORMAL
CALCIUM SERPL-MCNC: 9 MG/DL (ref 8.8–10.2)
CANNABINOIDS UR QL SCN: ABNORMAL
CHLORIDE SERPL-SCNC: 107 MMOL/L (ref 98–107)
CREAT SERPL-MCNC: 0.9 MG/DL (ref 0.67–1.17)
DEPRECATED HCO3 PLAS-SCNC: 22 MMOL/L (ref 22–29)
EGFRCR SERPLBLD CKD-EPI 2021: >90 ML/MIN/1.73M2
ERYTHROCYTE [DISTWIDTH] IN BLOOD BY AUTOMATED COUNT: 13.1 % (ref 10–15)
FENTANYL UR QL: ABNORMAL
GLUCOSE SERPL-MCNC: 121 MG/DL (ref 70–99)
HCT VFR BLD AUTO: 42.6 % (ref 40–53)
HGB BLD-MCNC: 14.3 G/DL (ref 13.3–17.7)
LVEF ECHO: NORMAL
MCH RBC QN AUTO: 34.1 PG (ref 26.5–33)
MCHC RBC AUTO-ENTMCNC: 33.6 G/DL (ref 31.5–36.5)
MCV RBC AUTO: 102 FL (ref 78–100)
OPIATES UR QL SCN: ABNORMAL
PCP QUAL URINE (ROCHE): ABNORMAL
PHOSPHATE SERPL-MCNC: 4.6 MG/DL (ref 2.5–4.5)
PLATELET # BLD AUTO: 153 10E3/UL (ref 150–450)
POTASSIUM SERPL-SCNC: 4.3 MMOL/L (ref 3.4–5.3)
PROT SERPL-MCNC: 5.9 G/DL (ref 6.4–8.3)
RBC # BLD AUTO: 4.19 10E6/UL (ref 4.4–5.9)
SODIUM SERPL-SCNC: 138 MMOL/L (ref 135–145)
WBC # BLD AUTO: 6 10E3/UL (ref 4–11)

## 2023-10-11 PROCEDURE — 36415 COLL VENOUS BLD VENIPUNCTURE: CPT | Performed by: STUDENT IN AN ORGANIZED HEALTH CARE EDUCATION/TRAINING PROGRAM

## 2023-10-11 PROCEDURE — 80053 COMPREHEN METABOLIC PANEL: CPT | Performed by: STUDENT IN AN ORGANIZED HEALTH CARE EDUCATION/TRAINING PROGRAM

## 2023-10-11 PROCEDURE — 250N000011 HC RX IP 250 OP 636

## 2023-10-11 PROCEDURE — 250N000013 HC RX MED GY IP 250 OP 250 PS 637

## 2023-10-11 PROCEDURE — 258N000003 HC RX IP 258 OP 636

## 2023-10-11 PROCEDURE — 83880 ASSAY OF NATRIURETIC PEPTIDE: CPT | Performed by: INTERNAL MEDICINE

## 2023-10-11 PROCEDURE — 93306 TTE W/DOPPLER COMPLETE: CPT | Mod: 26 | Performed by: INTERNAL MEDICINE

## 2023-10-11 PROCEDURE — 85027 COMPLETE CBC AUTOMATED: CPT | Performed by: STUDENT IN AN ORGANIZED HEALTH CARE EDUCATION/TRAINING PROGRAM

## 2023-10-11 PROCEDURE — 999N000208 ECHOCARDIOGRAM COMPLETE

## 2023-10-11 PROCEDURE — 200N000001 HC R&B ICU

## 2023-10-11 PROCEDURE — HZ2ZZZZ DETOXIFICATION SERVICES FOR SUBSTANCE ABUSE TREATMENT: ICD-10-PCS

## 2023-10-11 PROCEDURE — 99222 1ST HOSP IP/OBS MODERATE 55: CPT

## 2023-10-11 PROCEDURE — 99207 PR NO BILLABLE SERVICE THIS VISIT: CPT | Performed by: STUDENT IN AN ORGANIZED HEALTH CARE EDUCATION/TRAINING PROGRAM

## 2023-10-11 PROCEDURE — 99233 SBSQ HOSP IP/OBS HIGH 50: CPT | Performed by: INTERNAL MEDICINE

## 2023-10-11 PROCEDURE — 80307 DRUG TEST PRSMV CHEM ANLYZR: CPT

## 2023-10-11 PROCEDURE — 255N000002 HC RX 255 OP 636: Performed by: INTERNAL MEDICINE

## 2023-10-11 RX ORDER — DILTIAZEM HYDROCHLORIDE 30 MG/1
30 TABLET, FILM COATED ORAL EVERY 6 HOURS PRN
Status: DISCONTINUED | OUTPATIENT
Start: 2023-10-11 | End: 2023-10-12 | Stop reason: HOSPADM

## 2023-10-11 RX ORDER — MULTIPLE VITAMINS W/ MINERALS TAB 9MG-400MCG
1 TAB ORAL DAILY
Status: DISCONTINUED | OUTPATIENT
Start: 2023-10-11 | End: 2023-10-12 | Stop reason: HOSPADM

## 2023-10-11 RX ORDER — PROCHLORPERAZINE 25 MG
25 SUPPOSITORY, RECTAL RECTAL EVERY 12 HOURS PRN
Status: DISCONTINUED | OUTPATIENT
Start: 2023-10-11 | End: 2023-10-12 | Stop reason: HOSPADM

## 2023-10-11 RX ORDER — FLUMAZENIL 0.1 MG/ML
0.2 INJECTION, SOLUTION INTRAVENOUS
Status: DISCONTINUED | OUTPATIENT
Start: 2023-10-11 | End: 2023-10-12 | Stop reason: HOSPADM

## 2023-10-11 RX ORDER — OLANZAPINE 5 MG/1
5-10 TABLET, ORALLY DISINTEGRATING ORAL EVERY 6 HOURS PRN
Status: DISCONTINUED | OUTPATIENT
Start: 2023-10-11 | End: 2023-10-12 | Stop reason: HOSPADM

## 2023-10-11 RX ORDER — PROCHLORPERAZINE MALEATE 10 MG
10 TABLET ORAL EVERY 6 HOURS PRN
Status: DISCONTINUED | OUTPATIENT
Start: 2023-10-11 | End: 2023-10-12 | Stop reason: HOSPADM

## 2023-10-11 RX ORDER — HALOPERIDOL 5 MG/ML
2.5-5 INJECTION INTRAMUSCULAR EVERY 6 HOURS PRN
Status: DISCONTINUED | OUTPATIENT
Start: 2023-10-11 | End: 2023-10-12 | Stop reason: HOSPADM

## 2023-10-11 RX ORDER — CLONIDINE HYDROCHLORIDE 0.1 MG/1
0.1 TABLET ORAL EVERY 8 HOURS
Status: DISCONTINUED | OUTPATIENT
Start: 2023-10-11 | End: 2023-10-11

## 2023-10-11 RX ORDER — FOLIC ACID 1 MG/1
1 TABLET ORAL DAILY
Status: DISCONTINUED | OUTPATIENT
Start: 2023-10-11 | End: 2023-10-12 | Stop reason: HOSPADM

## 2023-10-11 RX ORDER — DIAZEPAM 10 MG/2ML
5-10 INJECTION, SOLUTION INTRAMUSCULAR; INTRAVENOUS EVERY 30 MIN PRN
Status: DISCONTINUED | OUTPATIENT
Start: 2023-10-11 | End: 2023-10-12 | Stop reason: HOSPADM

## 2023-10-11 RX ORDER — DIAZEPAM 5 MG
10 TABLET ORAL EVERY 30 MIN PRN
Status: DISCONTINUED | OUTPATIENT
Start: 2023-10-11 | End: 2023-10-12 | Stop reason: HOSPADM

## 2023-10-11 RX ORDER — DILTIAZEM HYDROCHLORIDE 30 MG/1
30 TABLET, FILM COATED ORAL EVERY 6 HOURS SCHEDULED
Status: DISCONTINUED | OUTPATIENT
Start: 2023-10-11 | End: 2023-10-11

## 2023-10-11 RX ORDER — METOPROLOL TARTRATE 25 MG/1
50 TABLET, FILM COATED ORAL 2 TIMES DAILY
Status: DISCONTINUED | OUTPATIENT
Start: 2023-10-11 | End: 2023-10-11

## 2023-10-11 RX ADMIN — Medication 10 MG/HR: at 15:10

## 2023-10-11 RX ADMIN — MULTIPLE VITAMINS W/ MINERALS TAB 1 TABLET: TAB at 08:01

## 2023-10-11 RX ADMIN — DILTIAZEM HYDROCHLORIDE 30 MG: 30 TABLET, FILM COATED ORAL at 00:30

## 2023-10-11 RX ADMIN — DILTIAZEM HYDROCHLORIDE 30 MG: 30 TABLET, FILM COATED ORAL at 05:54

## 2023-10-11 RX ADMIN — THIAMINE HCL TAB 100 MG 100 MG: 100 TAB at 08:01

## 2023-10-11 RX ADMIN — DILTIAZEM HYDROCHLORIDE 30 MG: 30 TABLET, FILM COATED ORAL at 12:31

## 2023-10-11 RX ADMIN — HUMAN ALBUMIN MICROSPHERES AND PERFLUTREN 2 ML: 10; .22 INJECTION, SOLUTION INTRAVENOUS at 11:26

## 2023-10-11 RX ADMIN — SODIUM CHLORIDE 1000 ML: 9 INJECTION, SOLUTION INTRAVENOUS at 05:49

## 2023-10-11 RX ADMIN — FOLIC ACID 1 MG: 1 TABLET ORAL at 08:01

## 2023-10-11 RX ADMIN — SODIUM CHLORIDE 1000 ML: 9 INJECTION, SOLUTION INTRAVENOUS at 13:38

## 2023-10-11 ASSESSMENT — ACTIVITIES OF DAILY LIVING (ADL)
ADLS_ACUITY_SCORE: 18

## 2023-10-11 NOTE — PLAN OF CARE
Goal Outcome Evaluation:    Pt is resting comfortably this AM. Cardizem remains at 10mg/hr w/HR's 80s-100s. BP stable. A&O, no signs of withdrawal at this time. Using urinal at the bedside. Denies pain.   Salina Kumari RN on 10/11/2023 at 6:54 AM

## 2023-10-11 NOTE — ED NOTES
SBP 85. MD notified, verbal order to stop diltiazem. Verbal order for NS bolus. Pt denies any sx.

## 2023-10-11 NOTE — MEDICATION SCRIBE - ADMISSION MEDICATION HISTORY
Medication Scribe Admission Medication History    Admission medication history is complete. The information provided in this note is only as accurate as the sources available at the time of the update.    Information Source(s): Patient via in-person    Pertinent Information: None    Changes made to PTA medication list:  Added: None  Deleted: None  Changed: None    Medication Affordability:  Not including over the counter (OTC) medications, was there a time in the past 3 months when you did not take your medications as prescribed because of cost?: No    Allergies reviewed with patient and updates made in EHR: yes    Medication History Completed By: Yocasta Ross 10/11/2023 10:05 AM    PTA Med List   Medication Sig Last Dose    calcium carbonate (OS-EROS) 600 MG tablet Take 600 mg by mouth daily 10/10/2023 at am    cephALEXin (KEFLEX) 500 MG capsule Take 500 mg by mouth 4 times daily 10/10/2023 at 1200    cholecalciferol (VITAMIN D3) 10 mcg (400 units) TABS tablet Take 1,000 Units by mouth daily 10/10/2023 at am    lisinopril (ZESTRIL) 10 MG tablet TAKE 1 TABLET (10 MG) BY MOUTH DAILY (Patient taking differently: Take 10 mg by mouth daily) 10/10/2023 at am    Multiple Vitamins-Minerals (MULTIVITAMIN ADULTS 50+ PO) Take 1 tablet by mouth daily 10/10/2023 at am    Omega-3 Fatty Acids (FISH OIL) 1200 MG capsule Take 1,200 mg by mouth daily 10/10/2023 at am

## 2023-10-11 NOTE — PROGRESS NOTES
Patient alert,  HR dropped down to 70's this am and patient  given cardizem 30mg orallyx 2 doses today. Cardizem gtt turned off at 0830 am. HR started to climb again to >100' eventually needed to restart cardizem GTT at 10mg/hr. Patient remains in A-fib/A-flutter with elevated rate. But now dropped to 90's. Cardiac ECHO completed, MD present and spoke to both patient and wife.both agree with plan of care and patient will be seen by cards tomorrow. Patient continues to deny S O A or C/P. We are also watching for S+S of alcohol W /D -CIWA score of basically -1- throughout the shift. Patient offers no c/o at this time.

## 2023-10-11 NOTE — PROGRESS NOTES
[facility] Medicine Progress Note  Date of Service (when I saw the patient): 10/11/2023    REASON FOR ADMISSION / INTERVAL HISTORY:  Patient was at follow up in clinic for hypertension and was noticed to have significant tachycardia. Appeared to be irregular rhythm so was sent to ER 10/10 for further evaluation.     ECHO 10/11-Left ventricular systolic function is severely reduced.The visual ejection  fraction is 20-25%.There is severe global hypokinesia of the left ventricle  with some regionality with basal inferolatreal wall mars the best.  Moderately decreased right ventricular systolic function  Moderate bi-atrial enlargement.  There is mild (1+) tricuspid regurgitation.  IVC diameter >2.1 cm collapsing <50% with sniff suggests a high RA pressure  estimated at 15 mmHg or greater.  The rhythm was atrial fibrillation.    Assessment/ Plan     Lan Manuel is a 62 year old male who presents on 10/10/2023 with tachycardia from clinic. Found to be in atrial flutter and is being admitted for management.      Atrial flutter with RVR   New onset, unclear duration PTA. Asymptomatic. Rates 140-150's in ER. Received multiple doses metoprolol IV without rate control. Eventually initiated on diltiazem drip, which controlled rate with slight initial dip in BP (asymptomatic, dropped to 80's/50's briefly with initiation of max dose dilt, then recovered). Titrated off dilt drip in 3 hrs, rate & BP remain stable.   [id number] score is 1 (HTN) so no anticoagulation started  at time of admission. TSH nl. Drug screen positive for cannabis  Dilt gtt was resumed at 3AM today 10/11. HR since was in 70s hence dilt gtt stopped later and pt started on po dilt-HR  now back in 140s, /60  ECHO showed EF 20-25% with severe global hypokinesis--see details above  -will resume dilt gtt, keep po dilt prn for HR >110, likely needs DANA and cardioversion/ anticoagulation. Will have cards  "consult in AM.      Hypertension  Normotensive since presentation, with short episode of hypotension after fast up-titration of diltiazem infusion in ER. Recovered quickly without symptoms upon weaning off max-dose diltiazem infusion. Managed PTA with lisinopril.   -continue to Hold PTA lisinopril until BP assured to be stable with new diltiazem (above)     Density of unclear significance on CXR  Chest Xray shows ovoid 2.4cm density overlying mid thoracic spine. Most likely a prominent thoracic osteophyte, however un-enhanced chest CT is recommended by radiology to exclude pulmonary nodule.   -Chest CT order placed in discharge navigator for completion & ongoing follow up with PCP for further workup     Nicotine dependence  Smokes 1PPD. Declines nicotine patch.      Alcohol use  Drinks 12 pack of beer everyday. Denies history of withdrawal. No current withdrawal sxs at time of admission.   -continue CIWA with diazepam & oral vitamins. If he starts withdrawing, would start neurontin protocol.           Diet: Regular Diet Adult    DVT Prophylaxis: Pneumatic Compression Devices  Hernandez Catheter: Not present  Code Status: Full Code        GLENN FARR MD   Pg [phone]        ROS:  As described in A/P and Exam.  Otherwise ALL are  negative.    PHYSICAL EXAM:  All vitals have been reviewed    Blood pressure 119/80, pulse 75, temperature 98  F (36.7  C), temperature source Oral, resp. rate 22, height 1.727 m (5' 8\"), weight 82.7 kg (182 lb 5.1 oz), SpO2 96%.    No intake/output data recorded.    GENERAL APPEARANCE: healthy, alert and no distress  EYES: conjunctiva clear, eyes grossly normal  HENT: external ears and nose normal   RESP: lungs clear to auscultation - no rales, rhonchi or wheezes  CV: regular rate and rhythm, normal S1 S2, no S3 or S4 and no murmur, click or rub   ABDOMEN: soft, nontender, no HSM or masses and bowel sounds normal  MS: no clubbing, cyanosis; no edema  SKIN: clear without significant rashes or " lesions  NEURO: -non-focal moves all 4 extr    ROUTINE  LABS (Last four results)  CMP  Recent Labs   Lab 10/11/23  0543 10/10/23  1620    140   POTASSIUM 4.3 4.7   CHLORIDE 107 103   CO2 22 28   ANIONGAP 9 9   * 108*   BUN 15.7 18.2   CR 0.90 0.97   GFRESTIMATED >90 89   EROS 9.0 10.1   MAG  --  2.0   PHOS  --  4.6*   PROTTOTAL 5.9* 7.0   ALBUMIN 3.8 4.5   BILITOTAL 1.2 1.1   ALKPHOS 67 77   AST 38 33   ALT 42 33     CBC  Recent Labs   Lab 10/11/23  0543 10/10/23  1620   WBC 6.0 6.7   RBC 4.19* 4.50   HGB 14.3 15.4   HCT 42.6 45.7   * 102*   MCH 34.1* 34.2*   MCHC 33.6 33.7   RDW 13.1 12.7    193     INR  Recent Labs   Lab 10/10/23  1620   INR 1.02     Arterial Blood GasNo lab results found in last 7 days.    Recent Results (from the past 24 hour(s))   Chest XR,  PA & LAT    Narrative    EXAM: XR CHEST 2 VIEWS  LOCATION: [facility]  DATE: 10/10/2023    INDICATION: A fib RVR.  COMPARISON: None.      Impression    IMPRESSION: On the lateral view, overlying the mid thoracic spine, there is an ovoid 2.4 cm density. This most likely represents a prominent thoracic osteophyte. However, unenhanced chest CT is recommended to exclude pulmonary nodule. Several old   left-sided rib fractures. No pulmonary infiltrates. Heart size upper limits of normal.   Echocardiogram Complete   Result Value    LVEF  20-25%    Narrative    [id number]  [id number]  [id number]  [id number]^CLAUDIA^ELVIRA^DUSTIN     [facility]  Echocardiography Laboratory  [address] [place] [address]     Name: AUDREY SHI  MRN: [id number]  : 1960  Study Date: 10/11/2023 10:51 AM  Age: 62 yrs  Gender: Male  Patient Location: [facility]  Reason For Study: Atrial Fibrillation  Ordering Physician: ELVIRA TAYLOR  Referring Physician: Jenniffer Matt  Performed By: Agnes Carballo RDCS     BSA: 2.0 m2  Height: 68 in  Weight: 182 lb  HR: 69  BP: 108/87  mmHg  ______________________________________________________________________________  Procedure  Complete Portable Echo Adult. Optison (NDC #[id number]) given intravenously.  ______________________________________________________________________________  Interpretation Summary     Left ventricular systolic function is severely reduced.The visual ejection  fraction is 20-25%.There is severe global hypokinesia of the left ventricle  with some regionality with basal inferolatreal wall mars the best.  Moderately decreased right ventricular systolic function  Moderate bi-atrial enlargement.  There is mild (1+) tricuspid regurgitation.  IVC diameter >2.1 cm collapsing <50% with sniff suggests a high RA pressure  estimated at 15 mmHg or greater.  The rhythm was atrial fibrillation.  ______________________________________________________________________________  Left Ventricle  The left ventricle is normal in size. There is normal left ventricular wall  thickness. Diastolic Doppler findings (E/E' ratio and/or other parameters)  suggest left ventricular filling pressures are indeterminate. Left ventricular  systolic function is severely reduced. The visual ejection fraction is 20-25%.  There is severe global hypokinesia of the left ventricle. with some  regionality with basal inferolatreal wall mars the best.     Right Ventricle  The right ventricle is mildly dilated. Moderately decreased right ventricular  systolic function.     Atria  The left atrium is moderately dilated. The right atrium is moderately dilated.  There is no color Doppler evidence of an atrial shunt.     Mitral Valve  There is trace to mild mitral regurgitation.     Tricuspid Valve  There is mild (1+) tricuspid regurgitation. The right ventricular systolic  pressure is approximated at 17.9 mmHg plus the right atrial pressure.     Aortic Valve  There is mild trileaflet aortic sclerosis. There is trace aortic  regurgitation. No aortic stenosis  is present.     Pulmonic Valve  The pulmonic valve is not well visualized. There is no pulmonic valvular  stenosis.     Vessels  The aortic root is normal size. Normal size ascending aorta. IVC diameter >2.1  cm collapsing <50% with sniff suggests a high RA pressure estimated at 15 mmHg  or greater.     Pericardium  There is no pericardial effusion.     Rhythm  The rhythm was atrial fibrillation.  ______________________________________________________________________________  MMode/2D Measurements & Calculations  IVSd: 0.99 cm     LVIDd: 4.9 cm  LVIDs: 4.4 cm  LVPWd: 0.97 cm  FS: 10.3 %  LV mass(C)d: 172.8 grams  LV mass(C)dI: 88.0 grams/m2  Ao root diam: 3.0 cm  LA dimension: 4.8 cm  asc Aorta Diam: 3.3 cm  LA/Ao: 1.6  Ao root diam index Ht(cm/m): 1.7  Ao root diam index BSA (cm/m2): 1.5  Asc Ao diam index BSA (cm/m2): 1.7  Asc Ao diam index Ht(cm/m): 1.9  LA Volume (BP): 79.6 ml     LA Volume Index (BP): 40.6 ml/m2  RV Base: 4.0 cm  RWT: 0.39     Doppler Measurements & Calculations  MV E max duarte: 93.7 cm/sec  MV dec time: 0.12 sec  TR max duarte: 211.7 cm/sec  TR max P.9 mmHg  E/E' av.5  Lateral E/e': 6.9  Medial E/e': 10.2     ______________________________________________________________________________  Report approved by: Stephanie Zendejas 10/11/2023 01:12 PM

## 2023-10-11 NOTE — H&P
[facility]    History and Physical  Hospital Medicine       Date of Admission:  10/10/2023  Date of Service: 10/11/2023     Assessment & Plan   Lan Manuel is a 62 year old male who presents on 10/10/2023 with tachycardia from clinic. Found to be in atrial flutter and is being admitted for management.     Atrial flutter with RVR   New onset, unclear duration PTA. Asymptomatic. Rates 140-150's in ER. Received multiple doses metoprolol IV without rate control. Eventually initiated on diltiazem drip, which controlled rate with slight initial dip in BP (asymptomatic, dropped to 80's/50's briefly with initiation of max dose dilt, then recovered). Titrated off dilt drip, rate & BP remain stable. Asymptomatic, no chest pain or dyspnea and no signs of CHF on admission. Appears on admission to be in aflutter with alternating 2:1 & 4:1 conduction pattern.   -Diltiazem infusion available to restart if rates persistently above 110  -Initiate diltiazem IR 30mg Q6hrs   -Continuous telemetry & pulse ox  -EP consult put in discharge navigator for follow up in clinic  -[id number] score is 1 (HTN) so no anticoagulation indicated at time of admission  -ECHO ordered for AM  -UDS, TSH to look for potential causes    Hypertension  Normotensive since presentation, with short episode of hypotension after fast up-titration of diltiazem infusion in ER. Recovered quickly without symptoms upon weaning off max-dose diltiazem infusion. Managed PTA with lisinopril.   -Hold PTA lisinopril until BP assured to be stable with new diltiazem (above)    Density of unclear significance on CXR  Chest Xray shows ovoid 2.4cm density overlying mid thoracic spine. Most likely a prominent thoracic osteophyte, however un-enhanced chest CT is recommended by radiology to exclude pulmonary nodule.   -Chest CT order placed in discharge navigator for completion & ongoing follow up with PCP for further workup    Nicotine  "dependence  Smokes 1PPD. Declines nicotine patch.     Alcohol use  Drinks 12 pack of beer everyday. Denies history of withdrawal. No current withdrawal sxs at time of admission.   -CIWA with diazepam & oral vitamins    Clinically Significant Risk Factors Present on Admission                  # Hypertension: Noted on problem list      # Overweight: Estimated body mass index is 27.72 kg/m  as calculated from the following:    Height as of this encounter: 1.727 m (5' 8\").    Weight as of this encounter: 82.7 kg (182 lb 5.1 oz).                   Diet: Regular Diet Adult    DVT Prophylaxis: Pneumatic Compression Devices  Hernandez Catheter: Not present  Code Status: Full Code    Lines: PIV    Disposition Plan      Expected Discharge Date: 10/12/2023             Entered: Adry Hastings PA-C 10/11/2023, 12:11 AM     Status: Patient is appropriate for observation   Adry Hastings PA-C        The patient's care was discussed with the Attending Physician, Dr. Colin Ortiz, bedside RN, and the patient .    Primary Care Physician   Jenniffer Matt [phone]    History is obtained from the patient, who is an ok historian, handoff from ER provider, and review of old records via the EMR.    History of Present Illness   Lan Manuel is a 62 year old male with past medical history of alcohol abuse, HTN, tobacco use now presents on 10/10/2023 with tachycardia.     Patient was at follow up in clinic for hypertension and was noticed to have significant tachycardia. Appeared to be irregular rhythm so was sent to ER for further evaluation.     Patient denies any symptoms. Has a chronic cough which is unchanged from baseline. No new or changing sputum production. Denies fever, chills, lightheadedness, dizziness.   Denies chest pain. Denies heart palpitations. Patient felt like after getting diltiazem infusion he could feel his heart slow and felt a slight pressure, but denies any pain or crushing pressure and " denies radiation to neck, back, or arms. Denies LE edema. Denies orthopnea or PND.     Denies any known history of heart arrhythmias.     Endorses drinking a 12 pack of beer every day. Last time he was sober was last time he was in the hospital (Jan 2021). Denies any history of withdrawal symptoms. Denies current tremor, anxiety, cravings.   Also smokes about 1PPD. Declines nicotine patch.     Upon arrival to ER patient received lab and imaging workup. He received IV metoprolol and was eventually started on a diltiazem drip. This was discontinued in the ER when his rate slowed. He is being admitted for monitoring and transition to PO diltiazem.    Review of Systems   Constitutional: denies weight loss, fever, chills  Eyes: denies changes in vision  HENT: denies changes in hearing  Respiratory: see HPI  Cardiovascular: see HPI  Gastroenterology: denies constipation, diarrhea, GERD symptoms  Genitourinary: denies dysuria  Integumentary: no new rashes or skin changes  Musculoskeletal: denies new muscle pain or joint trauma  Neuro: denies numbness, tingling, headaches, tremor  Psychiatric: denies significant changes to mood    Past Medical History     Adenomatous colon polyp 03/16/2021     Priority: Medium    Nicotine dependence 01/14/2021     Priority: Medium    Diverticulitis 01/14/2021     Priority: Medium    Diverticulitis of colon 01/14/2021     Priority: Medium    Appendicular diverticulum 01/14/2021     Priority: Medium    Alcohol use 01/14/2021     Priority: Medium    Left scapula fracture 01/19/2017     Priority: Medium    Hypertension 08/09/2015     Priority: Medium      Past Surgical History   Past Surgical History:   Procedure Laterality Date    COLONOSCOPY N/A 3/2/2021    Procedure: COLONOSCOPY, WITH POLYPECTOMY AND BIOPSY;  Surgeon: Francis Chow MD;  Location: [facility]    IR LUMBAR EPIDURAL STEROID INJECTION  1/30/2007      Prior to Admission Medications   Prior to Admission Medications   Prescriptions  "Last Dose Informant Patient Reported? Taking?   Multiple Vitamins-Minerals (MULTIVITAMIN ADULTS 50+ PO)   Yes No   Omega-3 Fatty Acids (FISH OIL) 1200 MG capsule   Yes No   calcium carbonate (OS-EROS) 600 MG tablet   Yes No   cephALEXin (KEFLEX) 500 MG capsule   Yes No   Sig: Take 500 mg by mouth 4 times daily   cholecalciferol (VITAMIN D3) 10 mcg (400 units) TABS tablet   Yes No   Sig: Take 1,000 Units by mouth   lisinopril (ZESTRIL) 10 MG tablet   No No   Sig: TAKE 1 TABLET (10 MG) BY MOUTH DAILY      Facility-Administered Medications: None     Allergies   No Known Allergies    Family History    Family History   Problem Relation Age of Onset    Myocardial Infarction Mother 37         of MI at 37    No Known Problems Father     Heart Disease Mother 37.00    Allergies Father      Social History   Social History     Socioeconomic History    Marital status:      Physical Exam   /78   Pulse 92   Temp 97.6  F (36.4  C)   Resp 24   Ht 1.727 m (5' 8\")   Wt 82.7 kg (182 lb 5.1 oz)   SpO2 95%   BMI 27.72 kg/m       Weight: 182 lbs 5.13 oz Body mass index is 27.72 kg/m .     Constitutional: Alert, cooperative, no apparent distress, appears nontoxic  Eyes: Eyes are clear  HENT: Oropharynx is clear and moist. No tongue fasciculations. No evidence of cranial trauma.  Cardiovascular: Irregular rhythm with controlled rate at time of admission, no murmur noted. Good peripheral pulses in wrists bilaterally. No pitting lower extremity edema.  Respiratory: Clear to auscultation bilaterally. Unlabored on room air.   GI: Soft, non-tender, normal bowel sounds, non-distended.   Genitourinary: Deferred  Musculoskeletal: Normal muscle bulk, no obvious joint deformities.   Skin: Warm and dry, no rashes. No obvious jaundice but room is darker so difficult to assess for mild changes.   Neurologic: Neck supple.  is symmetric. Speech intact without facial droop. No asterixis. No tremor. Interacting appropriately. "     Data   Data reviewed today:   Recent Labs   Lab 10/10/23  1620   WBC 6.7   HGB 15.4   *      INR 1.02      POTASSIUM 4.7   CHLORIDE 103   CO2 28   BUN 18.2   CR 0.97   ANIONGAP 9   EROS 10.1   *   ALBUMIN 4.5   PROTTOTAL 7.0   BILITOTAL 1.1   ALKPHOS 77   ALT 33   AST 33     Recent Results (from the past 24 hour(s))   Chest XR,  PA & LAT    Narrative    EXAM: XR CHEST 2 VIEWS  LOCATION: [facility]  DATE: 10/10/2023    INDICATION: A fib RVR.  COMPARISON: None.      Impression    IMPRESSION: On the lateral view, overlying the mid thoracic spine, there is an ovoid 2.4 cm density. This most likely represents a prominent thoracic osteophyte. However, unenhanced chest CT is recommended to exclude pulmonary nodule. Several old   left-sided rib fractures. No pulmonary infiltrates. Heart size upper limits of normal.

## 2023-10-11 NOTE — UTILIZATION REVIEW
"  Admission Status; Secondary Review Determination         Under the authority of the Utilization Management Committee, the utilization review process indicated a secondary review on the above patient.  The review outcome is based on review of the medical records, discussions with staff, and applying clinical experience noted on the date of the review.        ()      Inpatient Status Appropriate - This patient's medical care is consistent with medical management for inpatient care and reasonable inpatient medical practice.      (xxx) Observation Status Appropriate - This patient does not meet hospital inpatient criteria and is placed in observation status. If this patient's primary payer is Medicare and was admitted as an inpatient, Condition Code 44 should be used and patient status changed to \"observation\".   () Admission Status NOT Appropriate - This patient's medical care is not consistent with medical management for Inpatient or Observation Status.          RATIONALE FOR DETERMINATION     Lan Alexandre is a 63 yo male with a history of alcohol abuse, HTN, and tobacco use who was admitted on 10/10/2023 with tachycardia noted in clinic visit.  In the ER, he was note to be in atrial flutter with a rate of 140-150s.  Labs unremarkable.  He received IV metoprolol followed by diltiazem drip.  He did have transient hypotension with maximal doses of diltiazem drip which resolved with dose adjustment.  He is admitted for close clinical monitoring, IV medications, and further evaluation.  This morning the diltiazem drip has been stopped and his rates have improved.  Discharge later today is possible and, in that case, observation status is appropriate.    However, if issues develop and he requires additional medications, evaluation, or intervention this hospitalization, consideration should be given to advancing to IP status at that time.  I have paged Dr. Dunbar to discuss.      The severity of illness, intensity of " service provided, expected LOS and risk for adverse outcome make the care complex, high risk and appropriate for hospital admission.        The information on this document is developed by the utilization review team in order for the business office to ensure compliance.  This only denotes the appropriateness of proper admission status and does not reflect the quality of care rendered.         The definitions of Inpatient Status and Observation Status used in making the determination above are those provided in the CMS Coverage Manual, Chapter 1 and Chapter 6, section 70.4.      Sincerely,     Dania Perez MD  Physician Advisor   Utilization Review/ Case Management  [facility].

## 2023-10-11 NOTE — PROGRESS NOTES
"[facility] [place]    Patient admitted to room 1006 at approximately 2340 via wheel chair from emergency room. Patient was accompanied by nurse.     Verbal SBAR report received from RENETTA Shen prior to patient arrival.     Patient ambulated to bed independently. Patient alert and oriented X 3. The patient is not having any pain.  . Admission vital signs: Blood pressure 105/78, pulse 92, temperature 97.6  F (36.4  C), resp. rate 24, height 1.727 m (5' 8\"), weight 82.7 kg (182 lb 5.1 oz), SpO2 95 %. Patient was oriented to plan of care, call light, bed controls, tv, telephone, bathroom, and visiting hours.     Risk Assessment    The following safety risks were identified during admission: none. Yellow risk band applied: NO.     Skin Initial Assessment    This writer admitted this patient and completed a full skin assessment and Clay score in the Adult PCS flowsheet. Appropriate interventions initiated as needed.     Secondary skin check completed by RENETTA Naidu.         Education    Patient has a [place] to Observation order: Yes  Observation education completed and documented: No      Rae Faria RN      "

## 2023-10-11 NOTE — ED NOTES
"[facility]   Admission Handoff    The patient is Lan Manuel, 62 year old who arrived in the ED by CAR from home with a complaint of Tachycardia  . The patient's current symptoms are new and during this time the symptoms have remained the same. In the ED, patient was diagnosed with   Final diagnoses:   Atrial fibrillation with RVR (H)         Needed?: No    Allergies:  No Known Allergies    Past Medical Hx:   Past Medical History:   Diagnosis Date    Atrial fibrillation with RVR (H) 10/10/2023    Hypertension 8/9/2015       Initial vitals were: BP: (!) 133/99  Pulse: (!) 152  Temp: 98.2  F (36.8  C)  Resp: 11  Height: 172.7 cm (5' 8\")  Weight: 81.6 kg (180 lb)  SpO2: 96 %   Recent vital Signs: BP 91/63   Pulse 77   Temp 98.2  F (36.8  C) (Oral)   Resp 14   Ht 1.727 m (5' 8\")   Wt 81.6 kg (180 lb)   SpO2 96%   BMI 27.37 kg/m      Elimination Status: Continent: Yes     Activity Level: Independent    Fall Status: Reason for falls risk: Other- none  nonskid shoes/slippers when out of bed    Baseline Mental status: WDL  Current Mental Status changes: at basesline    Infection present or suspected this encounter: no  Sepsis suspected: No    Isolation type: none    Bariatric equipment needed?: No    In the ED these meds were given:   Medications   diltiazem (CARDIZEM) 125 mg in dextrose 5 % 125 mL infusion (0 mg/hr Intravenous Stopped 10/10/23 2139)   sodium chloride 0.9 % infusion ( Intravenous $New Bag 10/10/23 2112)   sodium chloride 0.9% BOLUS 1,000 mL (1,000 mLs Intravenous $New Bag 10/10/23 2146)   sodium chloride 0.9% BOLUS 1,000 mL (0 mLs Intravenous Stopped 10/10/23 1921)   metoprolol tartrate (LOPRESSOR) tablet 25 mg (25 mg Oral $Given 10/10/23 1646)   metoprolol (LOPRESSOR) injection 5 mg (5 mg Intravenous $Given 10/10/23 1647)   multivitamin, therapeutic (THERA-VIT) tablet 1 tablet (1 tablet Oral $Given 10/10/23 1644)   folic acid (FOLVITE) tablet 1 mg (1 mg " Oral $Given 10/10/23 1646)   thiamine (B-1) tablet 100 mg (100 mg Oral $Given 10/10/23 1646)   magnesium oxide (MAG-OX) tablet 800 mg (800 mg Oral $Given 10/10/23 1646)   magnesium sulfate 2 g in 50 mL sterile water intermittent infusion (0 g Intravenous Stopped 10/10/23 1755)   metoprolol tartrate (LOPRESSOR) tablet 50 mg (50 mg Oral $Given 10/10/23 1754)   metoprolol (LOPRESSOR) injection 5 mg (5 mg Intravenous $Given 10/10/23 1754)       Drips running?  Yes    Home pump  No    Current LDAs: Peripheral IV: Site R AC; Gauge 18  normal saline     Results:   Labs/Imaging  Ordered and Resulted from Time of ED Arrival Up to the Time of Departure from the ED  Results for orders placed or performed during the hospital encounter of 10/10/23 (from the past 24 hour(s))   [place] Draw *Canceled*    Narrative    The following orders were created for panel order [place] Draw.  Procedure                               Abnormality         Status                     ---------                               -----------         ------                       Please view results for these tests on the individual orders.   CBC with Platelets & Differential    Narrative    The following orders were created for panel order CBC with Platelets & Differential.  Procedure                               Abnormality         Status                     ---------                               -----------         ------                     CBC with platelets and d...[[id number]]  Abnormal            Final result                 Please view results for these tests on the individual orders.   Comprehensive metabolic panel   Result Value Ref Range    Sodium 140 135 - 145 mmol/L    Potassium 4.7 3.4 - 5.3 mmol/L    Carbon Dioxide (CO2) 28 22 - 29 mmol/L    Anion Gap 9 7 - 15 mmol/L    Urea Nitrogen 18.2 8.0 - 23.0 mg/dL    Creatinine 0.97 0.67 - 1.17 mg/dL    GFR Estimate 89 >60 mL/min/1.73m2    Calcium 10.1 8.8 - 10.2 mg/dL    Chloride 103 98 - 107  mmol/L    Glucose 108 (H) 70 - 99 mg/dL    Alkaline Phosphatase 77 40 - 129 U/L    AST 33 0 - 45 U/L    ALT 33 0 - 70 U/L    Protein Total 7.0 6.4 - 8.3 g/dL    Albumin 4.5 3.5 - 5.2 g/dL    Bilirubin Total 1.1 <=1.2 mg/dL   [place] Draw *Canceled*    Narrative    The following orders were created for panel order [place] Draw.  Procedure                               Abnormality         Status                     ---------                               -----------         ------                     Extra Blue Top Tube[[id number]]                                                           Please view results for these tests on the individual orders.   INR   Result Value Ref Range    INR 1.02 0.85 - 1.15   PTT   Result Value Ref Range    aPTT 27 22 - 38 Seconds   TSH with free T4 reflex   Result Value Ref Range    TSH 2.49 0.30 - 4.20 uIU/mL   CBC with platelets and differential   Result Value Ref Range    WBC Count 6.7 4.0 - 11.0 10e3/uL    RBC Count 4.50 4.40 - 5.90 10e6/uL    Hemoglobin 15.4 13.3 - 17.7 g/dL    Hematocrit 45.7 40.0 - 53.0 %     (H) 78 - 100 fL    MCH 34.2 (H) 26.5 - 33.0 pg    MCHC 33.7 31.5 - 36.5 g/dL    RDW 12.7 10.0 - 15.0 %    Platelet Count 193 150 - 450 10e3/uL    % Neutrophils 54 %    % Lymphocytes 33 %    % Monocytes 11 %    Mids % (Monos, Eos, Basos)      % Eosinophils 1 %    % Basophils 1 %    % Immature Granulocytes 0 %    NRBCs per 100 WBC 0 <1 /100    Absolute Neutrophils 3.7 1.6 - 8.3 10e3/uL    Absolute Lymphocytes 2.2 0.8 - 5.3 10e3/uL    Absolute Monocytes 0.7 0.0 - 1.3 10e3/uL    Mids Abs (Monos, Eos, Basos)      Absolute Eosinophils 0.0 0.0 - 0.7 10e3/uL    Absolute Basophils 0.0 0.0 - 0.2 10e3/uL    Absolute Immature Granulocytes 0.0 <=0.4 10e3/uL    Absolute NRBCs 0.0 10e3/uL   Magnesium   Result Value Ref Range    Magnesium 2.0 1.7 - 2.3 mg/dL   Chest XR,  PA & LAT    Narrative    EXAM: XR CHEST 2 VIEWS  LOCATION: [facility]  DATE:  10/10/2023    INDICATION: A fib RVR.  COMPARISON: None.      Impression    IMPRESSION: On the lateral view, overlying the mid thoracic spine, there is an ovoid 2.4 cm density. This most likely represents a prominent thoracic osteophyte. However, unenhanced chest CT is recommended to exclude pulmonary nodule. Several old   left-sided rib fractures. No pulmonary infiltrates. Heart size upper limits of normal.       For the majority of the shift this patient's behavior was Green     Cardiac Rhythm: Atrial rhythm  Pt needs tele? Yes  Skin/wound Issues:  cyst removed from back of neck, open wound present and small amount of purulent drainage    Code Status: Full Code    Pain control: pt had none    Nausea control: pt had none    Abnormal labs/tests/findings requiring intervention: See above    Patient tested for COVID 19 prior to admission: NO     OBS brochure/video discussed/provided to patient/family: N/A     Family present during ED course? Yes     Family Comments/Social Situation comments: Lives at home with wife    Tasks needing completion: None    Hermelinda Kohler RN

## 2023-10-11 NOTE — TELECONSULT
Tele ICU Note:    Reviewed chart of this patient. No additional recommendations from TELE-ICU.     Fatemeh Dickerson MD  October 11, 2023

## 2023-10-12 ENCOUNTER — HOSPITAL ENCOUNTER (INPATIENT)
Facility: HOSPITAL | Age: 63
LOS: 2 days | Discharge: HOME | End: 2023-10-14
Attending: STUDENT IN AN ORGANIZED HEALTH CARE EDUCATION/TRAINING PROGRAM | Admitting: STUDENT IN AN ORGANIZED HEALTH CARE EDUCATION/TRAINING PROGRAM
Payer: COMMERCIAL

## 2023-10-12 VITALS
WEIGHT: 177.25 LBS | HEIGHT: 68 IN | BODY MASS INDEX: 26.86 KG/M2 | DIASTOLIC BLOOD PRESSURE: 89 MMHG | SYSTOLIC BLOOD PRESSURE: 118 MMHG | RESPIRATION RATE: 20 BRPM | HEART RATE: 147 BPM | OXYGEN SATURATION: 95 % | TEMPERATURE: 99.7 F

## 2023-10-12 DIAGNOSIS — I48.91 ATRIAL FIBRILLATION WITH RAPID VENTRICULAR RESPONSE (H): Primary | ICD-10-CM

## 2023-10-12 DIAGNOSIS — E78.2 MIXED HYPERLIPIDEMIA: ICD-10-CM

## 2023-10-12 LAB
ERYTHROCYTE [DISTWIDTH] IN BLOOD BY AUTOMATED COUNT: 12.8 % (ref 10–15)
GLUCOSE BLDC GLUCOMTR-MCNC: 142 MG/DL (ref 70–99)
HCT VFR BLD AUTO: 47.3 % (ref 40–53)
HGB BLD-MCNC: 15.9 G/DL (ref 13.3–17.7)
MCH RBC QN AUTO: 34 PG (ref 26.5–33)
MCHC RBC AUTO-ENTMCNC: 33.6 G/DL (ref 31.5–36.5)
MCV RBC AUTO: 101 FL (ref 78–100)
NT-PROBNP SERPL-MCNC: 1449 PG/ML (ref 0–900)
PLATELET # BLD AUTO: 163 10E3/UL (ref 150–450)
RBC # BLD AUTO: 4.68 10E6/UL (ref 4.4–5.9)
WBC # BLD AUTO: 9.9 10E3/UL (ref 4–11)

## 2023-10-12 PROCEDURE — 258N000003 HC RX IP 258 OP 636

## 2023-10-12 PROCEDURE — 250N000011 HC RX IP 250 OP 636

## 2023-10-12 PROCEDURE — 99291 CRITICAL CARE FIRST HOUR: CPT | Performed by: INTERNAL MEDICINE

## 2023-10-12 PROCEDURE — 36415 COLL VENOUS BLD VENIPUNCTURE: CPT | Performed by: INTERNAL MEDICINE

## 2023-10-12 PROCEDURE — 99292 CRITICAL CARE ADDL 30 MIN: CPT | Performed by: INTERNAL MEDICINE

## 2023-10-12 PROCEDURE — 99223 1ST HOSP IP/OBS HIGH 75: CPT | Performed by: STUDENT IN AN ORGANIZED HEALTH CARE EDUCATION/TRAINING PROGRAM

## 2023-10-12 PROCEDURE — 250N000009 HC RX 250: Performed by: INTERNAL MEDICINE

## 2023-10-12 PROCEDURE — 99238 HOSP IP/OBS DSCHRG MGMT 30/<: CPT | Performed by: INTERNAL MEDICINE

## 2023-10-12 PROCEDURE — 99406 BEHAV CHNG SMOKING 3-10 MIN: CPT | Performed by: STUDENT IN AN ORGANIZED HEALTH CARE EDUCATION/TRAINING PROGRAM

## 2023-10-12 PROCEDURE — 210N000001 HC R&B IMCU HEART CARE

## 2023-10-12 PROCEDURE — 258N000003 HC RX IP 258 OP 636: Performed by: INTERNAL MEDICINE

## 2023-10-12 PROCEDURE — 250N000013 HC RX MED GY IP 250 OP 250 PS 637

## 2023-10-12 PROCEDURE — 250N000011 HC RX IP 250 OP 636: Mod: JZ

## 2023-10-12 PROCEDURE — 250N000011 HC RX IP 250 OP 636: Mod: JZ | Performed by: INTERNAL MEDICINE

## 2023-10-12 PROCEDURE — 85027 COMPLETE CBC AUTOMATED: CPT | Performed by: INTERNAL MEDICINE

## 2023-10-12 RX ORDER — DILTIAZEM HCL/D5W 125 MG/125
5-15 PLASTIC BAG, INJECTION (ML) INTRAVENOUS CONTINUOUS
Status: DISCONTINUED | OUTPATIENT
Start: 2023-10-12 | End: 2023-10-13

## 2023-10-12 RX ORDER — LISINOPRIL 5 MG/1
10 TABLET ORAL DAILY
Status: DISCONTINUED | OUTPATIENT
Start: 2023-10-13 | End: 2023-10-14 | Stop reason: HOSPADM

## 2023-10-12 RX ORDER — ASPIRIN 81 MG/1
81 TABLET ORAL DAILY
Status: DISCONTINUED | OUTPATIENT
Start: 2023-10-13 | End: 2023-10-13

## 2023-10-12 RX ORDER — PROCHLORPERAZINE MALEATE 10 MG
10 TABLET ORAL EVERY 6 HOURS PRN
Status: DISCONTINUED | OUTPATIENT
Start: 2023-10-12 | End: 2023-10-14 | Stop reason: HOSPADM

## 2023-10-12 RX ORDER — ACETAMINOPHEN 650 MG/1
650 SUPPOSITORY RECTAL EVERY 6 HOURS PRN
Status: DISCONTINUED | OUTPATIENT
Start: 2023-10-12 | End: 2023-10-14 | Stop reason: HOSPADM

## 2023-10-12 RX ORDER — DIGOXIN 0.25 MG/ML
250 INJECTION INTRAMUSCULAR; INTRAVENOUS ONCE
Status: COMPLETED | OUTPATIENT
Start: 2023-10-12 | End: 2023-10-12

## 2023-10-12 RX ORDER — DIGOXIN 125 MCG
125 TABLET ORAL DAILY
Status: DISCONTINUED | OUTPATIENT
Start: 2023-10-14 | End: 2023-10-13

## 2023-10-12 RX ORDER — ONDANSETRON 4 MG/1
4 TABLET, ORALLY DISINTEGRATING ORAL EVERY 6 HOURS PRN
Status: DISCONTINUED | OUTPATIENT
Start: 2023-10-12 | End: 2023-10-14 | Stop reason: HOSPADM

## 2023-10-12 RX ORDER — ATORVASTATIN CALCIUM 40 MG/1
40 TABLET, FILM COATED ORAL EVERY EVENING
Status: DISCONTINUED | OUTPATIENT
Start: 2023-10-12 | End: 2023-10-14 | Stop reason: HOSPADM

## 2023-10-12 RX ORDER — ENOXAPARIN SODIUM 100 MG/ML
40 INJECTION SUBCUTANEOUS EVERY 24 HOURS
Status: DISCONTINUED | OUTPATIENT
Start: 2023-10-12 | End: 2023-10-12 | Stop reason: ALTCHOICE

## 2023-10-12 RX ORDER — HEPARIN SODIUM 10000 [USP'U]/100ML
0-5000 INJECTION, SOLUTION INTRAVENOUS CONTINUOUS
Status: DISCONTINUED | OUTPATIENT
Start: 2023-10-12 | End: 2023-10-12 | Stop reason: HOSPADM

## 2023-10-12 RX ORDER — HYDRALAZINE HYDROCHLORIDE 20 MG/ML
10 INJECTION INTRAMUSCULAR; INTRAVENOUS EVERY 6 HOURS PRN
Status: DISCONTINUED | OUTPATIENT
Start: 2023-10-12 | End: 2023-10-14 | Stop reason: HOSPADM

## 2023-10-12 RX ORDER — FUROSEMIDE 10 MG/ML
40 INJECTION INTRAMUSCULAR; INTRAVENOUS EVERY 8 HOURS
Status: DISCONTINUED | OUTPATIENT
Start: 2023-10-13 | End: 2023-10-12 | Stop reason: HOSPADM

## 2023-10-12 RX ORDER — METOPROLOL TARTRATE 1 MG/ML
5 INJECTION, SOLUTION INTRAVENOUS EVERY 5 MIN PRN
Status: DISCONTINUED | OUTPATIENT
Start: 2023-10-12 | End: 2023-10-14 | Stop reason: HOSPADM

## 2023-10-12 RX ORDER — PROCHLORPERAZINE 25 MG
25 SUPPOSITORY, RECTAL RECTAL EVERY 12 HOURS PRN
Status: DISCONTINUED | OUTPATIENT
Start: 2023-10-12 | End: 2023-10-14 | Stop reason: HOSPADM

## 2023-10-12 RX ORDER — DIGOXIN 125 MCG
125 TABLET ORAL EVERY 6 HOURS
Status: COMPLETED | OUTPATIENT
Start: 2023-10-13 | End: 2023-10-13

## 2023-10-12 RX ORDER — DIGOXIN 0.25 MG/ML
250 INJECTION INTRAMUSCULAR; INTRAVENOUS ONCE
Qty: 2 ML | Refills: 0 | Status: COMPLETED | OUTPATIENT
Start: 2023-10-12 | End: 2023-10-12

## 2023-10-12 RX ORDER — DIGOXIN 125 MCG
125 TABLET ORAL DAILY
Status: DISCONTINUED | OUTPATIENT
Start: 2023-10-13 | End: 2023-10-12 | Stop reason: HOSPADM

## 2023-10-12 RX ORDER — LISINOPRIL 10 MG/1
10 TABLET ORAL DAILY
Status: DISCONTINUED | OUTPATIENT
Start: 2023-10-13 | End: 2023-10-12 | Stop reason: HOSPADM

## 2023-10-12 RX ORDER — METOPROLOL TARTRATE 25 MG/1
25 TABLET, FILM COATED ORAL EVERY 6 HOURS
Status: DISCONTINUED | OUTPATIENT
Start: 2023-10-12 | End: 2023-10-12 | Stop reason: HOSPADM

## 2023-10-12 RX ORDER — FUROSEMIDE 10 MG/ML
40 INJECTION INTRAMUSCULAR; INTRAVENOUS DAILY
Status: DISCONTINUED | OUTPATIENT
Start: 2023-10-12 | End: 2023-10-12

## 2023-10-12 RX ORDER — ACETAMINOPHEN 325 MG/1
650 TABLET ORAL EVERY 6 HOURS PRN
Status: DISCONTINUED | OUTPATIENT
Start: 2023-10-12 | End: 2023-10-14 | Stop reason: HOSPADM

## 2023-10-12 RX ORDER — METOPROLOL TARTRATE 25 MG/1
25 TABLET, FILM COATED ORAL 2 TIMES DAILY
Status: DISCONTINUED | OUTPATIENT
Start: 2023-10-13 | End: 2023-10-14 | Stop reason: HOSPADM

## 2023-10-12 RX ORDER — DIGOXIN 125 MCG
125 TABLET ORAL EVERY 6 HOURS
Qty: 3 TABLET | Refills: 0 | Status: DISCONTINUED | OUTPATIENT
Start: 2023-10-13 | End: 2023-10-12

## 2023-10-12 RX ORDER — ONDANSETRON 2 MG/ML
4 INJECTION INTRAMUSCULAR; INTRAVENOUS EVERY 6 HOURS PRN
Status: DISCONTINUED | OUTPATIENT
Start: 2023-10-12 | End: 2023-10-14 | Stop reason: HOSPADM

## 2023-10-12 RX ORDER — HEPARIN SODIUM 10000 [USP'U]/100ML
0-5000 INJECTION, SOLUTION INTRAVENOUS CONTINUOUS
Status: DISCONTINUED | OUTPATIENT
Start: 2023-10-12 | End: 2023-10-13

## 2023-10-12 RX ADMIN — DIGOXIN 250 MCG: 0.25 INJECTION INTRAMUSCULAR; INTRAVENOUS at 17:31

## 2023-10-12 RX ADMIN — Medication 10 MG/HR: at 02:50

## 2023-10-12 RX ADMIN — SODIUM CHLORIDE 1000 ML: 9 INJECTION, SOLUTION INTRAVENOUS at 03:27

## 2023-10-12 RX ADMIN — METOPROLOL TARTRATE 25 MG: 25 TABLET, FILM COATED ORAL at 20:16

## 2023-10-12 RX ADMIN — MULTIPLE VITAMINS W/ MINERALS TAB 1 TABLET: TAB at 08:30

## 2023-10-12 RX ADMIN — DIGOXIN 250 MCG: 0.25 INJECTION INTRAMUSCULAR; INTRAVENOUS at 20:16

## 2023-10-12 RX ADMIN — DILTIAZEM HYDROCHLORIDE 10 MG/HR: 5 INJECTION INTRAVENOUS at 15:12

## 2023-10-12 RX ADMIN — FUROSEMIDE 40 MG: 10 INJECTION, SOLUTION INTRAMUSCULAR; INTRAVENOUS at 17:31

## 2023-10-12 RX ADMIN — HEPARIN SODIUM 1000 UNITS/HR: 10000 INJECTION, SOLUTION INTRAVENOUS at 18:16

## 2023-10-12 RX ADMIN — THIAMINE HCL TAB 100 MG 100 MG: 100 TAB at 08:30

## 2023-10-12 RX ADMIN — FOLIC ACID 1 MG: 1 TABLET ORAL at 08:30

## 2023-10-12 ASSESSMENT — ACTIVITIES OF DAILY LIVING (ADL)
ADLS_ACUITY_SCORE: 18
ADLS_ACUITY_SCORE: 19
ADLS_ACUITY_SCORE: 35
ADLS_ACUITY_SCORE: 18
ADLS_ACUITY_SCORE: 19
ADLS_ACUITY_SCORE: 18
ADLS_ACUITY_SCORE: 19
ADLS_ACUITY_SCORE: 19
ADLS_ACUITY_SCORE: 18

## 2023-10-12 NOTE — PROGRESS NOTES
End Of Shift Note    Situation: Patient was at follow up in clinic for hypertension and was noticed to have significant tachycardia. Appeared to be irregular rhythm so was sent to ER 10/10 for further evaluation. Echo on 10/11 shows an EF of 20-25%. (See report)    Plan: dilt gtt, keep po dilt prn for HR >110, likely needs DANA and cardioversion/ anticoagulation. Will have cards consult on 10/12    -continue to Hold PTA lisinopril until BP assured to be stable with new diltiazem    -continue CIWA with diazepam & oral vitamins. If he starts withdrawing, would start neurontin protocol.        Subjective/Objective:    Neuro: A&O x4. CIWA score of 0.     Cardiac: Continues A-Fib rate fluctuates widely, 's. At the lower end while @ rest. Using the urinal @ BS causes an increase in rate and BP.     Resp: LSCTA. One time with exp wheezing that cleared with cough.     GI/: Continent of B&B. Using the urinal @BS. QS UOP.    Endo: 2 AM blood sugar 142.     MSK: WNL    Skin: Small incisional area on back of neck from recent drainage of pimple/cyst. ROSENDO no drainage noted    LDAs: PIV x1. Dilt gtt and NS infusing.     Denies pain. Does become a bit agitated when woke for assessments.

## 2023-10-12 NOTE — PLAN OF CARE
Problem: Comorbidity Management  Goal: Blood Pressure in Desired Range  Intervention: Maintain Blood Pressure Management   Blood pressors vary throughout the noc shift. Diltiazem continued @10 mg/h. Patient wants to go home and get a bit agitated when waken for assessments.       Problem: Fall Injury Risk  Goal: Absence of Fall and Fall-Related Injury  Intervention: Promote Injury-Free Environment   activity supervised   clutter free environment maintained   increase visualization of patient   lighting adjusted   mobility aid in reach   nonskid shoes/slippers when out of bed   supervised activity   Using call light. Steady.

## 2023-10-12 NOTE — DISCHARGE SUMMARY
[facility] Medicine Progress Note  Date of Service (when I saw the patient): 10/12/2023    REASON FOR ADMISSION / INTERVAL HISTORY:  Patient was at follow up in clinic for hypertension and was noticed to have significant tachycardia. Appeared to be irregular rhythm so was sent to ER on 10/10 for further evaluation.     ECHO 10/11-Left ventricular systolic function is severely reduced.The visual ejection  fraction is 20-25%.There is severe global hypokinesia of the left ventricle  with some regionality with basal inferolatreal wall mars the best.  Moderately decreased right ventricular systolic function  Moderate bi-atrial enlargement.  There is mild (1+) tricuspid regurgitation.  IVC diameter >2.1 cm collapsing <50% with sniff suggests a high RA pressure  estimated at 15 mmHg or greater.  The rhythm was atrial fibrillation.    Assessment/ Plan     Lan Manuel is a 62 year old male who presents on 10/10/2023 with tachycardia from clinic. Found to be in atrial flutter and is being admitted for management.      Atrial flutter with RVR   New onset, unclear duration PTA. Asymptomatic. Rates 140-150's in ER. Received multiple doses metoprolol IV without rate control. Eventually initiated on diltiazem drip, which controlled rate with slight initial dip in BP (asymptomatic, dropped to 80's/50's briefly with initiation of max dose dilt, then recovered). Titrated off dilt drip in 3 hrs, rate & BP remain stable.   [id number] score is 1 (HTN) so no anticoagulation started  at time of admission. TSH nl. Drug screen positive for cannabis  Continues to be on dilt drip today. After stopping dilt gtt and starting po dilt on 10/11, pt went back in RVR in 130s. Consulted cards today. ECHO showed EF 20-25% with severe global hypokinesis--see details above  Await cards consult.       Hypertension  Normotensive since presentation, with short episode of hypotension after fast  "up-titration of diltiazem infusion in ER. Recovered quickly without symptoms upon weaning off max-dose diltiazem infusion. Managed PTA with lisinopril.   BP stable 130-150s systolic.  -will resume lisinopril 10mg/day     Density of unclear significance on CXR  Chest Xray shows ovoid 2.4cm density overlying mid thoracic spine. Most likely a prominent thoracic osteophyte, however un-enhanced chest CT is recommended by radiology to exclude pulmonary nodule.   -Chest CT order placed in discharge navigator for completion & ongoing follow up with PCP for further workup     Nicotine dependence  Smokes 1PPD. Declines nicotine patch.      Alcohol use  Drinks 12 pack of beer everyday. Denies history of withdrawal. No current withdrawal sxs at time of admission.   -continue CIWA with diazepam & oral vitamins. If he starts withdrawing, would start neurontin protocol.           Diet: Regular Diet Adult    DVT Prophylaxis: Pneumatic Compression Devices  Hernandez Catheter: Not present  Code Status: Full Code        GLENN FARR MD   Pg [phone]        ROS:  As described in A/P and Exam.  Otherwise ALL are  negative.    PHYSICAL EXAM:  All vitals have been reviewed    Blood pressure 138/80, pulse 92, temperature 97.8  F (36.6  C), resp. rate 20, height 1.727 m (5' 8\"), weight 83.4 kg (183 lb 13.8 oz), SpO2 97%.    I/O this shift:  In: 70 [I.V.:70]  Out: 700 [Urine:700]    GENERAL APPEARANCE: healthy, alert and no distress  EYES: conjunctiva clear, eyes grossly normal  HENT: external ears and nose normal   RESP: lungs clear to auscultation - no rales, rhonchi or wheezes  CV: regular rate and rhythm, normal S1 S2, no S3 or S4 and no murmur, click or rub   ABDOMEN: soft, nontender, no HSM or masses and bowel sounds normal  MS: no clubbing, cyanosis; no edema  SKIN: clear without significant rashes or lesions  NEURO: -non-focal moves all 4 extr    ROUTINE  LABS (Last four results)  CMP  Recent Labs   Lab 10/12/23  0230 10/11/23  0543 " 10/10/23  1620   NA  --  138 140   POTASSIUM  --  4.3 4.7   CHLORIDE  --  107 103   CO2  --  22 28   ANIONGAP  --  9 9   * 121* 108*   BUN  --  15.7 18.2   CR  --  0.90 0.97   GFRESTIMATED  --  >90 89   EROS  --  9.0 10.1   MAG  --   --  2.0   PHOS  --   --  4.6*   PROTTOTAL  --  5.9* 7.0   ALBUMIN  --  3.8 4.5   BILITOTAL  --  1.2 1.1   ALKPHOS  --  67 77   AST  --  38 33   ALT  --  42 33     CBC  Recent Labs   Lab 10/11/23  0543 10/10/23  1620   WBC 6.0 6.7   RBC 4.19* 4.50   HGB 14.3 15.4   HCT 42.6 45.7   * 102*   MCH 34.1* 34.2*   MCHC 33.6 33.7   RDW 13.1 12.7    193     INR  Recent Labs   Lab 10/10/23  1620   INR 1.02     Arterial Blood GasNo lab results found in last 7 days.    No results found for this or any previous visit (from the past 24 hour(s)).

## 2023-10-12 NOTE — PLAN OF CARE
Goal Outcome Evaluation:      Plan of Care Reviewed With: patient    Overall Patient Progress: improvingOverall Patient Progress: improving    End Of Shift Note    Subjective/Objective:    Neuro: A&OX4, gait steady, following commands, using call light appropriately    Cardiac: apical pulse irregular, remains in atrial fib/flutter, denies chest pain, chest pressure or chest tightness    Resp: lung sounds clear bilaterally, denies pain with deep breathing    GI/: voiding spontaneously    MSK: up walking around the room independently, gait steady    Skin: healing incision back of neck    LDAs: PIV X 1, infusing cardizem drip

## 2023-10-13 ENCOUNTER — ANESTHESIA (OUTPATIENT)
Dept: INTENSIVE CARE | Facility: HOSPITAL | Age: 63
End: 2023-10-13
Payer: COMMERCIAL

## 2023-10-13 ENCOUNTER — ANCILLARY PROCEDURE (OUTPATIENT)
Dept: INTENSIVE CARE | Facility: HOSPITAL | Age: 63
End: 2023-10-13
Attending: INTERNAL MEDICINE
Payer: COMMERCIAL

## 2023-10-13 ENCOUNTER — APPOINTMENT (OUTPATIENT)
Dept: CARDIOLOGY | Facility: HOSPITAL | Age: 63
End: 2023-10-13
Attending: INTERNAL MEDICINE
Payer: COMMERCIAL

## 2023-10-13 ENCOUNTER — PATIENT OUTREACH (OUTPATIENT)
Dept: GASTROENTEROLOGY | Facility: CLINIC | Age: 63
End: 2023-10-13
Payer: COMMERCIAL

## 2023-10-13 ENCOUNTER — ANESTHESIA EVENT (OUTPATIENT)
Dept: INTENSIVE CARE | Facility: HOSPITAL | Age: 63
End: 2023-10-13
Payer: COMMERCIAL

## 2023-10-13 LAB
ANION GAP SERPL CALCULATED.3IONS-SCNC: 10 MMOL/L (ref 7–15)
BUN SERPL-MCNC: 9.6 MG/DL (ref 8–23)
CALCIUM SERPL-MCNC: 9 MG/DL (ref 8.8–10.2)
CHLORIDE SERPL-SCNC: 104 MMOL/L (ref 98–107)
CHOLEST SERPL-MCNC: 147 MG/DL
CREAT SERPL-MCNC: 0.87 MG/DL (ref 0.67–1.17)
DEPRECATED HCO3 PLAS-SCNC: 23 MMOL/L (ref 22–29)
EGFRCR SERPLBLD CKD-EPI 2021: >90 ML/MIN/1.73M2
ERYTHROCYTE [DISTWIDTH] IN BLOOD BY AUTOMATED COUNT: 12.5 % (ref 10–15)
ERYTHROCYTE [DISTWIDTH] IN BLOOD BY AUTOMATED COUNT: 12.7 % (ref 10–15)
GLUCOSE SERPL-MCNC: 127 MG/DL (ref 70–99)
HBA1C MFR BLD: 6.2 %
HCT VFR BLD AUTO: 45 % (ref 40–53)
HCT VFR BLD AUTO: 47.2 % (ref 40–53)
HDLC SERPL-MCNC: 60 MG/DL
HGB BLD-MCNC: 15.1 G/DL (ref 13.3–17.7)
HGB BLD-MCNC: 16 G/DL (ref 13.3–17.7)
LDLC SERPL CALC-MCNC: 65 MG/DL
LVEF ECHO: NORMAL
MCH RBC QN AUTO: 33.5 PG (ref 26.5–33)
MCH RBC QN AUTO: 33.8 PG (ref 26.5–33)
MCHC RBC AUTO-ENTMCNC: 33.6 G/DL (ref 31.5–36.5)
MCHC RBC AUTO-ENTMCNC: 33.9 G/DL (ref 31.5–36.5)
MCV RBC AUTO: 100 FL (ref 78–100)
MCV RBC AUTO: 100 FL (ref 78–100)
NONHDLC SERPL-MCNC: 87 MG/DL
PLATELET # BLD AUTO: 156 10E3/UL (ref 150–450)
PLATELET # BLD AUTO: 173 10E3/UL (ref 150–450)
POTASSIUM SERPL-SCNC: 3.9 MMOL/L (ref 3.4–5.3)
RBC # BLD AUTO: 4.51 10E6/UL (ref 4.4–5.9)
RBC # BLD AUTO: 4.74 10E6/UL (ref 4.4–5.9)
SODIUM SERPL-SCNC: 137 MMOL/L (ref 135–145)
TRIGL SERPL-MCNC: 108 MG/DL
UFH PPP CHRO-ACNC: 0.16 IU/ML
UFH PPP CHRO-ACNC: 0.35 IU/ML
UFH PPP CHRO-ACNC: 0.35 IU/ML
WBC # BLD AUTO: 12.5 10E3/UL (ref 4–11)
WBC # BLD AUTO: 13.1 10E3/UL (ref 4–11)

## 2023-10-13 PROCEDURE — 93325 DOPPLER ECHO COLOR FLOW MAPG: CPT | Mod: 26 | Performed by: INTERNAL MEDICINE

## 2023-10-13 PROCEDURE — 36415 COLL VENOUS BLD VENIPUNCTURE: CPT | Performed by: STUDENT IN AN ORGANIZED HEALTH CARE EDUCATION/TRAINING PROGRAM

## 2023-10-13 PROCEDURE — 83036 HEMOGLOBIN GLYCOSYLATED A1C: CPT | Performed by: STUDENT IN AN ORGANIZED HEALTH CARE EDUCATION/TRAINING PROGRAM

## 2023-10-13 PROCEDURE — 250N000009 HC RX 250: Performed by: STUDENT IN AN ORGANIZED HEALTH CARE EDUCATION/TRAINING PROGRAM

## 2023-10-13 PROCEDURE — 93325 DOPPLER ECHO COLOR FLOW MAPG: CPT

## 2023-10-13 PROCEDURE — 85027 COMPLETE CBC AUTOMATED: CPT | Performed by: STUDENT IN AN ORGANIZED HEALTH CARE EDUCATION/TRAINING PROGRAM

## 2023-10-13 PROCEDURE — 80061 LIPID PANEL: CPT | Performed by: STUDENT IN AN ORGANIZED HEALTH CARE EDUCATION/TRAINING PROGRAM

## 2023-10-13 PROCEDURE — 85520 HEPARIN ASSAY: CPT | Performed by: STUDENT IN AN ORGANIZED HEALTH CARE EDUCATION/TRAINING PROGRAM

## 2023-10-13 PROCEDURE — 99152 MOD SED SAME PHYS/QHP 5/>YRS: CPT | Performed by: INTERNAL MEDICINE

## 2023-10-13 PROCEDURE — 93312 ECHO TRANSESOPHAGEAL: CPT | Mod: 26 | Performed by: INTERNAL MEDICINE

## 2023-10-13 PROCEDURE — 210N000001 HC R&B IMCU HEART CARE

## 2023-10-13 PROCEDURE — 250N000011 HC RX IP 250 OP 636: Performed by: STUDENT IN AN ORGANIZED HEALTH CARE EDUCATION/TRAINING PROGRAM

## 2023-10-13 PROCEDURE — 5A2204Z RESTORATION OF CARDIAC RHYTHM, SINGLE: ICD-10-PCS | Performed by: INTERNAL MEDICINE

## 2023-10-13 PROCEDURE — 99207 PR APP CREDIT; MD BILLING SHARED VISIT: CPT | Performed by: STUDENT IN AN ORGANIZED HEALTH CARE EDUCATION/TRAINING PROGRAM

## 2023-10-13 PROCEDURE — 99233 SBSQ HOSP IP/OBS HIGH 50: CPT | Mod: 25 | Performed by: INTERNAL MEDICINE

## 2023-10-13 PROCEDURE — 93320 DOPPLER ECHO COMPLETE: CPT | Mod: 26 | Performed by: INTERNAL MEDICINE

## 2023-10-13 PROCEDURE — 2894A VOIDCORRECT: CPT | Performed by: INTERNAL MEDICINE

## 2023-10-13 PROCEDURE — 370N000017 HC ANESTHESIA TECHNICAL FEE, PER MIN

## 2023-10-13 PROCEDURE — 93005 ELECTROCARDIOGRAM TRACING: CPT | Performed by: INTERNAL MEDICINE

## 2023-10-13 PROCEDURE — 92960 CARDIOVERSION ELECTRIC EXT: CPT | Performed by: INTERNAL MEDICINE

## 2023-10-13 PROCEDURE — 93005 ELECTROCARDIOGRAM TRACING: CPT

## 2023-10-13 PROCEDURE — 258N000003 HC RX IP 258 OP 636: Performed by: INTERNAL MEDICINE

## 2023-10-13 PROCEDURE — 93010 ELECTROCARDIOGRAM REPORT: CPT | Performed by: INTERNAL MEDICINE

## 2023-10-13 PROCEDURE — 250N000011 HC RX IP 250 OP 636: Performed by: INTERNAL MEDICINE

## 2023-10-13 PROCEDURE — 250N000009 HC RX 250: Performed by: NURSE ANESTHETIST, CERTIFIED REGISTERED

## 2023-10-13 PROCEDURE — 80048 BASIC METABOLIC PNL TOTAL CA: CPT | Performed by: STUDENT IN AN ORGANIZED HEALTH CARE EDUCATION/TRAINING PROGRAM

## 2023-10-13 PROCEDURE — 250N000009 HC RX 250: Performed by: INTERNAL MEDICINE

## 2023-10-13 PROCEDURE — 250N000013 HC RX MED GY IP 250 OP 250 PS 637: Performed by: STUDENT IN AN ORGANIZED HEALTH CARE EDUCATION/TRAINING PROGRAM

## 2023-10-13 PROCEDURE — 250N000013 HC RX MED GY IP 250 OP 250 PS 637: Performed by: INTERNAL MEDICINE

## 2023-10-13 RX ORDER — SODIUM CHLORIDE 9 MG/ML
INJECTION, SOLUTION INTRAVENOUS CONTINUOUS PRN
Status: COMPLETED | OUTPATIENT
Start: 2023-10-13 | End: 2023-10-13

## 2023-10-13 RX ORDER — NALOXONE HYDROCHLORIDE 0.4 MG/ML
0.2 INJECTION, SOLUTION INTRAMUSCULAR; INTRAVENOUS; SUBCUTANEOUS
Status: DISCONTINUED | OUTPATIENT
Start: 2023-10-13 | End: 2023-10-14 | Stop reason: HOSPADM

## 2023-10-13 RX ORDER — FENTANYL CITRATE 50 UG/ML
INJECTION, SOLUTION INTRAMUSCULAR; INTRAVENOUS
Status: COMPLETED | OUTPATIENT
Start: 2023-10-13 | End: 2023-10-13

## 2023-10-13 RX ORDER — NALOXONE HYDROCHLORIDE 0.4 MG/ML
0.4 INJECTION, SOLUTION INTRAMUSCULAR; INTRAVENOUS; SUBCUTANEOUS
Status: DISCONTINUED | OUTPATIENT
Start: 2023-10-13 | End: 2023-10-14 | Stop reason: HOSPADM

## 2023-10-13 RX ORDER — LIDOCAINE HYDROCHLORIDE 20 MG/ML
SOLUTION OROPHARYNGEAL
Status: COMPLETED | OUTPATIENT
Start: 2023-10-13 | End: 2023-10-13

## 2023-10-13 RX ADMIN — TOPICAL ANESTHETIC 0.5 ML: 200 SPRAY DENTAL; PERIODONTAL at 11:36

## 2023-10-13 RX ADMIN — DIGOXIN 125 MCG: 125 TABLET ORAL at 08:49

## 2023-10-13 RX ADMIN — FENTANYL CITRATE 25 MCG: 50 INJECTION INTRAMUSCULAR; INTRAVENOUS at 11:42

## 2023-10-13 RX ADMIN — HEPARIN SODIUM AND DEXTROSE 1150 UNITS/HR: 10000; 5 INJECTION INTRAVENOUS at 08:09

## 2023-10-13 RX ADMIN — METHOHEXITAL SODIUM 50 MG: 500 INJECTION, POWDER, LYOPHILIZED, FOR SOLUTION INTRAMUSCULAR; INTRAVENOUS; RECTAL at 13:30

## 2023-10-13 RX ADMIN — Medication 15 MG/HR: at 01:53

## 2023-10-13 RX ADMIN — FENTANYL CITRATE 50 MCG: 50 INJECTION INTRAMUSCULAR; INTRAVENOUS at 11:38

## 2023-10-13 RX ADMIN — ATORVASTATIN CALCIUM 40 MG: 40 TABLET, FILM COATED ORAL at 20:10

## 2023-10-13 RX ADMIN — SODIUM CHLORIDE 100 ML/HR: 9 INJECTION, SOLUTION INTRAVENOUS at 11:19

## 2023-10-13 RX ADMIN — ATORVASTATIN CALCIUM 40 MG: 40 TABLET, FILM COATED ORAL at 00:26

## 2023-10-13 RX ADMIN — METOPROLOL TARTRATE 5 MG: 5 INJECTION INTRAVENOUS at 00:28

## 2023-10-13 RX ADMIN — MIDAZOLAM 1 MG: 1 INJECTION INTRAMUSCULAR; INTRAVENOUS at 11:37

## 2023-10-13 RX ADMIN — Medication 15 MG/HR: at 09:57

## 2023-10-13 RX ADMIN — METOPROLOL TARTRATE 25 MG: 25 TABLET, FILM COATED ORAL at 08:49

## 2023-10-13 RX ADMIN — HEPARIN SODIUM AND DEXTROSE 1150 UNITS/HR: 10000; 5 INJECTION INTRAVENOUS at 11:53

## 2023-10-13 RX ADMIN — LIDOCAINE HYDROCHLORIDE 15 ML: 20 SOLUTION ORAL; TOPICAL at 11:36

## 2023-10-13 RX ADMIN — LISINOPRIL 10 MG: 5 TABLET ORAL at 08:50

## 2023-10-13 RX ADMIN — APIXABAN 5 MG: 5 TABLET, FILM COATED ORAL at 20:10

## 2023-10-13 RX ADMIN — DIGOXIN 125 MCG: 125 TABLET ORAL at 02:05

## 2023-10-13 RX ADMIN — METOPROLOL TARTRATE 25 MG: 25 TABLET, FILM COATED ORAL at 20:11

## 2023-10-13 RX ADMIN — FENTANYL CITRATE 25 MCG: 50 INJECTION INTRAMUSCULAR; INTRAVENOUS at 11:39

## 2023-10-13 RX ADMIN — Medication 81 MG: at 08:49

## 2023-10-13 ASSESSMENT — ACTIVITIES OF DAILY LIVING (ADL)
ADLS_ACUITY_SCORE: 35

## 2023-10-13 ASSESSMENT — ENCOUNTER SYMPTOMS: DYSRHYTHMIAS: 1

## 2023-10-13 NOTE — H&P
[facility]    History and Physical - Hospitalist Service       Date of Admission:  10/12/2023    Assessment & Plan      Lan Manuel is a 62M transferred from [facility] for atrial fibrillation with RVR, heart failure; pmhx includes HTN, alcohol/tobacco dependence; admitted from [place] for atrial fibrillation with rapid response, compensated heart failure.    #atrial fibrillation with rapid response  #heart failure, 20% ejection fraction  Compensated heart failure, suspected hypertensive/dilated cardiomyopathy likely, though possible ischemic compounding etiologies. Relatively asymptomatic.  -telemetry  -EKG PRN  -troponin trend (if new symptoms/tele changes)  -A1c  -lipid panel  -ASA 81mg PO every day  -metoprolol 25mg PO BID  -metoprolol 5mg IV PRN (HR >110)  -lisinopril 10mg PO qD  -digoxin load, then 125mcg PO every day  -diltiazem gtt, wean to off as able  -atorvastatin 40mg PO every day  -heparin gtt  -flu vaccines as available  -initiation nitrates as able  -initiation spironolactone as able  -Cardiology consultation (advanced RVR medications, possible C for ischemic disease, AICD consideration/planning)    #tobacco dependence  #alcohol dependence  1ppd cigarettes. Precontemplative stage, declines NRT or chantix. Discussed for 4 minutes on day of admission/transfer. 12 beers/day usual ingestions, no previous withdrawal, last ingestion was 3 days prior to transfer. Precontemplative stage of alcohol reduction.  -discuss chantix, NRT, prior to discharge  -discuss methotrexate prior to discharge    #HTN  -lisinopril 10mg PO every day  -hydralazine 10mg IV PRN  -diltiazem gtt (wean to off as able)              Diet: Combination Diet Low Saturated Fat Na <2400mg Diet, No Caffeine Diet    DVT Prophylaxis: therapeutic heparin  Hernandez Catheter: Not present  Lines: None     Cardiac Monitoring: ACTIVE order. Indication: Tachyarrhythmias, acute (48 hours)  Code Status: Full Code   "    Clinically Significant Risk Factors Present on Admission                  # Hypertension: Noted on problem list  # Chronic heart failure with reduced ejection fraction: last echo with EF <40%     # Overweight: Estimated body mass index is 26.95 kg/m  as calculated from the following:    Height as of 10/11/23: 1.727 m (5' 8\").    Weight as of an earlier encounter on 10/12/23: 80.4 kg (177 lb 4 oz).              Disposition Plan      Expected Discharge Date: 10/14/2023                  Andre Webb MD  Hospitalist Service  [facility]  Securely message with [employer] (more info)  Text page via [facility] Paging/Directory     ______________________________________________________________________    Chief Complaint   Atrial fibrillation, heart failure    History is obtained from the patient    History of Present Illness   Lan Manuel is a 62M transferred from [facility] for atrial fibrillation with RVR, heart failure; pmhx includes HTN, alcohol/tobacco dependence; admitted from [place] for atrial fibrillation with rapid response, compensated heart failure.    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  Presented to PCP for refill of blood pressure medication, was found to be in atrial fibrillation with rapid responses, sent to [facility] for evaluation and treatment, was found to also have heart failure with ejection fraction 20-25%.  Rate control was attempted and metoprolol and was limited by hypotension, transition to diltiazem drip; TTE revealed heart failure of 20-25%, cardiologist requested transfer to higher level of cardiology care.    Chronic 1 pack/day tobacco use, estimated pack years of 40; 12 pack beer daily estimated for 20-30 years.    Denies palpitations, chest pain, dyspnea, headache, lightheadedness/dizziness, vision changes nausea/vomiting.    Past Medical History    Past Medical History: "   Diagnosis Date    Atrial fibrillation with RVR (H) 10/10/2023    Hypertension 8/9/2015       Past Surgical History   Past Surgical History:   Procedure Laterality Date    COLONOSCOPY N/A 3/2/2021    Procedure: COLONOSCOPY, WITH POLYPECTOMY AND BIOPSY;  Surgeon: Francis Chow MD;  Location: [facility]    IR LUMBAR EPIDURAL STEROID INJECTION  1/30/2007       Prior to Admission Medications   Prior to Admission Medications   Prescriptions Last Dose Informant Patient Reported? Taking?   Multiple Vitamins-Minerals (MULTIVITAMIN ADULTS 50+ PO)  Self Yes No   Sig: Take 1 tablet by mouth daily   Omega-3 Fatty Acids (FISH OIL) 1200 MG capsule  Self Yes No   Sig: Take 1,200 mg by mouth daily   calcium carbonate (OS-EROS) 600 MG tablet  Self Yes No   Sig: Take 600 mg by mouth daily   cephALEXin (KEFLEX) 500 MG capsule  Self Yes No   Sig: Take 500 mg by mouth 4 times daily   cholecalciferol (VITAMIN D3) 10 mcg (400 units) TABS tablet  Self Yes No   Sig: Take 1,000 Units by mouth daily   lisinopril (ZESTRIL) 10 MG tablet  Self No No   Sig: TAKE 1 TABLET (10 MG) BY MOUTH DAILY   Patient taking differently: Take 10 mg by mouth daily      Facility-Administered Medications: None        Review of Systems    The 10 point Review of Systems is negative other than noted in the HPI or here.      Physical Exam   Vital Signs:                    Weight: 0 lbs 0 oz    Constitutional: awake, alert, cooperative, no apparent distress, and appears stated age  Respiratory: CTAB  Cardiovascular: tachycardic, no m/g/r; no peripheral edema  GI: soft, nontender, nondistended  Musculoskeletal: shoulder/elbow flexion/extension 5/5 bilaterally and symmetric, ambulatory  Neurologic: AOx4, no focal deficits    Medical Decision Making       50 MINUTES SPENT BY ME on the date of service doing chart review, history, exam, documentation & further activities per the note.  MANAGEMENT DISCUSSED with the following over the past 24 hours: patient   NOTE(S)/MEDICAL  RECORDS REVIEWED over the past 24 hours: prior cardiology consult notes, outpatient FM  Tests ORDERED & REVIEWED in the past 24 hours:  - See lab/imaging results included in the data section of the note      Data     I have personally reviewed the following data over the past 24 hrs:    13.1 (H)  \   16.0   / 173     N/A N/A N/A /  142 (H)   N/A N/A N/A \     Trop: N/A BNP: N/A     TSH: N/A T4: N/A A1C: 6.2 (H)       Imaging results reviewed over the past 24 hrs:   No results found for this or any previous visit (from the past 24 hour(s)).

## 2023-10-13 NOTE — ANESTHESIA CARE TRANSFER NOTE
Patient: Lan Castellanoslante    Procedure: * No procedures listed *  Cardioversion External    Diagnosis: * No pre-op diagnosis entered *  Diagnosis Additional Information: No value filed.    Anesthesia Type:   General     Note:    Oropharynx: oropharynx clear of all foreign objects  Level of Consciousness: drowsy  Oxygen Supplementation: face mask  Level of Supplemental Oxygen (L/min / FiO2): 6  Independent Airway: airway patency satisfactory and stable  Dentition: dentition unchanged  Vital Signs Stable: post-procedure vital signs reviewed and stable  Report to RN Given: handoff report given  Patient transferred to: ICU    ICU Handoff: Call for PAUSE to initiate/utilize ICU HANDOFF, Identified Patient, Identified Responsible Provider, Reviewed the Pertinent Medical History, Discussed Surgical Course, Reviewed Intra-OP Anesthesia Management and Issues during Anesthesia, Set Expectations for Post Procedure Period and Allowed Opportunity for Questions and Acknowledgement of Understanding    Vitals:  Vitals Value Taken Time   /69    Temp     Pulse 65    Resp 14    SpO2 100        Electronically Signed By: VICKIE Toscano CRNA  October 13, 2023  1:35 PM

## 2023-10-13 NOTE — PROGRESS NOTES
Transfer/Discharge Note  Data:   Reason for Transport:  Higher level of care for [facility]    Lan Manuel was transferred to  [place]  via advanced life support (ALS) at 2130.  Patient was accompanied by Emergency Medical Services. Equipment used for transport: Cardiac monitor , Pulse oximeter, Blood pressure monitor, and IV pump. Family was aware of reason for transport: yes. All belongings sent with Patient.    Action:  Report: given to EMT and medic who verbalized understanding of transfer.  Report given by Lacey JACKSON    Response:  Patient's condition when transferred was stable.    Mary Pride RN

## 2023-10-13 NOTE — PROCEDURES
[facility]    Procedure: EP Cardioversion External    Date/Time: 10/13/2023 1:36 PM    Performed by: Eusebio Carney MD  Authorized by: Eusebio Carney MD      UNIVERSAL PROTOCOL   Site Marked: NA  Prior Images Obtained and Reviewed:  Yes  Required items: Required blood products, implants, devices and special equipment available    Patient identity confirmed:  Arm band, verbally with patient and hospital-assigned identification number  Patient was reevaluated immediately before administering moderate or deep sedation or anesthesia  Confirmation Checklist:  Patient's identity using two indicators, relevant allergies, procedure was appropriate and matched the consent or emergent situation and correct equipment/implants were available  Time out: Immediately prior to the procedure a time out was called    Universal Protocol: the Joint Commission Universal Protocol was followed       ANESTHESIA    Anesthesia was administered and monitored by anesthesiology.  See anesthesia documentation for details.    SEDATION  Patient Sedated: Yes    Sedation Type:  Deep  Vital signs: Vital signs monitored during sedation      PROCEDURE DETAILS  Cardioversion basis: elective  Indications: failure of anti-arrhythmic medications  Pre-procedure rhythm: atrial fibrillation  Patient position: patient was placed in a supine position  Chest area: chest area exposed  Electrodes: pads  Electrodes placed: anterior-posterior  Number of attempts: 1    Details of Attempts:  200 J converted with sinus pause and a few junctional beats before going back in nsr  Post-procedure rhythm: normal sinus rhythm      PROCEDURE    Patient Tolerance:  Patient tolerated the procedure well with no immediate complications

## 2023-10-13 NOTE — CONSULTS
[facility]    Cardiology Consultation     Date of Admission:  10/10/2023    Review of the result(s) of each unique test - Last ECG echocardiogram CBC BMP.     Assessment & Plan   Lan Manuel is a 62 year old male who was admitted on 10/10/2023.    1.  Acute exacerbation of systolic heart failure-NYHA class III-4  2.  New cardiomyopathy-possibly tachycardia mediated versus ischemia versus EtOH  3.  Atrial fibrillation/flutter   4.  Alcohol abuse-drinks 12 pack of beer per day  5.  Tobacco use-1 pack/day    Recommendation;   1.  Patient appears severely ill.  Ejection fraction is severely reduced and he is in acute exacerbation of systolic heart failure.  He has pulmonary edema on physical examination.  I will start him on Lasix 40 mg IV every 8 hours.  2.  I would continue diltiazem drip to keep his resting heart rate less than 110 bpm.  I will also start him on digoxin   3.  I will have him transferred to a higher facility for higher level of care and DANA guided cardioversion and possible ischemic work up.   4.  He is currently on 10 mg of lisinopril and since he has good blood pressure room I will continue this. May discontinue if BP room is required for uptitration on AVN blockade.   5.  Will cautiously transition from Dilt drip to po metoprolol in the setting of acute CHF exacerbation.    6.  I will start him on heparin drip.  I discussed the risks and benefits of this with the patient and his wife in detail.    Critical Care: Total critical care time spent: 90    RAMY Storey  Staff Cardiologist  [facility]    History of Present Illness   Lan Manuel is a 62 year old male on whom cardiology has been consulted urgently for management of acute heart failure exacerbation and atrial arrhythmia.  The patient has history of alcohol abuse, hypertension, tobacco use and to see his primary care physician for regular follow-up for hypertension.  He was noted to be  tachycardic with an irregular heart rate and hence was sent to the emergency department for further work-up. He also has shortness of breath on lying flat  And has been using multiple pillows.  His wife tells me that he has not felt like himself for the past 6 to 7 months but was reluctant to see a doctor when the symptoms started.  He is not aware of his heartbeat and has never had palpitations or chest pain.     In the emergency department they noted him to be in atrial fibrillation with heart rate in 140s-150s.  He was then started on diltiazem drip which converted him back to sinus rhythm briefly however he then went into atrial flutter/fibrillation and has been in that rhythm ever since.  A transthoracic echocardiogram was performed which showed severely reduced LV systolic function and ejection fraction of 25%,  LV apex and septum appear specially hypokinetic and the septum also appears thinned out.  He has not been getting diuretics.  Blood work shows normal creatinine.  BNP is not available.  Heart rate has improved and is now in the 110s.    Past Medical History   Past Medical History:   Diagnosis Date    Atrial fibrillation with RVR (H) 10/10/2023    Hypertension 8/9/2015       Past Surgical History   Past Surgical History:   Procedure Laterality Date    COLONOSCOPY N/A 3/2/2021    Procedure: COLONOSCOPY, WITH POLYPECTOMY AND BIOPSY;  Surgeon: Francis Chow MD;  Location: [facility]    IR LUMBAR EPIDURAL STEROID INJECTION  1/30/2007       Prior to Admission Medications   Prior to Admission Medications   Prescriptions Last Dose Informant Patient Reported? Taking?   Multiple Vitamins-Minerals (MULTIVITAMIN ADULTS 50+ PO) 10/10/2023 at am Self Yes Yes   Sig: Take 1 tablet by mouth daily   Omega-3 Fatty Acids (FISH OIL) 1200 MG capsule 10/10/2023 at am Self Yes Yes   Sig: Take 1,200 mg by mouth daily   calcium carbonate (OS-EROS) 600 MG tablet 10/10/2023 at am Self Yes Yes   Sig: Take 600 mg by mouth daily    cephALEXin (KEFLEX) 500 MG capsule 10/10/2023 at 1200 Self Yes Yes   Sig: Take 500 mg by mouth 4 times daily   cholecalciferol (VITAMIN D3) 10 mcg (400 units) TABS tablet 10/10/2023 at am Self Yes Yes   Sig: Take 1,000 Units by mouth daily   lisinopril (ZESTRIL) 10 MG tablet 10/10/2023 at am Self No Yes   Sig: TAKE 1 TABLET (10 MG) BY MOUTH DAILY   Patient taking differently: Take 10 mg by mouth daily      Facility-Administered Medications: None     Current Facility-Administered Medications   Medication Dose Route Frequency    [START ON 10/13/2023] digoxin  125 mcg Oral Daily    folic acid  1 mg Oral Daily    furosemide  40 mg Intravenous Daily    [START ON 10/13/2023] lisinopril  10 mg Oral Daily    multivitamin w/minerals  1 tablet Oral Daily    thiamine  100 mg Oral Daily     Current Facility-Administered Medications   Medication Last Rate    diltiazem (CARDIZEM) 125 mg in sodium chloride 0.9 % 125 mL infusion 10 mg/hr (10/12/23 1800)    heparin 1,000 Units/hr (10/12/23 1816)     Allergies   No Known Allergies    Social History    reports that he has been smoking cigarettes. He has never used smokeless tobacco. He reports current alcohol use. He reports current drug use. Drug: Marijuana.    Family History   I have reviewed this patient's family history and updated it with pertinent information if needed.  Family History   Problem Relation Age of Onset    Myocardial Infarction Mother 37         of MI at 37    No Known Problems Father     Heart Disease Mother 37.00    Allergies Father           Review of Systems   A comprehensive review of system was performed and is negative other than that noted in the HPI or here.     Physical Exam   Vital Signs with Ranges  Temp:  [97.8  F (36.6  C)-98.6  F (37  C)] 98.2  F (36.8  C)  Pulse:  [] 134  Resp:  [11-28] 18  BP: ()/() 136/92  SpO2:  [90 %-98 %] 98 %  Wt Readings from Last 4 Encounters:   10/12/23 83.4 kg (183 lb 13.8 oz)   10/10/23 82.7 kg  "(182 lb 4 oz)   01/19/22 80.7 kg (178 lb)   03/02/21 81.6 kg (180 lb)     I/O last 3 completed shifts:  In: 2001.5 [P.O.:450; I.V.:1551.5]  Out: 2850 [Urine:2850]      Vitals: BP (!) 136/92   Pulse (!) 134   Temp 98.2  F (36.8  C) (Oral)   Resp 18   Ht 1.727 m (5' 8\")   Wt 83.4 kg (183 lb 13.8 oz)   SpO2 98%   BMI 27.96 kg/m      Physical Exam:   General - Alert and in no acute distress  Respiratory - B/l crackles  Cardiovascular - IRR, S1 s2 normal, systolic murmur  Gastrointestinal - Non distended  Psych - Appropriate affect     No lab results found in last 7 days.    Invalid input(s): \"TROPONINIES\"    Recent Labs   Lab 10/12/23  1652 10/12/23  0230 10/11/23  0543 10/10/23  1620   WBC 9.9  --  6.0 6.7   HGB 15.9  --  14.3 15.4   *  --  102* 102*     --  153 193   INR  --   --   --  1.02   NA  --   --  138 140   POTASSIUM  --   --  4.3 4.7   CHLORIDE  --   --  107 103   CO2  --   --  22 28   BUN  --   --  15.7 18.2   CR  --   --  0.90 0.97   GFRESTIMATED  --   --  >90 89   ANIONGAP  --   --  9 9   EROS  --   --  9.0 10.1   GLC  --  142* 121* 108*   ALBUMIN  --   --  3.8 4.5   PROTTOTAL  --   --  5.9* 7.0   BILITOTAL  --   --  1.2 1.1   ALKPHOS  --   --  67 77   ALT  --   --  42 33   AST  --   --  38 33     Recent Labs   Lab Test 02/06/19  1518 05/22/17  1550   CHOL 186 180   HDL 67 65   LDL 98 93   TRIG 105 112     Recent Labs   Lab 10/12/23  1652 10/11/23  0543 10/10/23  1620   WBC 9.9 6.0 6.7   HGB 15.9 14.3 15.4   HCT 47.3 42.6 45.7   * 102* 102*    153 193     No results for input(s): \"PH\", \"PHV\", \"PO2\", \"PO2V\", \"SAT\", \"PCO2\", \"PCO2V\", \"HCO3\", \"HCO3V\" in the last 168 hours.  No results for input(s): \"NTBNPI\", \"NTBNP\" in the last 168 hours.  No results for input(s): \"DD\" in the last 168 hours.  No results for input(s): \"SED\", \"CRP\" in the last 168 hours.  Recent Labs   Lab 10/12/23  1652 10/11/23  0543 10/10/23  1620    153 193     Recent Labs   Lab 10/10/23  1620   TSH " "2.49     No results for input(s): \"COLOR\", \"APPEARANCE\", \"URINEGLC\", \"URINEBILI\", \"URINEKETONE\", \"SG\", \"UBLD\", \"URINEPH\", \"PROTEIN\", \"UROBILINOGEN\", \"NITRITE\", \"LEUKEST\", \"RBCU\", \"WBCU\" in the last 168 hours.    Imaging:  Recent Results (from the past 48 hour(s))   Echocardiogram Complete   Result Value    LVEF  20-25%    Narrative    [id number]  [id number]  [id number]  [id number]^CLAUDIA^ELVIRA^DUSTIN     [facility]  Echocardiography Laboratory  [address] [place] [address]     Name: AUDREY SHI  MRN: [id number]  : 1960  Study Date: 10/11/2023 10:51 AM  Age: 62 yrs  Gender: Male  Patient Location: [facility]  Reason For Study: Atrial Fibrillation  Ordering Physician: ELVIRA TAYLOR  Referring Physician: Jenniffer Matt  Performed By: Agnes Carballo RDCS     BSA: 2.0 m2  Height: 68 in  Weight: 182 lb  HR: 69  BP: 108/87 mmHg  ______________________________________________________________________________  Procedure  Complete Portable Echo Adult. Optison (NDC #[id number]) given intravenously.  ______________________________________________________________________________  Interpretation Summary     Left ventricular systolic function is severely reduced.The visual ejection  fraction is 20-25%.There is severe global hypokinesia of the left ventricle  with some regionality with basal inferolatreal wall mars the best.  Moderately decreased right ventricular systolic function  Moderate bi-atrial enlargement.  There is mild (1+) tricuspid regurgitation.  IVC diameter >2.1 cm collapsing <50% with sniff suggests a high RA pressure  estimated at 15 mmHg or greater.  The rhythm was atrial fibrillation.  ______________________________________________________________________________  Left Ventricle  The left ventricle is normal in size. There is normal left ventricular wall  thickness. Diastolic Doppler findings (E/E' ratio and/or other parameters)  suggest left ventricular filling " pressures are indeterminate. Left ventricular  systolic function is severely reduced. The visual ejection fraction is 20-25%.  There is severe global hypokinesia of the left ventricle. with some  regionality with basal inferolatreal wall mars the best.     Right Ventricle  The right ventricle is mildly dilated. Moderately decreased right ventricular  systolic function.     Atria  The left atrium is moderately dilated. The right atrium is moderately dilated.  There is no color Doppler evidence of an atrial shunt.     Mitral Valve  There is trace to mild mitral regurgitation.     Tricuspid Valve  There is mild (1+) tricuspid regurgitation. The right ventricular systolic  pressure is approximated at 17.9 mmHg plus the right atrial pressure.     Aortic Valve  There is mild trileaflet aortic sclerosis. There is trace aortic  regurgitation. No aortic stenosis is present.     Pulmonic Valve  The pulmonic valve is not well visualized. There is no pulmonic valvular  stenosis.     Vessels  The aortic root is normal size. Normal size ascending aorta. IVC diameter >2.1  cm collapsing <50% with sniff suggests a high RA pressure estimated at 15 mmHg  or greater.     Pericardium  There is no pericardial effusion.     Rhythm  The rhythm was atrial fibrillation.  ______________________________________________________________________________  MMode/2D Measurements & Calculations  IVSd: 0.99 cm     LVIDd: 4.9 cm  LVIDs: 4.4 cm  LVPWd: 0.97 cm  FS: 10.3 %  LV mass(C)d: 172.8 grams  LV mass(C)dI: 88.0 grams/m2  Ao root diam: 3.0 cm  LA dimension: 4.8 cm  asc Aorta Diam: 3.3 cm  LA/Ao: 1.6  Ao root diam index Ht(cm/m): 1.7  Ao root diam index BSA (cm/m2): 1.5  Asc Ao diam index BSA (cm/m2): 1.7  Asc Ao diam index Ht(cm/m): 1.9  LA Volume (BP): 79.6 ml     LA Volume Index (BP): 40.6 ml/m2  RV Base: 4.0 cm  RWT: 0.39     Doppler Measurements & Calculations  MV E max duarte: 93.7 cm/sec  MV dec time: 0.12 sec  TR max duarte: 211.7  cm/sec  TR max P.9 mmHg  E/E' av.5  Lateral E/e': 6.9  Medial E/e': 10.2     ______________________________________________________________________________  Report approved by: Stephanie Zendejas 10/11/2023 01:12 PM             Echo:  No results found for this or any previous visit (from the past 4320 hour(s)).      This note was completed in part using dictation via the Dragon voice recognition software. Some word and grammatical errors may occur and must be interpreted in the appropriate clinical context.  If there are any questions pertaining to this issue, please contact me for further clarification.

## 2023-10-13 NOTE — PROGRESS NOTES
Cardiology Progress Note    Assessment:  Atrial fibrillation with improved rate control, newly documented  Dilated cardiomyopathy presumed tachycardia induced, severely decreased LV systolic function, no overt fluid overload but elevated BNP and CVP  History of heavy alcohol use      Plan:  Continue current cardiac medications  DANA guided cardioversion  EP consult  Should have alcohol counseling    Reassess LV function after restoration of normal sinus rhythm.  Further evaluation may be needed if LV systolic function remains depressed    Subjective:   Denies chest pain shortness of breath or heart palpitation today  Was transferred from [facility] yesterday.    Objective:   /67 (BP Location: Right arm)   Pulse 103   Temp 98  F (36.7  C) (Oral)   Resp 18   Wt 79.3 kg (174 lb 14.4 oz)   SpO2 92%   BMI 26.59 kg/m      Intake/Output Summary (Last 24 hours) at 10/13/2023 0830  Last data filed at 10/13/2023 0115  Gross per 24 hour   Intake --   Output 850 ml   Net -850 ml         Physical Exam:  GENERAL: no distress  NECK: No JVD  LUNGS: Clear to auscultation.  CARDIAC: irregular  rhythm, S1 & S2 normal.  No heaves, thrills, gallops or murmurs.  ABDOMEN: flat, negative hepatosplenomegaly, soft and non-tender.  EXTREMITIES: No evidence of cyanosis, clubbing or edema.    Current Facility-Administered Medications Ordered in Epic   Medication Dose Route Frequency Provider Last Rate Last Admin    acetaminophen (TYLENOL) tablet 650 mg  650 mg Oral Q6H PRN Andre Webb MD        Or    acetaminophen (TYLENOL) Suppository 650 mg  650 mg Rectal Q6H PRN Andre Webb MD        aspirin EC tablet 81 mg  81 mg Oral Daily Andre Webb MD        atorvastatin (LIPITOR) tablet 40 mg  40 mg Oral QPM Andre Webb MD   40 mg at 10/13/23 0026    [START ON 10/14/2023] digoxin (LANOXIN) tablet 125 mcg  125 mcg Oral Daily Andre Webb MD        digoxin (LANOXIN) tablet 125 mcg  125 mcg  Oral Q6H Andre Webb MD   125 mcg at 10/13/23 0205    diltiazem (CARDIZEM) 125 mg in dextrose 5 % 125 mL infusion  5-15 mg/hr Intravenous Continuous Andre Webb MD 15 mL/hr at 10/13/23 0153 15 mg/hr at 10/13/23 0153    heparin infusion 25,000 units in D5W 250 mL ANTICOAGULANT  0-5,000 Units/hr Intravenous Continuous Andre Webb MD 11.5 mL/hr at 10/13/23 0809 1,150 Units/hr at 10/13/23 0809    hydrALAZINE (APRESOLINE) injection 10 mg  10 mg Intravenous Q6H PRN Andre Webb MD        lisinopril (ZESTRIL) tablet 10 mg  10 mg Oral Daily Andre Webb MD        melatonin tablet 1 mg  1 mg Oral At Bedtime PRN Andre Webb MD        metoprolol (LOPRESSOR) injection 5 mg  5 mg Intravenous Q5 Min PRN Andre Webb MD   5 mg at 10/13/23 0028    metoprolol tartrate (LOPRESSOR) tablet 25 mg  25 mg Oral BID Andre Webb MD        ondansetron (ZOFRAN ODT) ODT tab 4 mg  4 mg Oral Q6H PRN Andre Webb MD        Or    ondansetron (ZOFRAN) injection 4 mg  4 mg Intravenous Q6H PRN Andre Webb MD        prochlorperazine (COMPAZINE) injection 10 mg  10 mg Intravenous Q6H PRN Andre Webb MD        Or    prochlorperazine (COMPAZINE) tablet 10 mg  10 mg Oral Q6H PRN Andre Webb MD        Or    prochlorperazine (COMPAZINE) suppository 25 mg  25 mg Rectal Q12H PRN Andre Webb MD         No current [facility]-ordered outpatient medications on file.       Cardiographics:    Telemetry: A-fib rate 8220  ECG: A-fib no Q waves no ischemic changes  Echocardiogram:   Left ventricular systolic function is severely reduced.The visual ejection  fraction is 20-25%.There is severe global hypokinesia of the left ventricle  with some regionality with basal inferolatreal wall mars the best.  Moderately decreased right ventricular systolic function  Moderate bi-atrial enlargement.  There is mild (1+) tricuspid regurgitation.  IVC diameter >2.1 cm  "collapsing <50% with sniff suggests a high RA pressure  estimated at 15 mmHg or greater.  The rhythm was atrial fibrillation.     Lab Results    Chemistry/lipid CBC Cardiac Enzymes/BNP/TSH/INR   Recent Labs   Lab Test 10/13/23  0000   CHOL 147   HDL 60   LDL 65   TRIG 108     Recent Labs   Lab Test 10/13/23  0000 02/06/19  1518 05/22/17  1550   LDL 65 98 93     Recent Labs   Lab Test 10/13/23  0622      POTASSIUM 3.9   CHLORIDE 104   CO2 23   *   BUN 9.6   CR 0.87   GFRESTIMATED >90   EROS 9.0     Recent Labs   Lab Test 10/13/23  0622 10/11/23  0543 10/10/23  1620   CR 0.87 0.90 0.97     Recent Labs   Lab Test 10/13/23  0000 02/06/19  1518 05/22/17  1550   A1C 6.2* 5.5 5.7          Recent Labs   Lab Test 10/13/23  0622   WBC 12.5*   HGB 15.1   HCT 45.0           Recent Labs   Lab Test 10/13/23  0622 10/13/23  0000 10/12/23  1652   HGB 15.1 16.0 15.9    No results for input(s): \"TROPONINI\" in the last 01227 hours.  Recent Labs   Lab Test 10/11/23  0543   NTBNPI 1,449*     Recent Labs   Lab Test 10/10/23  1620   TSH 2.49     Recent Labs   Lab Test 10/10/23  1620   INR 1.02                   "

## 2023-10-13 NOTE — ANESTHESIA PREPROCEDURE EVALUATION
Anesthesia Pre-Procedure Evaluation    Patient: Lan Manuel   MRN: [id number] : 1960        Procedure : * No procedures listed *  Cardioversion External       Past Medical History:   Diagnosis Date    Atrial fibrillation with RVR (H) 10/10/2023    Hypertension 2015      Past Surgical History:   Procedure Laterality Date    COLONOSCOPY N/A 3/2/2021    Procedure: COLONOSCOPY, WITH POLYPECTOMY AND BIOPSY;  Surgeon: Francis Chow MD;  Location: [facility]    IR LUMBAR EPIDURAL STEROID INJECTION  2007      No Known Allergies   Social History     Tobacco Use    Smoking status: Every Day     Packs/day: 1     Types: Cigarettes    Smokeless tobacco: Never   Substance Use Topics    Alcohol use: Yes     Comment:       Wt Readings from Last 1 Encounters:   10/13/23 79.3 kg (174 lb 14.4 oz)        Anesthesia Evaluation   Pt has had prior anesthetic.         ROS/MED HX  ENT/Pulmonary:  - neg pulmonary ROS     Neurologic:  - neg neurologic ROS     Cardiovascular:     (+)  hypertension- -   -  - -                        dysrhythmias, a-fib,             METS/Exercise Tolerance: >4 METS    Hematologic:  - neg hematologic  ROS     Musculoskeletal:  - neg musculoskeletal ROS     GI/Hepatic:  - neg GI/hepatic ROS     Renal/Genitourinary:  - neg Renal ROS     Endo:  - neg endo ROS     Psychiatric/Substance Use:  - neg psychiatric ROS     Infectious Disease:  - neg infectious disease ROS     Malignancy:  - neg malignancy ROS     Other:  - neg other ROS          Physical Exam    Airway        Mallampati: II   TM distance: > 3 FB   Neck ROM: full   Mouth opening: > 3 cm    Respiratory Devices and Support         Dental  no notable dental history     (+) Minor Abnormalities - some fillings, tiny chips      Cardiovascular          Rhythm and rate: irregular and normal     Pulmonary           breath sounds clear to auscultation           OUTSIDE LABS:  CBC:   Lab Results   Component Value Date    WBC 12.5 (H)  "10/13/2023    WBC 13.1 (H) 10/13/2023    HGB 15.1 10/13/2023    HGB 16.0 10/13/2023    HCT 45.0 10/13/2023    HCT 47.2 10/13/2023     10/13/2023     10/13/2023     BMP:   Lab Results   Component Value Date     10/13/2023     10/11/2023    POTASSIUM 3.9 10/13/2023    POTASSIUM 4.3 10/11/2023    CHLORIDE 104 10/13/2023    CHLORIDE 107 10/11/2023    CO2 23 10/13/2023    CO2 22 10/11/2023    BUN 9.6 10/13/2023    BUN 15.7 10/11/2023    CR 0.87 10/13/2023    CR 0.90 10/11/2023     (H) 10/13/2023     (H) 10/12/2023     COAGS:   Lab Results   Component Value Date    PTT 27 10/10/2023    INR 1.02 10/10/2023     POC: No results found for: \"BGM\", \"HCG\", \"HCGS\"  HEPATIC:   Lab Results   Component Value Date    ALBUMIN 3.8 10/11/2023    PROTTOTAL 5.9 (L) 10/11/2023    ALT 42 10/11/2023    AST 38 10/11/2023    ALKPHOS 67 10/11/2023    BILITOTAL 1.2 10/11/2023     OTHER:   Lab Results   Component Value Date    LACT 0.9 01/14/2021    A1C 6.2 (H) 10/13/2023    EROS 9.0 10/13/2023    PHOS 4.6 (H) 10/10/2023    MAG 2.0 10/10/2023    LIPASE 204 01/14/2021    TSH 2.49 10/10/2023       Anesthesia Plan    ASA Status:  3    NPO Status:  NPO Appropriate    Anesthesia Type: General.     - Airway: Mask Only   Induction: Intravenous.   Maintenance: TIVA.        Consents    Anesthesia Plan(s) and associated risks, benefits, and realistic alternatives discussed. Questions answered and patient/representative(s) expressed understanding.     - Discussed:     - Discussed with:  Patient       - Patient is DNR/DNI Status: No          Postoperative Care            Comments:    Other Comments: Mask GA with Methohexitol            Gustavo Keita MD  "

## 2023-10-13 NOTE — PHARMACY-ADMISSION MEDICATION HISTORY
Admission medication history completed at [facility]. Please see Medication Scribe Admission Medication History note from 10/11/2023.

## 2023-10-13 NOTE — PHARMACY-ADMISSION MEDICATION HISTORY
Admission medication history completed at [facility]. Please see Medication Scribe Admission Medication History note from 10/11/2023.

## 2023-10-13 NOTE — SEDATION DOCUMENTATION
DANA complete. NPO until 1PM or after cardioversion, no hot foods/liquids until after 6PM. Patient tolerated procedure well. Hypotension noted after DANA complete, NS bolus given. See MAR for sedation doses.

## 2023-10-13 NOTE — PRE-PROCEDURE
Preprocedure note for transesophageal echocardiogram:    Chart reviewed.  Dr. Carney's note from this morning also reviewed with clear lung fields.  Oral cavity inspected.  Partial view of tonsils.    Patient low risk for moderate sedation.

## 2023-10-13 NOTE — PROGRESS NOTES
Patient up in chair. Independent to BR. Patient now in bed, transferring to [facility] this evening. Denies pain. Temp 99.7 orally. BP and HR fluctuations noted. 139/104 one minute recheck-127/92. HR 80's to 140's. Diltiazem gtt @ 10mg/hr. Heparin infusion @ 1000 units/hr.  Temp: 99.7  F (37.6  C) Temp src: Oral BP: (!) 139/104 Pulse: (!) 145   Resp: 20 SpO2: 94 % O2 Device: None (Room air)

## 2023-10-13 NOTE — PROGRESS NOTES
[facility]    Medicine Progress Note - Hospitalist Service    Date of Admission:  10/12/2023    Assessment & Plan   Lan Manuel is a 62M transferred from [facility] for atrial fibrillation with RVR, heart failure; pmhx includes HTN, alcohol/tobacco dependence; admitted from [place] for atrial fibrillation with rapid response, compensated heart failure.    #Atrial fibrillation with rapid ventricular response  #HFrEF, severe with EF 30%  Compensated heart failure on exam. HFrEF a new diagnosis. Suspect most likely a tachyarrhythmia-mediated cardiomyopathy, but could be contributions of alcohol or ischemia as well. Relatively asymptomatic.  -Cardiology/EP consulted; appreciate expertise  -Underwent DANA without evidence of MINDI thrombus followed by synchronized cardioversion with successful return to NSR  -On heparin gtt on admission, transitioned to Eliquis 5mg BID  -A1c 6.2  -LDL 65; continue atorvastatin 40mg daily  -BB: Continue metoprolol tartrate 25mg PO BID  -ACE/ARB: Continue lisinopril 10mg PO qD  -Had begun digoxin load for rate contol; discontinued  -Dilt gtt off  -Will need follow up TTE to ensue resolution of systolic dysfunction with return to NSR, timing per cardiology. Ischemic eval consideration pending results.    #Tobacco dependence  #Alcohol dependence  1ppd cigarettes. Precontemplative stage, declines NRT or chantix. 12 beers/day usual ingestions, no previous withdrawal, last ingestion was 3 days prior to transfer. Precontemplative stage of alcohol reduction. Both habits could worsen cardiac function.  -Rediscuss if he would like NRT, Chantix, naltrexone, etc at discharge    #HTN  - Continue lisinopril    #Overweight          Diet: Low Saturated Fat Na <2400 mg    DVT Prophylaxis: DOAC  Hernandez Catheter: Not present  Lines: None     Cardiac Monitoring: ACTIVE order. Indication: Tachyarrhythmias, acute (48 hours)  Code Status: Full Code      Clinically Significant Risk Factors  "Present on Admission                  # Hypertension: Noted on problem list  # Chronic heart failure with reduced ejection fraction: last echo with EF <40%     # Overweight: Estimated body mass index is 26.59 kg/m  as calculated from the following:    Height as of 10/11/23: 1.727 m (5' 8\").    Weight as of this encounter: 79.3 kg (174 lb 14.4 oz).              Disposition Plan      Expected Discharge Date: 10/14/2023                    TARAS STYLES MD  Hospitalist Service  [facility]  Securely message with [employer] (more info)  Text page via [facility] Paging/Directory   ______________________________________________________________________    Interval History   Patient continues to feel well this morning, denies ever really feeling symptomatic from his fast heart rate.    Physical Exam   Vital Signs: Temp: 98  F (36.7  C) Temp src: Oral BP: 106/69 Pulse: 67   Resp: 21 SpO2: 90 % O2 Device: None (Room air) Oxygen Delivery: 2 LPM  Weight: 174 lbs 14.4 oz    General Appearance: NAD, well appearing  Respiratory: CTAB  Cardiovascular: Irregularly irregular rhythm, tachycardic. No m/r/g  GI: Soft. NT/ND  Skin: No peripheral edema  Other: No focal deficits    Medical Decision Making       45 MINUTES SPENT BY ME on the date of service doing chart review, history, exam, documentation & further activities per the note.      Data     I have personally reviewed the following data over the past 24 hrs:    12.5 (H)  \   15.1   / 156     137 104 9.6 /  127 (H)   3.9 23 0.87 \     Trop: N/A BNP: N/A     TSH: N/A T4: N/A A1C: 6.2 (H)       Imaging results reviewed over the past 24 hrs:   Recent Results (from the past 24 hour(s))   EP Cardioversion External    Narrative    Eusebio Carney MD     10/13/2023  1:38 PM  [facility]    Procedure: EP Cardioversion External    Date/Time: 10/13/2023 1:36 PM    Performed by: Eusebio Carney MD  Authorized by: Eusebio Carney MD      UNIVERSAL " PROTOCOL   Site Marked: NA  Prior Images Obtained and Reviewed:  Yes  Required items: Required blood products, implants, devices and special   equipment available    Patient identity confirmed:  Arm band, verbally with patient and   hospital-assigned identification number  Patient was reevaluated immediately before administering moderate or deep   sedation or anesthesia  Confirmation Checklist:  Patient's identity using two indicators, relevant   allergies, procedure was appropriate and matched the consent or emergent   situation and correct equipment/implants were available  Time out: Immediately prior to the procedure a time out was called    Universal Protocol: the Joint Commission Universal Protocol was followed       ANESTHESIA    Anesthesia was administered and monitored by anesthesiology.  See   anesthesia documentation for details.    SEDATION  Patient Sedated: Yes    Sedation Type:  Deep  Vital signs: Vital signs monitored during sedation      PROCEDURE DETAILS  Cardioversion basis: elective  Indications: failure of anti-arrhythmic medications  Pre-procedure rhythm: atrial fibrillation  Patient position: patient was placed in a supine position  Chest area: chest area exposed  Electrodes: pads  Electrodes placed: anterior-posterior  Number of attempts: 1    Details of Attempts:  200 J converted with sinus pause and a few   junctional beats before going back in nsr  Post-procedure rhythm: normal sinus rhythm      PROCEDURE    Patient Tolerance:  Patient tolerated the procedure well with no immediate   complications   Echocardiogram DANA   Result Value    LVEF  30-35%    Narrative    [id number]  [facility]  [id number]  [id number]^[place] [address]     Name: AUDREY SHI  MRN: [id number]  : 1960  Study Date: 10/13/2023 11:25 AM  Age: 62 yrs  Gender: Male  Patient Location: [facility]  Reason For Study: Arrhythmia  Ordering Physician: ROCHELLE  BRET  Referring Physician: SANJUANITA LIMON  Performed By: SHARON     BSA: 1.9 m2  Height: 68 in  Weight: 174 lb  HR: 114  BP: 102/75 mmHg  ______________________________________________________________________________  Procedure  Complete DANA Adult.  ______________________________________________________________________________  Interpretation Summary     The left ventricle is normal in size.  The visual ejection fraction is 30-35%.  There is moderate global hypokinesia of the left ventricle.  Mildly decreased right ventricular systolic function  Trabeculations in MINDI but no clear thrombus.  ______________________________________________________________________________  DANA  Consent to the procedure was obtained prior to sedation. Prior to the exam,  the oral cavity was checked and no overcrowding was noted. The transesophageal  probe was passed without difficulty. There were no complications associated  with this procedure. The procedure was performed in the Echo Lab. Patient was  sedated using Fentanyl 100 mcg. Patient was sedated using Versed 1 mg. The  heart rate, respiratory rate, oxygen saturations, blood pressure, and response  to care were monitored throughout the procedure with the assistance of the  nurse. I determined this patient to be an appropriate candidate for the  planned sedation and procedure and have reassessed the patient immediately  prior to sedation and procedure. Total sedation time: 16 minutes of continuous  bedside 1:1 monitoring.     Left Ventricle  The left ventricle is normal in size. The visual ejection fraction is 30-35%.  There is moderate global hypokinesia of the left ventricle.     Right Ventricle  Mildly decreased right ventricular systolic function.     Atria  No thrombus is detected in the left atrial appendage. Trabeculations in MINDI  but no clear thrombus.     Mitral Valve  Mitral valve leaflets appear normal. There is mild (1+) mitral regurgitation.  There is no mitral valve  stenosis.     Tricuspid Valve  Normal tricuspid valve. There is mild (1+) tricuspid regurgitation. The right  ventricular systolic pressure is approximated at 21mmHg plus the right atrial  pressure.     Aortic Valve  The aortic valve is trileaflet. There is mild (1+) aortic regurgitation. No  aortic stenosis is present.     Pulmonic Valve  The pulmonic valve is not well visualized. There is no pulmonic valvular  regurgitation.     Pericardial/Pleural  The pericardium appears normal.     Rhythm  The rhythm was atrial fibrillation with controlled ventricular rate at rest.  ______________________________________________________________________________  Doppler Measurements & Calculations  TR max duarte: 229.0 cm/sec  TR max P.0 mmHg     ______________________________________________________________________________  Report approved by: Stephanie Henry 10/13/2023 12:51 PM

## 2023-10-13 NOTE — PROVIDER NOTIFICATION
"Text page to Dr. Torres:  \"He is back from the cardioversion and EKG was just obtained. Abnormal EKG, some ischemia findings. You can review now in Epic.\"  "

## 2023-10-13 NOTE — DISCHARGE SUMMARY
[facility] Hospitalist Discharge Summary    Lan Manuel MRN# [id number]   Age: 62 year old YOB: 1960     Date of Admission:  10/11/2023  Date of Discharge::  10/12/2023  Admitting Physician:  Andre Webb MD  Discharge Physician:  Kitty Dunbar MD  Primary Physician: Jenniffer Matt  Transferring Facility: [facility]     Home clinic: unknown          Admission Diagnoses:   Atrial fibrillation with rapid ventricular response (H) [I48.91]          Discharge Diagnosis:     Principle diagnosis: Atrial fib/ flutter with RVR   Secondary diagnoses:  Patient Active Problem List   Diagnosis    Hypertension    Multiple rib fractures    Nicotine dependence    Left scapula fracture    Diverticulitis    Diverticulitis of colon    Appendicular diverticulum    Alcohol use    Adenomatous colon polyp    Ganglion    Atrial fibrillation with RVR (H)    Atrial fibrillation with rapid ventricular response (H)          Brief History of Presenting Illness:   As per admit hx  Lan Manuel is a 62 year old male with past medical history of alcohol abuse, HTN, tobacco use now presents on 10/10/2023 with tachycardia.      Patient was at follow up in clinic for hypertension and was noticed to have significant tachycardia. Appeared to be irregular rhythm so was sent to ER for further evaluation.      Patient denies any symptoms. Has a chronic cough which is unchanged from baseline. No new or changing sputum production. Denies fever, chills, lightheadedness, dizziness.   Denies chest pain. Denies heart palpitations. Patient felt like after getting diltiazem infusion he could feel his heart slow and felt a slight pressure, but denies any pain or crushing pressure and denies radiation to neck, back, or arms. Denies LE edema. Denies orthopnea or PND.      Denies any known history of heart arrhythmias.      Endorses drinking a 12 pack of beer every day. Last time he was sober was last time he was  in the hospital (Jan 2021). Denies any history of withdrawal symptoms. Denies current tremor, anxiety, cravings.   Also smokes about 1PPD. Declines nicotine patch    ECHO 10/11-Left ventricular systolic function is severely reduced.The visual ejection  fraction is 20-25%.There is severe global hypokinesia of the left ventricle  with some regionality with basal inferolatreal wall mars the best.  Moderately decreased right ventricular systolic function  Moderate bi-atrial enlargement.  There is mild (1+) tricuspid regurgitation.  IVC diameter >2.1 cm collapsing <50% with sniff suggests a high RA pressure  estimated at 15 mmHg or greater.  The rhythm was atrial fibrillation.         Hospital Course:   Atrial fib/ flutter with RVR   New onset, unclear duration PTA. Asymptomatic. Rates 140-150's in ER. Received multiple doses metoprolol IV without rate control. Eventually initiated on diltiazem drip, which controlled rate with slight initial dip in BP (asymptomatic, dropped to 80's/50's briefly with initiation of max dose dilt, then recovered). Titrated off dilt drip in 3 hrs, rate & BP remain stable.   [id number] score is 1 (HTN) so no anticoagulation started  at time of admission. TSH nl. Drug screen positive for cannabis  Pt continued on dilt gtt. Seen by cards and started on heparin gtt, digoxin and po metoprolol.  ECHO showed EF 20-25% with severe global hypokinesis. Cards thought pt could be in acute CHF exacerbation and started iv lasix. Pt transferred to [facility] for further eval/ rx.      Hypertension  Normotensive since presentation, with short episode of hypotension after fast up-titration of diltiazem infusion in ER. Recovered quickly without symptoms upon weaning off max-dose diltiazem infusion. Managed PTA with lisinopril.   BP stable 130-150s systolic.  Resumed  lisinopril 10mg/day on 10/12     Density of unclear significance on CXR  Chest Xray shows ovoid 2.4cm density overlying mid thoracic  spine. Most likely a prominent thoracic osteophyte, however un-enhanced chest CT is recommended by radiology to exclude pulmonary nodule.   -Chest CT order placed in discharge navigator for completion & ongoing follow up with PCP for further workup     Nicotine dependence  Smokes 1PPD. Declines nicotine patch.      Alcohol use  Drinks 12 pack of beer everyday. Denies history of withdrawal. No current withdrawal sxs at time of admission.   -continue CIWA with diazepam & oral vitamins. If he starts withdrawing, would start neurontin protocol.              Procedures:   No procedures performed during this admission         Allergies:    No Known Allergies          Medications Prior to Admission:     Medications Prior to Admission   Medication Sig Dispense Refill Last Dose    calcium carbonate (OS-EROS) 600 MG tablet Take 600 mg by mouth daily       cephALEXin (KEFLEX) 500 MG capsule Take 500 mg by mouth 4 times daily       cholecalciferol (VITAMIN D3) 10 mcg (400 units) TABS tablet Take 1,000 Units by mouth daily       lisinopril (ZESTRIL) 10 MG tablet TAKE 1 TABLET (10 MG) BY MOUTH DAILY (Patient taking differently: Take 10 mg by mouth daily) 90 tablet 3     Multiple Vitamins-Minerals (MULTIVITAMIN ADULTS 50+ PO) Take 1 tablet by mouth daily       Omega-3 Fatty Acids (FISH OIL) 1200 MG capsule Take 1,200 mg by mouth daily                Discharge Medications:     Current Discharge Medication List      Dilt drip    Heparin drip     Lisinopril 10mg/day    Digoxin 125mcg/day    Metoprolol 25mg bid                                                                         Consultations:   Consultation during this admission received from cardiology            Discharge Exam:   Blood pressure 109/70, pulse 72, temperature 98  F (36.7  C), temperature source Oral, resp. rate 18, weight 79.3 kg (174 lb 14.4 oz), SpO2 92%.  GENERAL APPEARANCE: healthy, alert and no distress  EYES: conjunctiva clear, eyes grossly normal  HENT:  external ears and nose normal   NECK: supple, no masses or adenopathy  RESP: lungs clear to auscultation - no rales, rhonchi or wheezes  CV: regular rate and rhythm, normal S1 S2, no S3 or S4 and no murmur, click or rub   ABDOMEN: soft, nontender, no HSM or masses and bowel sounds normal  MS: no clubbing, cyanosis; no edema  SKIN: clear without significant rashes or lesions  NEURO: Normal strength and tone, sensory exam grossly normal, mentation intact and speech normal    Unresulted Labs Ordered in the Past 30 Days of this Admission       No orders found for last 31 day(s).            No results found for this or any previous visit (from the past 24 hour(s)).         Pending Tests at Discharge:   None              Discharge Disposition:     Transferred to [facility]       Attestation:  I have reviewed today's vital signs, notes, medications, labs and imaging.    Time Spent on this Encounter   IKitty MD, personally saw the patient today and spent greater than 30 minutes discharging this patient.    Kitty Dunbar MD

## 2023-10-14 VITALS
HEART RATE: 73 BPM | DIASTOLIC BLOOD PRESSURE: 79 MMHG | TEMPERATURE: 98 F | WEIGHT: 173.1 LBS | RESPIRATION RATE: 18 BRPM | BODY MASS INDEX: 26.32 KG/M2 | SYSTOLIC BLOOD PRESSURE: 122 MMHG | OXYGEN SATURATION: 95 %

## 2023-10-14 LAB
ANION GAP SERPL CALCULATED.3IONS-SCNC: 10 MMOL/L (ref 7–15)
BUN SERPL-MCNC: 11.3 MG/DL (ref 8–23)
CALCIUM SERPL-MCNC: 9.2 MG/DL (ref 8.8–10.2)
CHLORIDE SERPL-SCNC: 104 MMOL/L (ref 98–107)
CREAT SERPL-MCNC: 0.83 MG/DL (ref 0.67–1.17)
DEPRECATED HCO3 PLAS-SCNC: 24 MMOL/L (ref 22–29)
EGFRCR SERPLBLD CKD-EPI 2021: >90 ML/MIN/1.73M2
ERYTHROCYTE [DISTWIDTH] IN BLOOD BY AUTOMATED COUNT: 12.7 % (ref 10–15)
GLUCOSE SERPL-MCNC: 101 MG/DL (ref 70–99)
HCT VFR BLD AUTO: 46.7 % (ref 40–53)
HGB BLD-MCNC: 15.6 G/DL (ref 13.3–17.7)
MCH RBC QN AUTO: 33.5 PG (ref 26.5–33)
MCHC RBC AUTO-ENTMCNC: 33.4 G/DL (ref 31.5–36.5)
MCV RBC AUTO: 100 FL (ref 78–100)
PLATELET # BLD AUTO: 155 10E3/UL (ref 150–450)
POTASSIUM SERPL-SCNC: 4.1 MMOL/L (ref 3.4–5.3)
RBC # BLD AUTO: 4.66 10E6/UL (ref 4.4–5.9)
SODIUM SERPL-SCNC: 138 MMOL/L (ref 135–145)
WBC # BLD AUTO: 8.8 10E3/UL (ref 4–11)

## 2023-10-14 PROCEDURE — 85027 COMPLETE CBC AUTOMATED: CPT | Performed by: STUDENT IN AN ORGANIZED HEALTH CARE EDUCATION/TRAINING PROGRAM

## 2023-10-14 PROCEDURE — 99232 SBSQ HOSP IP/OBS MODERATE 35: CPT | Performed by: INTERNAL MEDICINE

## 2023-10-14 PROCEDURE — 80048 BASIC METABOLIC PNL TOTAL CA: CPT | Performed by: STUDENT IN AN ORGANIZED HEALTH CARE EDUCATION/TRAINING PROGRAM

## 2023-10-14 PROCEDURE — 99239 HOSP IP/OBS DSCHRG MGMT >30: CPT | Performed by: INTERNAL MEDICINE

## 2023-10-14 PROCEDURE — 250N000013 HC RX MED GY IP 250 OP 250 PS 637: Performed by: STUDENT IN AN ORGANIZED HEALTH CARE EDUCATION/TRAINING PROGRAM

## 2023-10-14 PROCEDURE — 250N000013 HC RX MED GY IP 250 OP 250 PS 637: Performed by: INTERNAL MEDICINE

## 2023-10-14 PROCEDURE — 36415 COLL VENOUS BLD VENIPUNCTURE: CPT | Performed by: STUDENT IN AN ORGANIZED HEALTH CARE EDUCATION/TRAINING PROGRAM

## 2023-10-14 RX ORDER — METOPROLOL TARTRATE 25 MG/1
25 TABLET, FILM COATED ORAL 2 TIMES DAILY
Qty: 60 TABLET | Refills: 3 | Status: SHIPPED | OUTPATIENT
Start: 2023-10-14 | End: 2023-12-21

## 2023-10-14 RX ORDER — AMIODARONE HYDROCHLORIDE 200 MG/1
200 TABLET ORAL 2 TIMES DAILY
Qty: 60 TABLET | Refills: 3 | Status: ON HOLD | OUTPATIENT
Start: 2023-10-14 | End: 2023-11-08

## 2023-10-14 RX ORDER — AMIODARONE HYDROCHLORIDE 200 MG/1
200 TABLET ORAL 2 TIMES DAILY
Status: DISCONTINUED | OUTPATIENT
Start: 2023-10-14 | End: 2023-10-14 | Stop reason: HOSPADM

## 2023-10-14 RX ORDER — ATORVASTATIN CALCIUM 40 MG/1
40 TABLET, FILM COATED ORAL EVERY EVENING
Qty: 30 TABLET | Refills: 3 | Status: SHIPPED | OUTPATIENT
Start: 2023-10-14 | End: 2024-02-08

## 2023-10-14 RX ADMIN — METOPROLOL TARTRATE 25 MG: 25 TABLET, FILM COATED ORAL at 08:48

## 2023-10-14 RX ADMIN — LISINOPRIL 10 MG: 5 TABLET ORAL at 08:49

## 2023-10-14 RX ADMIN — AMIODARONE HYDROCHLORIDE 200 MG: 200 TABLET ORAL at 12:35

## 2023-10-14 RX ADMIN — APIXABAN 5 MG: 5 TABLET, FILM COATED ORAL at 08:49

## 2023-10-14 ASSESSMENT — ACTIVITIES OF DAILY LIVING (ADL)
ADLS_ACUITY_SCORE: 18
ADLS_ACUITY_SCORE: 18
ADLS_ACUITY_SCORE: 35
ADLS_ACUITY_SCORE: 18
ADLS_ACUITY_SCORE: 18
ADLS_ACUITY_SCORE: 35
ADLS_ACUITY_SCORE: 18

## 2023-10-14 NOTE — PLAN OF CARE
Problem: Adult Inpatient Plan of Care  Goal: Optimal Comfort and Wellbeing  Intervention: Provide Person-Centered Care  Recent Flowsheet Documentation  Taken 10/14/2023 0800 by Bhargav Pablo RN  Trust Relationship/Rapport:   choices provided   care explained   questions answered   questions encouraged   reassurance provided   Goal Outcome Evaluation:    He continues to be in NSR. Cardiologist added amiodarone as a medication for him today. Discharge orders placed. All discharge paperwork discussed with patient and all questions answered. IVs removed.   
  Problem: Dysrhythmia  Goal: Normalized Cardiac Rhythm  Outcome: Progressing   Goal Outcome Evaluation:       Pt transferred to the unit around 2200 this evening. Pt is AOX4 and denies pain. Pt is independent in the room and is able to make his needs known. Right IV infiltrated, elevation and ice applied. 2 left IV's placed. Hep gtt running at 1000 unit/hr and dilt gtt running at 15mg/hr, -150's while resting.                 
  Problem: Dysrhythmia  Goal: Normalized Cardiac Rhythm  Outcome: Progressing   Goal Outcome Evaluation:    Had DANA and cardioversion today. Has remained in NSR since cardioversion. Calm and pleasant. Eating and drinking well. Family at the bedside. He is eager to discharge tomorrow.     
  Problem: Heart Failure  Goal: Optimal Coping  Outcome: Progressing     Problem: Dysrhythmia  Goal: Normalized Cardiac Rhythm  Outcome: Progressing     Problem: Risk for Delirium  Goal: Improved Attention and Thought Clarity  Outcome: Progressing   Goal Outcome Evaluation:       Pt had DANA completed this AM shift. Results were as follows:  The left ventricle is normal in size.  The visual ejection fraction is 30-35%.  There is moderate global hypokinesia of the left ventricle.  Mildly decreased right ventricular systolic function  Trabeculations in MINDI but no clear thrombus.    Pt remained NPO and had scheduled cardioversion in the ICU. Heart rhythm converted from atrial flutter to Sinus rhythm with inverted T wave. Pt denies pain. Pt is alert and oriented x4. Pt to stay for further monitoring overnight. Diltiazem and Heparin infusion discontinued.   Zeb Montanez RN  10/13/2023  3:04 PM                   
"Goal Outcome Evaluation:    Problem: Dysrhythmia  Goal: Normalized Cardiac Rhythm  Outcome: Progressing     Problem: Pain Acute  Goal: Optimal Pain Control and Function  Outcome: Met  Intervention: Prevent or Manage Pain            A&Ox4. Heavy sleeper between cares. Requested minimal interruptions this shift \"I only slept about two hours yesterday.\"    VSS on RA. Denies pain.   SR with T wave inversion this shift.   Eager to discharge, \"I am hoping to go home in the morning.\"    /78 (BP Location: Left arm)   Pulse 79   Temp 98  F (36.7  C) (Oral)   Resp 16   Wt 79.3 kg (174 lb 14.4 oz)   SpO2 96%   BMI 26.59 kg/m               "
Goal Outcome Evaluation:      Plan of Care Reviewed With: patient    Overall Patient Progress: improvingOverall Patient Progress: improving     Has been on diltiazem drip , heparin drip, nd PO digoxin. Heart rate finally coming down and has been  range. Pt. Denied pain, nausea, dyspnea.B/p WDL. Cntinue to monitor. Possible angio planned.      
144

## 2023-10-14 NOTE — PROGRESS NOTES
Cardiology Progress Note    Assessment:  Atrial fibrillation status post external cardioversion with maintenance of normal sinus rhythm  Dilated cardiomyopathy presumed tachycardia induced, severely decreased LV systolic function, no overt fluid overload but elevated BNP and CVP  History of heavy alcohol use      Plan:  Discussed with EP physician Dr. Richard.  We will start him on amiodarone and consider for early PVI.  He will follow-up with EP service    I discussed with him importance of moderation of alcohol use    Outpatient reevaluation of LV systolic function with echo.  Further evaluation may be needed if LV systolic function remains depressed    Subjective:   Underwent DANA guided cardioversion yesterday.  Converted to sinus rhythm with pauses and junctional rhythm.  He has not had any recurrence of atrial fibrillation so bradycardia.  He feels well and he wants to go home this morning    Objective:   /79 (BP Location: Right arm, Cuff Size: Adult Regular)   Pulse 73   Temp 98  F (36.7  C) (Oral)   Resp 18   Wt 78.5 kg (173 lb 1.6 oz)   SpO2 95%   BMI 26.32 kg/m      Intake/Output Summary (Last 24 hours) at 10/13/2023 0830  Last data filed at 10/13/2023 0115  Gross per 24 hour   Intake --   Output 850 ml   Net -850 ml         Physical Exam:  GENERAL: no distress  NECK: No JVD  LUNGS: Clear to auscultation.  CARDIAC: irregular  rhythm, S1 & S2 normal.  No heaves, thrills, gallops or murmurs.  ABDOMEN: flat, negative hepatosplenomegaly, soft and non-tender.  EXTREMITIES: No evidence of cyanosis, clubbing or edema.    Current Facility-Administered Medications Ordered in Epic   Medication Dose Route Frequency Provider Last Rate Last Admin    acetaminophen (TYLENOL) tablet 650 mg  650 mg Oral Q6H PRN Andre Webb MD        Or    acetaminophen (TYLENOL) Suppository 650 mg  650 mg Rectal Q6H PRN Andre Webb MD        amiodarone (PACERONE) tablet 200 mg  200 mg Oral BID Eusebio Carney,  MD        apixaban ANTICOAGULANT (ELIQUIS) tablet 5 mg  5 mg Oral BID Eusebio Carney MD   5 mg at 10/14/23 0849    atorvastatin (LIPITOR) tablet 40 mg  40 mg Oral QPM Andre Webb MD   40 mg at 10/13/23 2010    hydrALAZINE (APRESOLINE) injection 10 mg  10 mg Intravenous Q6H PRN Andre Webb MD        lisinopril (ZESTRIL) tablet 10 mg  10 mg Oral Daily Andre Webb MD   10 mg at 10/14/23 0849    melatonin tablet 1 mg  1 mg Oral At Bedtime PRN Andre Webb MD        metoprolol (LOPRESSOR) injection 5 mg  5 mg Intravenous Q5 Min PRN Andre Webb MD   5 mg at 10/13/23 0028    metoprolol tartrate (LOPRESSOR) tablet 25 mg  25 mg Oral BID Andre Webb MD   25 mg at 10/14/23 0848    naloxone (NARCAN) injection 0.2 mg  0.2 mg Intravenous Q2 Min PRN Evert Allan MD        Or    naloxone (NARCAN) injection 0.4 mg  0.4 mg Intravenous Q2 Min PRN Evert Allan MD        Or    naloxone (NARCAN) injection 0.2 mg  0.2 mg Intramuscular Q2 Min PRN Evert Allan MD        Or    naloxone (NARCAN) injection 0.4 mg  0.4 mg Intramuscular Q2 Min PRN Evert Allan MD        ondansetron (ZOFRAN ODT) ODT tab 4 mg  4 mg Oral Q6H PRN Andre Webb MD        Or    ondansetron (ZOFRAN) injection 4 mg  4 mg Intravenous Q6H PRN Andre Webb MD        prochlorperazine (COMPAZINE) injection 10 mg  10 mg Intravenous Q6H PRN Andre Webb MD        Or    prochlorperazine (COMPAZINE) tablet 10 mg  10 mg Oral Q6H PRN Andre Webb MD        Or    prochlorperazine (COMPAZINE) suppository 25 mg  25 mg Rectal Q12H PRN Andre Webb MD         No current Epic-ordered outpatient medications on file.       Cardiographics:    Telemetry: Normal sinus rhythm    ECG: A-fib no Q waves no ischemic changes    Echocardiogram:   Left ventricular systolic function is severely reduced.The visual ejection  fraction is 20-25%.There is severe global hypokinesia of the  "left ventricle  with some regionality with basal inferolatreal wall mars the best.  Moderately decreased right ventricular systolic function  Moderate bi-atrial enlargement.  There is mild (1+) tricuspid regurgitation.  IVC diameter >2.1 cm collapsing <50% with sniff suggests a high RA pressure  estimated at 15 mmHg or greater.  The rhythm was atrial fibrillation.     Lab Results    Chemistry/lipid CBC Cardiac Enzymes/BNP/TSH/INR   Recent Labs   Lab Test 10/13/23  0000   CHOL 147   HDL 60   LDL 65   TRIG 108     Recent Labs   Lab Test 10/13/23  0000 02/06/19  1518 05/22/17  1550   LDL 65 98 93     Recent Labs   Lab Test 10/13/23  0622      POTASSIUM 3.9   CHLORIDE 104   CO2 23   *   BUN 9.6   CR 0.87   GFRESTIMATED >90   EROS 9.0     Recent Labs   Lab Test 10/13/23  0622 10/11/23  0543 10/10/23  1620   CR 0.87 0.90 0.97     Recent Labs   Lab Test 10/13/23  0000 02/06/19  1518 05/22/17  1550   A1C 6.2* 5.5 5.7          Recent Labs   Lab Test 10/13/23  0622   WBC 12.5*   HGB 15.1   HCT 45.0           Recent Labs   Lab Test 10/13/23  0622 10/13/23  0000 10/12/23  1652   HGB 15.1 16.0 15.9    No results for input(s): \"TROPONINI\" in the last 50564 hours.  Recent Labs   Lab Test 10/11/23  0543   NTBNPI 1,449*     Recent Labs   Lab Test 10/10/23  1620   TSH 2.49     Recent Labs   Lab Test 10/10/23  1620   INR 1.02                   "

## 2023-10-14 NOTE — DISCHARGE SUMMARY
[facility]  Hospitalist Discharge Summary      Date of Admission:  10/12/2023  Date of Discharge:  10/14/2023  Discharging Provider: Shashi Galvez,   Discharge Service: Hospitalist Service        Follow-ups Needed After Discharge   Follow-up Appointments     Follow-up and recommended labs and tests       Follow up with primary care provider, Jenniffer Matt, within 7   days for hospital follow- up.    Follow up with cardiology as directed            Unresulted Labs Ordered in the Past 30 Days of this Admission       No orders found from 9/12/2023 to 10/13/2023.        These results will be followed up by Hospitalist results pool    Discharge Disposition   Discharged to home  Condition at discharge: Stable      Hospital Course   62M transferred from [facility] for atrial fibrillation with RVR, heart failure; pmhx includes HTN, alcohol/tobacco dependence; admitted from [place] for atrial fibrillation with rapid response, compensated heart failure.     Atrial fibrillation with rapid ventricular response  HFrEF, severe with EF 30%  Compensated heart failure on exam. HFrEF a new diagnosis. Suspect most likely a tachyarrhythmia-mediated cardiomyopathy, but could be contributions of alcohol or ischemia as well. Relatively asymptomatic.  -- Underwent DANA without evidence of MINDI thrombus followed by synchronized cardioversion with successful return to NSR  -- plan outpatient EP follow up for re-assess LV function   -- continue amiodarone and metoprolol and eliquis after discharge  -- continue statin after discharge, LDL 65  -- continue lisinopril  --Will need follow up TTE to ensue resolution of systolic dysfunction with return to NSR, timing per cardiology. Ischemic eval consideration pending results.       Tobacco dependence  Alcohol dependence  1ppd cigarettes, declines NRT or chantix. 12 beers/day usual ingestions, no previous withdrawal, last ingestion was 3 days prior to transfer. He  understands habits could worsen cardiac function. Counseled on cessation       HTN  -- Continue lisinopril  -- start metoprolol      Superficial thrombophlebitis: provoked from site of previous peripheral IV site RUE.   Patient feels this is already improving   No asymmetric arm swelling  -- already going home on eliquis  -- recommend cold compresses after discharge  -- outpatient follow up      Consultations This Hospital Stay   CARDIOLOGY IP CONSULT  PHARMACY IP CONSULT  CARDIOLOGY IP CONSULT  ELECTROPHYSIOLOGY IP CONSULT    Code Status   Full Code    Time Spent on this Encounter   I, Shashi Galvez DO, personally saw the patient today and spent greater than 30 minutes discharging this patient.       Shashi Galvez DO  [facility]  [address] [place] [id number]  Phone: [phone]  Fax: [phone]  ______________________________________________________________________    Physical Exam   Vital Signs: Temp: 98  F (36.7  C) Temp src: Oral BP: 122/79 Pulse: 73   Resp: 18 SpO2: 95 % O2 Device: None (Room air) Oxygen Delivery: 6 LPM  Weight: 173 lbs 1.6 oz    General: NAD  HEENT: Without congestion or inflammation  RESPIRATORY: Respirations nonlabored  CARDIOVASCULAR: No le edema bilat.  MUSCULOSKELETAL: small area of erythema and mild swelling localized to site of previous peripheral IV site RUE antecubital area  NEUROLOGIC: No focal arm or leg weakness, speech is clear    Primary Care Physician   Jenniffer Matt    Discharge Orders      Reason for your hospital stay    A. fib     Follow-up and recommended labs and tests     Follow up with primary care provider, Jenniffer Matt, within 7 days for hospital follow- up.    Follow up with cardiology as directed     Activity    Your activity upon discharge: activity as tolerated     Diet    Follow this diet upon discharge: Orders Placed This Encounter      Low Saturated Fat Na <2400 mg      \    Discharge Medications   Current Discharge Medication List        START taking these medications    Details   amiodarone (PACERONE) 200 MG tablet Take 1 tablet (200 mg) by mouth 2 times daily  Qty: 60 tablet, Refills: 3    Associated Diagnoses: Atrial fibrillation with rapid ventricular response (H)      apixaban ANTICOAGULANT (ELIQUIS) 5 MG tablet Take 1 tablet (5 mg) by mouth 2 times daily  Qty: 60 tablet, Refills: 3    Associated Diagnoses: Atrial fibrillation with rapid ventricular response (H)      atorvastatin (LIPITOR) 40 MG tablet Take 1 tablet (40 mg) by mouth every evening  Qty: 30 tablet, Refills: 3    Associated Diagnoses: Mixed hyperlipidemia      metoprolol tartrate (LOPRESSOR) 25 MG tablet Take 1 tablet (25 mg) by mouth 2 times daily  Qty: 60 tablet, Refills: 3    Associated Diagnoses: Atrial fibrillation with rapid ventricular response (H)           CONTINUE these medications which have NOT CHANGED    Details   calcium carbonate (OS-EROS) 600 MG tablet Take 600 mg by mouth daily      cephALEXin (KEFLEX) 500 MG capsule Take 500 mg by mouth 4 times daily      cholecalciferol (VITAMIN D3) 10 mcg (400 units) TABS tablet Take 1,000 Units by mouth daily      lisinopril (ZESTRIL) 10 MG tablet TAKE 1 TABLET (10 MG) BY MOUTH DAILY  Qty: 90 tablet, Refills: 3    Associated Diagnoses: Primary hypertension      Multiple Vitamins-Minerals (MULTIVITAMIN ADULTS 50+ PO) Take 1 tablet by mouth daily      Omega-3 Fatty Acids (FISH OIL) 1200 MG capsule Take 1,200 mg by mouth daily           Allergies   No Known Allergies

## 2023-10-15 LAB
ATRIAL RATE - MUSE: 66 BPM
DIASTOLIC BLOOD PRESSURE - MUSE: NORMAL MMHG
INTERPRETATION ECG - MUSE: NORMAL
P AXIS - MUSE: 46 DEGREES
PR INTERVAL - MUSE: 178 MS
QRS DURATION - MUSE: 90 MS
QT - MUSE: 444 MS
QTC - MUSE: 465 MS
R AXIS - MUSE: 3 DEGREES
SYSTOLIC BLOOD PRESSURE - MUSE: NORMAL MMHG
T AXIS - MUSE: 150 DEGREES
VENTRICULAR RATE- MUSE: 66 BPM

## 2023-10-15 NOTE — ANESTHESIA POSTPROCEDURE EVALUATION
Patient: Lan Manuel    Procedure: * No procedures listed *  Cardioversion External    Anesthesia Type:  General    Note:  Disposition: ICU            ICU Sign Out: Anesthesiologist/ICU physician sign out WAS performed   Postop Pain Control: Uneventful            Sign Out: Well controlled pain   PONV: No   Neuro/Psych: Uneventful            Sign Out: Acceptable/Baseline neuro status   Airway/Respiratory: Uneventful            Sign Out: Acceptable/Baseline resp. status   CV/Hemodynamics: Uneventful            Sign Out: Acceptable CV status; No obvious hypovolemia; No obvious fluid overload   Other NRE: NONE   DID A NON-ROUTINE EVENT OCCUR? No           Last vitals:  There were no vitals filed for this visit.    Electronically Signed By: Gustavo Keita MD  October 15, 2023  2:16 PM

## 2023-10-16 ENCOUNTER — TELEPHONE (OUTPATIENT)
Dept: CARDIOLOGY | Facility: CLINIC | Age: 63
End: 2023-10-16
Payer: COMMERCIAL

## 2023-10-16 ENCOUNTER — PATIENT OUTREACH (OUTPATIENT)
Dept: CARE COORDINATION | Facility: CLINIC | Age: 63
End: 2023-10-16
Payer: COMMERCIAL

## 2023-10-16 NOTE — PROGRESS NOTES
"Clinic Care Coordination Contact  [facility]: Post-Discharge Note  SITUATION                                                      Admission:    Admission Date: 10/12/23   Reason for Admission: Atrial fibrillation with rapid ventricular response  HFrEF, severe with EF 30%  Discharge:   Discharge Date: 10/14/23  Discharge Diagnosis: Atrial fibrillation with rapid ventricular response  HFrEF, severe with EF 30%    BACKGROUND                                                      Per hospital discharge summary and inpatient provider notes:62M transferred from [facility] for atrial fibrillation with RVR, heart failure; pmhx includes HTN, alcohol/tobacco dependence; admitted from [place] for atrial fibrillation with rapid response, compensated heart failure.     Atrial fibrillation with rapid ventricular response  HFrEF, severe with EF 30%  Compensated heart failure on exam. HFrEF a new diagnosis. Suspect most likely a tachyarrhythmia-mediated cardiomyopathy, but could be contributions of alcohol or ischemia as well. Relatively asymptomatic.  -- Underwent DANA without evidence of MINDI thrombus followed by synchronized cardioversion with successful return to NSR  -- plan outpatient EP follow up for re-assess LV function   -- continue amiodarone and metoprolol and eliquis after discharge  -- continue statin after discharge, LDL 65  -- continue lisinopril  --Will need follow up TTE to ensue resolution of systolic dysfunction with return to NSR, timing per cardiology. Ischemic eval consideration pending results.      ASSESSMENT           Discharge Assessment  How are you doing now that you are home?: \" I am doing pretty good\"  How are your symptoms? (Red Flag symptoms escalate to triage hotline per guidelines): Improved  Do you feel your condition is stable enough to be safe at home until your provider visit?: Yes  Does the patient have their discharge instructions? : Yes  Does the patient have questions regarding their " discharge instructions? : No  Were you started on any new medications or were there changes to any of your previous medications? : Yes  Does the patient have all of their medications?: Yes  Do you have questions regarding any of your medications? : No  Do you have all of your needed medical supplies or equipment (DME)?  (i.e. oxygen tank, CPAP, cane, etc.): Yes  Discharge follow-up appointment scheduled within 14 calendar days? : Yes  Discharge Follow Up Appointment Date: 10/19/23  Discharge Follow Up Appointment Scheduled with?: Specialty Care Provider    Post-op ([facility] Only)  If the patient had a surgery or procedure, do they have any questions for a nurse?: No             PLAN                                                      Outpatient Plan: Follow-up Appointments     Follow-up and recommended labs and tests       Follow up with primary care provider, Jenniffer Matt, within 7   days for hospital follow- up.    Follow up with cardiology as directed     Future Appointments   Date Time Provider Department Center   10/19/2023  2:50 PM Lilo Wills, NP [facility]         For any urgent concerns, please contact our 24 hour nurse triage line: [phone] ([phone])         Corina Fernández

## 2023-10-16 NOTE — TELEPHONE ENCOUNTER
Dr. Richard,     PVI Referral Request    Dr Richard-    Pt has PAF    If agreeable will schedule as follows:  Consult: Pt prefers to see you AM of procedure  Imaging Prior:  DANA d one 10/13, appears pt is new to [facility] on 10/13/23  Anticoagulation Status: Continue Eliquis    AAD: Amiodarone-Please verify if you would like this held prior to PVI or continued  PPI: start Protonix 40mg Daily 3 days prior, and continue s/p for 6 wks  Scheduling Info: Dr Richard preferance-2:1 day  Mapping: Dr Richard preference- [facility], unless indicated other wise      Ok to to order/schedule with you?  Please advise    Lilo ZULUAGA for you as he has a consult with you this week  Thank you  Eugenie Nicole RN  10/16/2023 1:19 PM

## 2023-10-16 NOTE — TELEPHONE ENCOUNTER
----- Message from Eusebio Carney MD sent at 10/14/2023 11:48 AM CDT -----  Discussed with dr Richard, he should be consider for PVI in next 2weeks

## 2023-10-18 PROBLEM — I48.92 ATRIAL FLUTTER, UNSPECIFIED TYPE (H): Status: ACTIVE | Noted: 2023-10-18

## 2023-10-18 NOTE — H&P (VIEW-ONLY)
Thank you, Dr. Hastings, for asking the [facility] Heart Care team to see Mr. Lan Manuel to evaluate new  onset AF/AFL.    Assessment/Recommendations     Assessment/Plan:    Diagnoses and all orders for this visit:  New onset atrial fibrillation (H)  Atrial flutter, unspecified type (H)    -Amiodarone 200 mg BID  -metoprolol tartrate 25 mg BID  -He remains in normal sinus rhythm today with controlled heart rates in the 60s.  Continues to report persistent fatigue with some mild shortness of breath on exertion.  He denies any vision changes, tremors or unsteady balance since initiation of amiodarone.  He will continue to monitor heart rates at home using his Fitbit and or oximeter  -Patient was advised to continue working on reducing his ETOH intake, previously 15 beers daily, down to 6 beers daily currently. Continues to smoke marijuana daily. Prone to dehydration. Advised him to increase water intake to at least 40 to 60 ounces daily, currently drinks no water per day.   -We reviewed the pathophysiology of atrial fibrillation and management considerations including stroke risk and anticoagulation, rate control, cardioversion, antiarrhythmic drug therapy, and catheter ablation.  Antiarrhythmic drug options discussed. We discussed atrial fibrillation ablation procedures, anticipated success rates, the potential need for re-do ablation vs addition of anti-arrhythmic drugs, procedural risks (including groin bleeding, tamponade, phrenic or esophageal injury, stroke, pulmonary vein stenosis) and recovery expectations.  -patient agreeable to PVI ablation with Dr Richard.  EP team to arrange scheduling ablation and will reach out to patient to discuss surgery dates  -He was provides with educational handouts  re: ablation and Afib today       Essential hypertension    -Blood pressure 92/60, continue with current medication regimen  -Lisinopril 10 mg daily        [id number] score of 1 for HTN and on  Eliquis BID.    Follow up per EP team for PVI ablation with Dr Richard.     History of Present Illness/Subjective     Lan Manuel is a very pleasant 62 year old male who comes in today for EP consult AF, AFL    Lan Manuel has a known history of newly diagnosed atrial fibs/atrial flutter, essential hypertension, alcohol use, nicotine dependence.    Arrhythmia hx  Dx/date: 10/10/23, patient presented to his primary care provider for his wellness exam and reported not feeling well. Heart rate noted to be fast in clinic.  Patient denies any chest pain or shortness of breath.  Denies any lightheadedness.  Does not feel as though his heart is beating quickly necessarily. EKG confirmed Afib with Atrial flutter, rates 's  Sx: fatigue  Prior AAD, AV nadia blocking agents: amiodarone, metoprolol tartrate BID  Procedures  DCCV: no  Ablation: no    Patient presents to the [facility] today for PVI ablation consult.  His wife is present today.  Newly diagnosed A-fib and atrial flutter on October 10, he was hospitalized with rates between 80 and 150s.  He remains in normal sinus rhythm today with rates in the 60s, he remains on a rhythm control strategy consisting of amiodarone 200 mg twice daily + metoprolol tartrate twice daily.  He denies any recent chest pain or palpitations, lightheadedness or dizziness, presyncope or syncopal episodes, orthopnea or PND.  He does report mild shortness of breath on exertion pacifically when he is outside doing lawn work, he reports moderate fatigue that is persistent.  He denies any tremors, unsteady balance or vision changes since initiation of amiodarone.  He denies any bleeding issues since initiation of Eliquis which he remains on twice daily for stroke prevention.  He has been monitoring heart rates at home using oximeter and his Fitbit which his wife recently purchased for him.  He is prone to dehydration, he does not drink water at all throughout the day, only  chocolate milk.  Caffeine intake is moderate, typically drinks 3 cans of soda daily.  He has cut back on his alcohol intake, previously was drinking 15 cans of beer a day, is now down to 6 cans of beer daily.  He is smoking pack and a half of cigarettes daily and he does smoke marijuana daily.  DANA during his hospital stay did reveal a reduced LVEF between 30 and 35% likely tachycardia mediated from his new onset atrial fibrillation and flutter.     Cardiographics (reviewed):    10/13/23 normal sinus rhythm left atrial enlargement rate 66 bpm QRS duration 90 ms QT/QTc 444/465 ms      10/10/23 Atrial fibrillation rate 83 bpm         10/10/23      10/12/23 DANA  The left ventricle is normal in size.  The visual ejection fraction is 30-35%.  There is moderate global hypokinesia of the left ventricle.  Mildly decreased right ventricular systolic function  Trabeculations in MINDI but no clear thrombus.  ______________________________________     Problem List:  Patient Active Problem List   Diagnosis    Essential hypertension    Multiple rib fractures    Nicotine dependence    Left scapula fracture    Diverticulitis    Diverticulitis of colon    Appendicular diverticulum    Alcohol use    Adenomatous colon polyp    Ganglion    Atrial fibrillation with RVR (H)    New onset atrial fibrillation (H)    Atrial flutter, unspecified type (H)     Revi  e  Physical Examination Review of Systems   w fystems  There were no vitals taken for this visit.  There is no height or weight on file to calculate BMI.  Wt Readings from Last 3 Encounters:   10/14/23 78.5 kg (173 lb 1.6 oz)   10/12/23 80.4 kg (177 lb 4 oz)   10/10/23 82.7 kg (182 lb 4 oz)     General Appearance:   Alert, well-appearing and in no acute distress. Moderate fatigue noted.    HEENT: Atraumatic, normocephalic.  No scleral icterus, normal conjunctivae; mucous membranes pink and moist.     Chest: Chest symmetric, spine straight.   Lungs:   Respirations unlabored: Lungs are  clear to auscultation.   Cardiovascular:   Normal first and second heart sounds with no murmurs, rubs, or gallops.  Regular, regular.   Normal JVD, no edema.       Extremities: No cyanosis or clubbing   Musculoskeletal: Moves all extremities   Skin: Warm, dry, intact.    Neurologic: Mood and affect are appropriate, alert and oriented to person, place, time, and situation     ROS: 10 point ROS neg other than the symptoms noted above in the HPI.     Medical History  Surgical History Family History Social History     Past Medical History:   Diagnosis Date    Atrial fibrillation with RVR (H) 10/10/2023    Hypertension 2015    Past Surgical History:   Procedure Laterality Date    COLONOSCOPY N/A 3/2/2021    Procedure: COLONOSCOPY, WITH POLYPECTOMY AND BIOPSY;  Surgeon: Francis Chow MD;  Location: [facility]    IR LUMBAR EPIDURAL STEROID INJECTION  2007    Family History   Problem Relation Age of Onset    Myocardial Infarction Mother 37         of MI at 37    No Known Problems Father     Heart Disease Mother 37.00    Allergies Father     History   Smoking Status    Every Day    Packs/day: 1.00    Types: Cigarettes   Smokeless Tobacco    Never     Social History    Substance and Sexual Activity      Alcohol use: Yes        Comment: 8-12       Medications  Allergies     Current Outpatient Medications   Medication Sig Dispense Refill    amiodarone (PACERONE) 200 MG tablet Take 1 tablet (200 mg) by mouth 2 times daily 60 tablet 3    apixaban ANTICOAGULANT (ELIQUIS) 5 MG tablet Take 1 tablet (5 mg) by mouth 2 times daily 60 tablet 3    atorvastatin (LIPITOR) 40 MG tablet Take 1 tablet (40 mg) by mouth every evening 30 tablet 3    calcium carbonate (OS-EROS) 600 MG tablet Take 600 mg by mouth daily      cholecalciferol (VITAMIN D3) 10 mcg (400 units) TABS tablet Take 1,000 Units by mouth daily      lisinopril (ZESTRIL) 10 MG tablet TAKE 1 TABLET (10 MG) BY MOUTH DAILY (Patient taking differently: Take 10 mg by mouth  "daily) 90 tablet 3    metoprolol tartrate (LOPRESSOR) 25 MG tablet Take 1 tablet (25 mg) by mouth 2 times daily 60 tablet 3    Multiple Vitamins-Minerals (MULTIVITAMIN ADULTS 50+ PO) Take 1 tablet by mouth daily      Omega-3 Fatty Acids (FISH OIL) 1200 MG capsule Take 1,200 mg by mouth daily        No Known Allergies   Medical, surgical, family, social history, and medications were all reviewed and updated as necessary.   Lab Results    Chemistry/lipid CBC Cardiac Enzymes/BNP/TSH/INR   Recent Labs   Lab Test 10/13/23  0000   CHOL 147   HDL 60   LDL 65   TRIG 108     Recent Labs   Lab Test 10/13/23  0000 02/06/19  1518 05/22/17  1550   LDL 65 98 93     Recent Labs   Lab Test 10/14/23  0422      POTASSIUM 4.1   CHLORIDE 104   CO2 24   *   BUN 11.3   CR 0.83   GFRESTIMATED >90   EROS 9.2     Recent Labs   Lab Test 10/14/23  0422 10/13/23  0622 10/11/23  0543   CR 0.83 0.87 0.90     Recent Labs   Lab Test 10/13/23  0000 02/06/19  1518 05/22/17  1550   A1C 6.2* 5.5 5.7          Recent Labs   Lab Test 10/14/23  0422   WBC 8.8   HGB 15.6   HCT 46.7           Recent Labs   Lab Test 10/14/23  0422 10/13/23  0622 10/13/23  0000   HGB 15.6 15.1 16.0    No results for input(s): \"TROPONINI\" in the last 64610 hours.  Recent Labs   Lab Test 10/11/23  0543   NTBNPI 1,449*     Recent Labs   Lab Test 10/10/23  1620   TSH 2.49     Recent Labs   Lab Test 10/10/23  1620   INR 1.02          Total Time- 49 minutes spent on date of encounter doing chart review, history and exam, documentation and further activities as noted above.  This note has been dictated using voice recognition software. Any grammatical, typographical, or context distortions are unintentional and inherent to the software.    Lilo Wills [facility]  [phone]                       "

## 2023-10-18 NOTE — PROGRESS NOTES
Thank you, Dr. Hastings, for asking the [facility] Heart Care team to see Mr. Lan Manuel to evaluate new  onset AF/AFL.    Assessment/Recommendations     Assessment/Plan:    Diagnoses and all orders for this visit:  New onset atrial fibrillation (H)  Atrial flutter, unspecified type (H)    -Amiodarone 200 mg BID  -metoprolol tartrate 25 mg BID  -He remains in normal sinus rhythm today with controlled heart rates in the 60s.  Continues to report persistent fatigue with some mild shortness of breath on exertion.  He denies any vision changes, tremors or unsteady balance since initiation of amiodarone.  He will continue to monitor heart rates at home using his Fitbit and or oximeter  -Patient was advised to continue working on reducing his ETOH intake, previously 15 beers daily, down to 6 beers daily currently. Continues to smoke marijuana daily. Prone to dehydration. Advised him to increase water intake to at least 40 to 60 ounces daily, currently drinks no water per day.   -We reviewed the pathophysiology of atrial fibrillation and management considerations including stroke risk and anticoagulation, rate control, cardioversion, antiarrhythmic drug therapy, and catheter ablation.  Antiarrhythmic drug options discussed. We discussed atrial fibrillation ablation procedures, anticipated success rates, the potential need for re-do ablation vs addition of anti-arrhythmic drugs, procedural risks (including groin bleeding, tamponade, phrenic or esophageal injury, stroke, pulmonary vein stenosis) and recovery expectations.  -patient agreeable to PVI ablation with Dr Richard.  EP team to arrange scheduling ablation and will reach out to patient to discuss surgery dates  -He was provides with educational handouts  re: ablation and Afib today       Essential hypertension    -Blood pressure 92/60, continue with current medication regimen  -Lisinopril 10 mg daily        [id number] score of 1 for HTN and on  Eliquis BID.    Follow up per EP team for PVI ablation with Dr Richard.     History of Present Illness/Subjective     Lan Manuel is a very pleasant 62 year old male who comes in today for EP consult AF, AFL    Lan Manuel has a known history of newly diagnosed atrial fibs/atrial flutter, essential hypertension, alcohol use, nicotine dependence.    Arrhythmia hx  Dx/date: 10/10/23, patient presented to his primary care provider for his wellness exam and reported not feeling well. Heart rate noted to be fast in clinic.  Patient denies any chest pain or shortness of breath.  Denies any lightheadedness.  Does not feel as though his heart is beating quickly necessarily. EKG confirmed Afib with Atrial flutter, rates 's  Sx: fatigue  Prior AAD, AV nadia blocking agents: amiodarone, metoprolol tartrate BID  Procedures  DCCV: no  Ablation: no    Patient presents to the [place] clinic today for PVI ablation consult.  His wife is present today.  Newly diagnosed A-fib and atrial flutter on October 10, he was hospitalized with rates between 80 and 150s.  He remains in normal sinus rhythm today with rates in the 60s, he remains on a rhythm control strategy consisting of amiodarone 200 mg twice daily + metoprolol tartrate twice daily.  He denies any recent chest pain or palpitations, lightheadedness or dizziness, presyncope or syncopal episodes, orthopnea or PND.  He does report mild shortness of breath on exertion pacifically when he is outside doing lawn work, he reports moderate fatigue that is persistent.  He denies any tremors, unsteady balance or vision changes since initiation of amiodarone.  He denies any bleeding issues since initiation of Eliquis which he remains on twice daily for stroke prevention.  He has been monitoring heart rates at home using oximeter and his Fitbit which his wife recently purchased for him.  He is prone to dehydration, he does not drink water at all throughout the day, only  chocolate milk.  Caffeine intake is moderate, typically drinks 3 cans of soda daily.  He has cut back on his alcohol intake, previously was drinking 15 cans of beer a day, is now down to 6 cans of beer daily.  He is smoking pack and a half of cigarettes daily and he does smoke marijuana daily.  DANA during his hospital stay did reveal a reduced LVEF between 30 and 35% likely tachycardia mediated from his new onset atrial fibrillation and flutter.     Cardiographics (reviewed):    10/13/23 normal sinus rhythm left atrial enlargement rate 66 bpm QRS duration 90 ms QT/QTc 444/465 ms      10/10/23 Atrial fibrillation rate 83 bpm         10/10/23      10/12/23 DANA  The left ventricle is normal in size.  The visual ejection fraction is 30-35%.  There is moderate global hypokinesia of the left ventricle.  Mildly decreased right ventricular systolic function  Trabeculations in MINDI but no clear thrombus.  ______________________________________     Problem List:  Patient Active Problem List   Diagnosis    Essential hypertension    Multiple rib fractures    Nicotine dependence    Left scapula fracture    Diverticulitis    Diverticulitis of colon    Appendicular diverticulum    Alcohol use    Adenomatous colon polyp    Ganglion    Atrial fibrillation with RVR (H)    New onset atrial fibrillation (H)    Atrial flutter, unspecified type (H)     Revi  e  Physical Examination Review of Systems   w fystems  There were no vitals taken for this visit.  There is no height or weight on file to calculate BMI.  Wt Readings from Last 3 Encounters:   10/14/23 78.5 kg (173 lb 1.6 oz)   10/12/23 80.4 kg (177 lb 4 oz)   10/10/23 82.7 kg (182 lb 4 oz)     General Appearance:   Alert, well-appearing and in no acute distress. Moderate fatigue noted.    HEENT: Atraumatic, normocephalic.  No scleral icterus, normal conjunctivae; mucous membranes pink and moist.     Chest: Chest symmetric, spine straight.   Lungs:   Respirations unlabored: Lungs are  clear to auscultation.   Cardiovascular:   Normal first and second heart sounds with no murmurs, rubs, or gallops.  Regular, regular.   Normal JVD, no edema.       Extremities: No cyanosis or clubbing   Musculoskeletal: Moves all extremities   Skin: Warm, dry, intact.    Neurologic: Mood and affect are appropriate, alert and oriented to person, place, time, and situation     ROS: 10 point ROS neg other than the symptoms noted above in the HPI.     Medical History  Surgical History Family History Social History     Past Medical History:   Diagnosis Date    Atrial fibrillation with RVR (H) 10/10/2023    Hypertension 2015    Past Surgical History:   Procedure Laterality Date    COLONOSCOPY N/A 3/2/2021    Procedure: COLONOSCOPY, WITH POLYPECTOMY AND BIOPSY;  Surgeon: Francis Chow MD;  Location: [facility]    IR LUMBAR EPIDURAL STEROID INJECTION  2007    Family History   Problem Relation Age of Onset    Myocardial Infarction Mother 37         of MI at 37    No Known Problems Father     Heart Disease Mother 37.00    Allergies Father     History   Smoking Status    Every Day    Packs/day: 1.00    Types: Cigarettes   Smokeless Tobacco    Never     Social History    Substance and Sexual Activity      Alcohol use: Yes        Comment: 8-12       Medications  Allergies     Current Outpatient Medications   Medication Sig Dispense Refill    amiodarone (PACERONE) 200 MG tablet Take 1 tablet (200 mg) by mouth 2 times daily 60 tablet 3    apixaban ANTICOAGULANT (ELIQUIS) 5 MG tablet Take 1 tablet (5 mg) by mouth 2 times daily 60 tablet 3    atorvastatin (LIPITOR) 40 MG tablet Take 1 tablet (40 mg) by mouth every evening 30 tablet 3    calcium carbonate (OS-EROS) 600 MG tablet Take 600 mg by mouth daily      cholecalciferol (VITAMIN D3) 10 mcg (400 units) TABS tablet Take 1,000 Units by mouth daily      lisinopril (ZESTRIL) 10 MG tablet TAKE 1 TABLET (10 MG) BY MOUTH DAILY (Patient taking differently: Take 10 mg by mouth  "daily) 90 tablet 3    metoprolol tartrate (LOPRESSOR) 25 MG tablet Take 1 tablet (25 mg) by mouth 2 times daily 60 tablet 3    Multiple Vitamins-Minerals (MULTIVITAMIN ADULTS 50+ PO) Take 1 tablet by mouth daily      Omega-3 Fatty Acids (FISH OIL) 1200 MG capsule Take 1,200 mg by mouth daily        No Known Allergies   Medical, surgical, family, social history, and medications were all reviewed and updated as necessary.   Lab Results    Chemistry/lipid CBC Cardiac Enzymes/BNP/TSH/INR   Recent Labs   Lab Test 10/13/23  0000   CHOL 147   HDL 60   LDL 65   TRIG 108     Recent Labs   Lab Test 10/13/23  0000 02/06/19  1518 05/22/17  1550   LDL 65 98 93     Recent Labs   Lab Test 10/14/23  0422      POTASSIUM 4.1   CHLORIDE 104   CO2 24   *   BUN 11.3   CR 0.83   GFRESTIMATED >90   EROS 9.2     Recent Labs   Lab Test 10/14/23  0422 10/13/23  0622 10/11/23  0543   CR 0.83 0.87 0.90     Recent Labs   Lab Test 10/13/23  0000 02/06/19  1518 05/22/17  1550   A1C 6.2* 5.5 5.7          Recent Labs   Lab Test 10/14/23  0422   WBC 8.8   HGB 15.6   HCT 46.7           Recent Labs   Lab Test 10/14/23  0422 10/13/23  0622 10/13/23  0000   HGB 15.6 15.1 16.0    No results for input(s): \"TROPONINI\" in the last 44404 hours.  Recent Labs   Lab Test 10/11/23  0543   NTBNPI 1,449*     Recent Labs   Lab Test 10/10/23  1620   TSH 2.49     Recent Labs   Lab Test 10/10/23  1620   INR 1.02          Total Time- 49 minutes spent on date of encounter doing chart review, history and exam, documentation and further activities as noted above.  This note has been dictated using voice recognition software. Any grammatical, typographical, or context distortions are unintentional and inherent to the software.    Lilo Wills [facility]  [phone]                       "

## 2023-10-19 ENCOUNTER — OFFICE VISIT (OUTPATIENT)
Dept: CARDIOLOGY | Facility: CLINIC | Age: 63
End: 2023-10-19
Payer: COMMERCIAL

## 2023-10-19 VITALS
BODY MASS INDEX: 26.61 KG/M2 | SYSTOLIC BLOOD PRESSURE: 92 MMHG | HEART RATE: 68 BPM | DIASTOLIC BLOOD PRESSURE: 60 MMHG | WEIGHT: 175 LBS | RESPIRATION RATE: 14 BRPM

## 2023-10-19 DIAGNOSIS — I48.91 NEW ONSET ATRIAL FIBRILLATION (H): Primary | ICD-10-CM

## 2023-10-19 DIAGNOSIS — I10 ESSENTIAL HYPERTENSION: ICD-10-CM

## 2023-10-19 DIAGNOSIS — I48.92 ATRIAL FLUTTER, UNSPECIFIED TYPE (H): ICD-10-CM

## 2023-10-19 PROCEDURE — 99215 OFFICE O/P EST HI 40 MIN: CPT | Performed by: NURSE PRACTITIONER

## 2023-10-19 NOTE — LETTER
10/19/2023    Jenniffer Matt MD  [address] [facility] [address]    RE: Lan Manuel       Dear Colleague,     I had the pleasure of seeing Lan Manuel in the [facility].    Thank you, Dr. Hastings, for asking the [facility] team to see . Lan Manuel to evaluate new  onset AF/AFL.    Assessment/Recommendations     Assessment/Plan:    Diagnoses and all orders for this visit:  New onset atrial fibrillation (H)  Atrial flutter, unspecified type (H)    -Amiodarone 200 mg BID  -metoprolol tartrate 25 mg BID  -He remains in normal sinus rhythm today with controlled heart rates in the 60s.  Continues to report persistent fatigue with some mild shortness of breath on exertion.  He denies any vision changes, tremors or unsteady balance since initiation of amiodarone.  He will continue to monitor heart rates at home using his Fitbit and or oximeter  -Patient was advised to continue working on reducing his ETOH intake, previously 15 beers daily, down to 6 beers daily currently. Continues to smoke marijuana daily. Prone to dehydration. Advised him to increase water intake to at least 40 to 60 ounces daily, currently drinks no water per day.   -We reviewed the pathophysiology of atrial fibrillation and management considerations including stroke risk and anticoagulation, rate control, cardioversion, antiarrhythmic drug therapy, and catheter ablation.  Antiarrhythmic drug options discussed. We discussed atrial fibrillation ablation procedures, anticipated success rates, the potential need for re-do ablation vs addition of anti-arrhythmic drugs, procedural risks (including groin bleeding, tamponade, phrenic or esophageal injury, stroke, pulmonary vein stenosis) and recovery expectations.  -patient agreeable to PVI ablation with Dr Richard.  EP team to arrange scheduling ablation and will reach out to patient to discuss surgery dates  -He was provides with  educational handouts  re: ablation and Afib today       Essential hypertension    -Blood pressure 92/60, continue with current medication regimen  -Lisinopril 10 mg daily        [id number] score of 1 for HTN and on Eliquis BID.    Follow up per EP team for PVI ablation with Dr Richard.     History of Present Illness/Subjective     Lan Manuel is a very pleasant 62 year old male who comes in today for EP consult AF, AFL    Lan Manuel has a known history of newly diagnosed atrial fibs/atrial flutter, essential hypertension, alcohol use, nicotine dependence.    Arrhythmia hx  Dx/date: 10/10/23, patient presented to his primary care provider for his wellness exam and reported not feeling well. Heart rate noted to be fast in clinic.  Patient denies any chest pain or shortness of breath.  Denies any lightheadedness.  Does not feel as though his heart is beating quickly necessarily. EKG confirmed Afib with Atrial flutter, rates 's  Sx: fatigue  Prior AAD, AV nadia blocking agents: amiodarone, metoprolol tartrate BID  Procedures  DCCV: no  Ablation: no    Patient presents to the [place] clinic today for PVI ablation consult.  His wife is present today.  Newly diagnosed A-fib and atrial flutter on October 10, he was hospitalized with rates between 80 and 150s.  He remains in normal sinus rhythm today with rates in the 60s, he remains on a rhythm control strategy consisting of amiodarone 200 mg twice daily + metoprolol tartrate twice daily.  He denies any recent chest pain or palpitations, lightheadedness or dizziness, presyncope or syncopal episodes, orthopnea or PND.  He does report mild shortness of breath on exertion pacifically when he is outside doing lawn work, he reports moderate fatigue that is persistent.  He denies any tremors, unsteady balance or vision changes since initiation of amiodarone.  He denies any bleeding issues since initiation of Eliquis which he remains on twice daily for stroke  prevention.  He has been monitoring heart rates at home using oximeter and his Fitbit which his wife recently purchased for him.  He is prone to dehydration, he does not drink water at all throughout the day, only chocolate milk.  Caffeine intake is moderate, typically drinks 3 cans of soda daily.  He has cut back on his alcohol intake, previously was drinking 15 cans of beer a day, is now down to 6 cans of beer daily.  He is smoking pack and a half of cigarettes daily and he does smoke marijuana daily.  DANA during his hospital stay did reveal a reduced LVEF between 30 and 35% likely tachycardia mediated from his new onset atrial fibrillation and flutter.     Cardiographics (reviewed):    10/13/23 normal sinus rhythm left atrial enlargement rate 66 bpm QRS duration 90 ms QT/QTc 444/465 ms      10/10/23 Atrial fibrillation rate 83 bpm         10/10/23      10/12/23 DANA  The left ventricle is normal in size.  The visual ejection fraction is 30-35%.  There is moderate global hypokinesia of the left ventricle.  Mildly decreased right ventricular systolic function  Trabeculations in MINDI but no clear thrombus.  ______________________________________     Problem List:  Patient Active Problem List   Diagnosis    Essential hypertension    Multiple rib fractures    Nicotine dependence    Left scapula fracture    Diverticulitis    Diverticulitis of colon    Appendicular diverticulum    Alcohol use    Adenomatous colon polyp    Ganglion    Atrial fibrillation with RVR (H)    New onset atrial fibrillation (H)    Atrial flutter, unspecified type (H)     Revi  e  Physical Examination Review of Systems   w [facility]  There were no vitals taken for this visit.  There is no height or weight on file to calculate BMI.  Wt Readings from Last 3 Encounters:   10/14/23 78.5 kg (173 lb 1.6 oz)   10/12/23 80.4 kg (177 lb 4 oz)   10/10/23 82.7 kg (182 lb 4 oz)     General Appearance:   Alert, well-appearing and in no acute distress. Moderate  fatigue noted.    HEENT: Atraumatic, normocephalic.  No scleral icterus, normal conjunctivae; mucous membranes pink and moist.     Chest: Chest symmetric, spine straight.   Lungs:   Respirations unlabored: Lungs are clear to auscultation.   Cardiovascular:   Normal first and second heart sounds with no murmurs, rubs, or gallops.  Regular, regular.   Normal JVD, no edema.       Extremities: No cyanosis or clubbing   Musculoskeletal: Moves all extremities   Skin: Warm, dry, intact.    Neurologic: Mood and affect are appropriate, alert and oriented to person, place, time, and situation     ROS: 10 point ROS neg other than the symptoms noted above in the HPI.     Medical History  Surgical History Family History Social History     Past Medical History:   Diagnosis Date    Atrial fibrillation with RVR (H) 10/10/2023    Hypertension 2015    Past Surgical History:   Procedure Laterality Date    COLONOSCOPY N/A 3/2/2021    Procedure: COLONOSCOPY, WITH POLYPECTOMY AND BIOPSY;  Surgeon: Francis Chow MD;  Location: [facility]    IR LUMBAR EPIDURAL STEROID INJECTION  2007    Family History   Problem Relation Age of Onset    Myocardial Infarction Mother 37         of MI at 37    No Known Problems Father     Heart Disease Mother 37.00    Allergies Father     History   Smoking Status    Every Day    Packs/day: 1.00    Types: Cigarettes   Smokeless Tobacco    Never     Social History    Substance and Sexual Activity      Alcohol use: Yes        Comment: 8-12       Medications  Allergies     Current Outpatient Medications   Medication Sig Dispense Refill    amiodarone (PACERONE) 200 MG tablet Take 1 tablet (200 mg) by mouth 2 times daily 60 tablet 3    apixaban ANTICOAGULANT (ELIQUIS) 5 MG tablet Take 1 tablet (5 mg) by mouth 2 times daily 60 tablet 3    atorvastatin (LIPITOR) 40 MG tablet Take 1 tablet (40 mg) by mouth every evening 30 tablet 3    calcium carbonate (OS-EROS) 600 MG tablet Take 600 mg by mouth daily       "cholecalciferol (VITAMIN D3) 10 mcg (400 units) TABS tablet Take 1,000 Units by mouth daily      lisinopril (ZESTRIL) 10 MG tablet TAKE 1 TABLET (10 MG) BY MOUTH DAILY (Patient taking differently: Take 10 mg by mouth daily) 90 tablet 3    metoprolol tartrate (LOPRESSOR) 25 MG tablet Take 1 tablet (25 mg) by mouth 2 times daily 60 tablet 3    Multiple Vitamins-Minerals (MULTIVITAMIN ADULTS 50+ PO) Take 1 tablet by mouth daily      Omega-3 Fatty Acids (FISH OIL) 1200 MG capsule Take 1,200 mg by mouth daily        No Known Allergies   Medical, surgical, family, social history, and medications were all reviewed and updated as necessary.   Lab Results    Chemistry/lipid CBC Cardiac Enzymes/BNP/TSH/INR   Recent Labs   Lab Test 10/13/23  0000   CHOL 147   HDL 60   LDL 65   TRIG 108     Recent Labs   Lab Test 10/13/23  0000 02/06/19  1518 05/22/17  1550   LDL 65 98 93     Recent Labs   Lab Test 10/14/23  0422      POTASSIUM 4.1   CHLORIDE 104   CO2 24   *   BUN 11.3   CR 0.83   GFRESTIMATED >90   EROS 9.2     Recent Labs   Lab Test 10/14/23  0422 10/13/23  0622 10/11/23  0543   CR 0.83 0.87 0.90     Recent Labs   Lab Test 10/13/23  0000 02/06/19  1518 05/22/17  1550   A1C 6.2* 5.5 5.7          Recent Labs   Lab Test 10/14/23  0422   WBC 8.8   HGB 15.6   HCT 46.7           Recent Labs   Lab Test 10/14/23  0422 10/13/23  0622 10/13/23  0000   HGB 15.6 15.1 16.0    No results for input(s): \"TROPONINI\" in the last [id number] hours.  Recent Labs   Lab Test 10/11/23  0543   NTBNPI 1,449*     Recent Labs   Lab Test 10/10/23  1620   TSH 2.49     Recent Labs   Lab Test 10/10/23  1620   INR 1.02          Total Time- 49 minutes spent on date of encounter doing chart review, history and exam, documentation and further activities as noted above.  This note has been dictated using voice recognition software. Any grammatical, typographical, or context distortions are unintentional and inherent to the " software.    Lilo Wills CNP  [facility]  [phone]                         Thank you for allowing me to participate in the care of your patient.      Sincerely,     Lilo Wills NP     [facility]  cc:   Adry Hastings PA-C  [address] [place] [address]

## 2023-10-19 NOTE — PATIENT INSTRUCTIONS
Lan Manuel,    It was a pleasure to see you today at the [facility].     My recommendations after this visit include:    Continue on current medications  One of the EP RN's will call you to discuss surgery date for your ablation with Dr Jose Manuel Wills CNP  [facility], Electrophysiology  [phone]  EP nurses [phone]

## 2023-10-23 ENCOUNTER — OFFICE VISIT (OUTPATIENT)
Dept: FAMILY MEDICINE | Facility: CLINIC | Age: 63
End: 2023-10-23
Payer: COMMERCIAL

## 2023-10-23 VITALS
HEART RATE: 67 BPM | BODY MASS INDEX: 26.91 KG/M2 | SYSTOLIC BLOOD PRESSURE: 110 MMHG | DIASTOLIC BLOOD PRESSURE: 78 MMHG | WEIGHT: 177 LBS | RESPIRATION RATE: 16 BRPM | OXYGEN SATURATION: 98 % | TEMPERATURE: 98 F

## 2023-10-23 DIAGNOSIS — I10 ESSENTIAL HYPERTENSION: ICD-10-CM

## 2023-10-23 DIAGNOSIS — Z09 HOSPITAL DISCHARGE FOLLOW-UP: Primary | ICD-10-CM

## 2023-10-23 DIAGNOSIS — I48.91 NEW ONSET ATRIAL FIBRILLATION (H): ICD-10-CM

## 2023-10-23 DIAGNOSIS — Z78.9 ALCOHOL USE: ICD-10-CM

## 2023-10-23 DIAGNOSIS — R73.03 PREDIABETES: ICD-10-CM

## 2023-10-23 DIAGNOSIS — I48.91 ATRIAL FIBRILLATION WITH RVR (H): ICD-10-CM

## 2023-10-23 DIAGNOSIS — F17.210 CIGARETTE NICOTINE DEPENDENCE WITHOUT COMPLICATION: ICD-10-CM

## 2023-10-23 PROCEDURE — 99495 TRANSJ CARE MGMT MOD F2F 14D: CPT | Performed by: NURSE PRACTITIONER

## 2023-10-23 NOTE — LETTER
October 23, 2023      Lan Manuel  [address] [place] [address]        To Whom It May Concern:    Lan HUNT Kaleb was seen in our clinic. He may return to work without restrictions.      Sincerely,        VICKIE Hendricks CNP

## 2023-10-23 NOTE — PROGRESS NOTES
Assessment & Plan   Problem List Items Addressed This Visit       Essential hypertension     BP Readings from Last 3 Encounters:   10/23/23 110/78   10/19/23 92/60   10/14/23 122/79   Blood pressure controlled, rate controlled,.  Medications: Lisinopril, metoprolol, amiodarone.  Was seen by cardiology last week.         Nicotine dependence     Patient continues to smoke cigarettes.  He is aware of the recommendation by the cardiologist smoking.  He feels that he is overwhelmed by his health changes and all the other changes that he needs to do given his diet, decrease alcohol use.  But he feels he is unable to try to quit smoking at this time.         Alcohol use     Patient is unhappily cutting down his beer consumption.  His wife states that he did drink too much last night.  He is not interested in chemical dependency or complete cessation of alcohol use at this time.           Atrial fibrillation with RVR (H)     Following with Cardiology, is making an appointment for ablation with EP cardiologist. Currently in normal sinus rhythm, normal rate, no issues with medications.   Medications include: Lisinopril, amiodarone, metoprolol, Eliquis.  Labs were normal when they were checked on 14 October.  No new labs needed today.         New onset atrial fibrillation (H)    Prediabetes     Lab Results   Component Value Date    A1C 6.2 10/13/2023    A1C 5.5 02/06/2019    A1C 5.7 05/22/2017   Briefly discussed insulin resistance and hyperinsulinemia and how this is a precursor to diabetes.  In the future he needs further education on this as he was not aware of the prediabetes diagnosis sometime diet and a handout was given to him to help with guidance.  Overall he is feeling overwhelmed with all these changes that he is being asked to make so I think future discussion around dietary changes and other lifestyle changes can happen in future visits.          Other Visit Diagnoses       Hospital discharge follow-up    -   "Primary        A work note was given to the patient for him to return to work we discussed taking it easy, if he was having difficulty or feeling very tired then we could consider half day or shorter day with restrictions.  He states that he feels he can restrict himself enough at his job without difficulties and if he needs that he is able to take the rest.    0956}   MED REC REQUIRED  Post Medication Reconciliation Status:  Discharge medications reconciled, continue medications without change  BMI:   Estimated body mass index is 26.91 kg/m  as calculated from the following:    Height as of 10/11/23: 1.727 m (5' 8\").    Weight as of this encounter: 80.3 kg (177 lb).       FUTURE APPOINTMENTS:       - Follow-up visit in 1-2 months    VICKIE Hendricks CNP  [facility] SHARI Lancaster is a 63 year old, presenting for the following health issues:  Hospital F/U      10/23/2023     8:57 AM   Additional Questions   Roomed by KIA Hernandez   Accompanied by spouse       HPI         10/16/2023    11:42 AM   Post Discharge Outreach   Admission Date 10/12/2023   Reason for Admission Atrial fibrillation with rapid ventricular response  HFrEF, severe with EF 30%   Discharge Date 10/14/2023   Discharge Diagnosis Atrial fibrillation with rapid ventricular response  HFrEF, severe with EF 30%   How are you doing now that you are home? \" I am doing pretty good\"   How are your symptoms? (Red Flag symptoms escalate to triage hotline per guidelines) Improved   Do you feel your condition is stable enough to be safe at home until your provider visit? Yes   Does the patient have their discharge instructions?  Yes   Does the patient have questions regarding their discharge instructions?  No   Were you started on any new medications or were there changes to any of your previous medications?  Yes   Does the patient have all of their medications? Yes   Do you have questions regarding any of your medications?  No "   Do you have all of your needed medical supplies or equipment (DME)?  (i.e. oxygen tank, CPAP, cane, etc.) Yes   Discharge follow-up appointment scheduled within 14 calendar days?  Yes   Discharge Follow Up Appointment Date 10/19/2023   Discharge Follow Up Appointment Scheduled with? Specialty Care Provider     Hospital Follow-up Visit:    Hospital/Nursing Home/IP Rehab Facility: [facility]  Date of Admission: 10/12/23  Date of Discharge: 10/14/23  Reason(s) for Admission: Atrial fibrillation with rapid ventricular response (H)     Was your hospitalization related to COVID-19? No   Problems taking medications regularly:  None  Medication changes since discharge: yes, med list UTD  Problems adhering to non-medication therapy:  None    Summary of hospitalization:  [facility] discharge summary reviewed  Diagnostic Tests/Treatments reviewed.  Follow up needed: Follow-up with cardiology and his primary care better in the near future.  He needs to have an appointment with the EP cardiologist for an ablation and that appointment will be made by his wife and him.  Other Healthcare Providers Involved in Patient s Care:         Specialist appointment - cardiology  Update since discharge: improved.         Plan of care communicated with patient and family  Patient is here for follow-up after a hospitalization of tachycardia, A-fib.  He was converted and is now in normal sinus rhythm.  He did spend 3 days in the hospital where he was not allowed to have alcoholic drink.  He is alcoholic and is drinking too much.  He states that he still is drinking a lot of beer every night.  But he has tried to cut it down.  His wife states that he drank too much last week because he did not eat.  He also is a smoker but is not interested at this time to quit.  He feels that he has too many changes that they are asking him to make a long time that he is not interested at this moment or feels that he  is able to cut another thing out.    Review of Systems   Constitutional:  Positive for fatigue.   Respiratory:  Positive for shortness of breath.             Objective    /78   Pulse 67   Temp 98  F (36.7  C) (Tympanic)   Resp 16   Wt 80.3 kg (177 lb)   SpO2 98%   BMI 26.91 kg/m    Body mass index is 26.91 kg/m .  Physical Exam  Constitutional:       Appearance: Normal appearance.   HENT:      Head: Normocephalic.      Right Ear: Tympanic membrane, ear canal and external ear normal.      Left Ear: Tympanic membrane, ear canal and external ear normal.      Nose: Nose normal.      Mouth/Throat:      Mouth: Mucous membranes are moist.      Pharynx: Oropharynx is clear.   Cardiovascular:      Rate and Rhythm: Normal rate and regular rhythm.      Heart sounds: Normal heart sounds.   Pulmonary:      Effort: Pulmonary effort is normal.      Breath sounds: Normal breath sounds.   Abdominal:      General: Bowel sounds are normal.      Palpations: Abdomen is soft.   Musculoskeletal:      Cervical back: Normal range of motion.   Lymphadenopathy:      Cervical: No cervical adenopathy.   Skin:     General: Skin is warm and dry.   Neurological:      Mental Status: He is alert and oriented to person, place, and time.   Psychiatric:         Mood and Affect: Mood normal.         Behavior: Behavior normal.         Thought Content: Thought content normal.         Judgment: Judgment normal.

## 2023-10-23 NOTE — ASSESSMENT & PLAN NOTE
Following with Cardiology, is making an appointment for ablation with EP cardiologist. Currently in normal sinus rhythm, normal rate, no issues with medications.   Medications include: Lisinopril, amiodarone, metoprolol, Eliquis.  Labs were normal when they were checked on 14 October.  No new labs needed today.

## 2023-10-24 PROBLEM — R73.03 PREDIABETES: Status: ACTIVE | Noted: 2023-10-24

## 2023-10-24 ASSESSMENT — ENCOUNTER SYMPTOMS
FATIGUE: 1
SHORTNESS OF BREATH: 1

## 2023-10-24 NOTE — ASSESSMENT & PLAN NOTE
BP Readings from Last 3 Encounters:   10/23/23 110/78   10/19/23 92/60   10/14/23 122/79     Blood pressure controlled, rate controlled,.  Medications: Lisinopril, metoprolol, amiodarone.  Was seen by cardiology last week.

## 2023-10-24 NOTE — ASSESSMENT & PLAN NOTE
Patient is unhappily cutting down his beer consumption.  His wife states that he did drink too much last night.  He is not interested in chemical dependency or complete cessation of alcohol use at this time.

## 2023-10-24 NOTE — ASSESSMENT & PLAN NOTE
Lab Results   Component Value Date    A1C 6.2 10/13/2023    A1C 5.5 02/06/2019    A1C 5.7 05/22/2017     Briefly discussed insulin resistance and hyperinsulinemia and how this is a precursor to diabetes.  In the future he needs further education on this as he was not aware of the prediabetes diagnosis sometime diet and a handout was given to him to help with guidance.  Overall he is feeling overwhelmed with all these changes that he is being asked to make so I think future discussion around dietary changes and other lifestyle changes can happen in future visits.

## 2023-10-24 NOTE — ASSESSMENT & PLAN NOTE
Patient continues to smoke cigarettes.  He is aware of the recommendation by the cardiologist smoking.  He feels that he is overwhelmed by his health changes and all the other changes that he needs to do given his diet, decrease alcohol use.  But he feels he is unable to try to quit smoking at this time.

## 2023-10-30 ENCOUNTER — PREP FOR PROCEDURE (OUTPATIENT)
Dept: CARDIOLOGY | Facility: CLINIC | Age: 63
End: 2023-10-30
Payer: COMMERCIAL

## 2023-10-30 ENCOUNTER — DOCUMENTATION ONLY (OUTPATIENT)
Dept: CARDIOLOGY | Facility: CLINIC | Age: 63
End: 2023-10-30
Payer: COMMERCIAL

## 2023-10-30 DIAGNOSIS — I48.91 NEW ONSET ATRIAL FIBRILLATION (H): Primary | ICD-10-CM

## 2023-10-30 RX ORDER — LIDOCAINE 40 MG/G
CREAM TOPICAL
Status: CANCELLED | OUTPATIENT
Start: 2023-10-30

## 2023-10-30 RX ORDER — SODIUM CHLORIDE 9 MG/ML
100 INJECTION, SOLUTION INTRAVENOUS CONTINUOUS
Status: CANCELLED | OUTPATIENT
Start: 2023-11-08

## 2023-10-30 NOTE — PROGRESS NOTES
H&P Date: Teach Date: Imaging None   PVI  Order Case Req Y  Order Set Y Lab/EKG  Orders [] Letter [] F/U RN Date:  NP follow-up Date:     AC Eliquis-CONTINUE   AAD Amiodarone-Continue   PPI/H2 Blocker Start Protonix 40mg Daily 3 days prior, 6wk post   Diuretics None   DM/GLP-1 DM Meds- None  GLP-1- None     1960  Home:[phone] (home) Cell:[phone] (mobile)  Emergency Contact: Adilene Fernandes [phone]  PCP: Jenniffer Matt, [phone]    Important patient information for CSC/Cath Lab staff : None    [facility] EP Cath Lab Procedure Order   Ablation Type:  PVI- Atrial Fibrillation  Ordering Provider: Dr Richard  Date Ordered and Prepped: 10/30/2023 Janie Cheung RN    Scheduling Information:  Anticipated Case Duration:  Standard ( Case per day SA 2:1, DW 4:1, KA 3:1)   Scheduling Timeframe:  As soon as possible  Scheduling Restrictions: None  Scheduling Contact: Please contact pt to schedule, if you are unable to schedule date within the next 24 hours please contact pt to update on scheduling process  EP RN Follow Up Apt: Schedule EP RN PC visit 3-4 days s/p PVI  MD Preference: Scheduling with ordering provider  Current Device/Device Co Needed for Procedure: None NoneNone  Pre-Procedural Testing needed: None  Mapping System Required:  SHERRON (Dr Richard)  ICE Needed:  Yes  Anesthesia:General Anesthesia    [facility] EP Cath Lab Prep   H&P:  Schedule H&P with EP HAN, RN Teach, and Labs within 30 days of PVI  Pre-op Labs: CBC, BMP, Beta HcG if appropriate, and INR if on Warfarin will be ordered AM of procedure, if not completed at pre-op H&P within 7 days of procedure.  T&S Pre-Procedure Review: Does not need for PVI procedures  Medical Records Pertinent for Procedure:   Cardioversion 10-13-23, DANA 10-13-23 EF 30-35%, and EKG 10-13-23 SR @66  Contrast Dye Allergies: None  GLP-1 Protocol: If on Dulaglutide (Trulicity) (weekly)- Injection hold 7 days prior to procedure  , Exenatide extended release (Bydureon  bcise) (weekly)- Injection hold 7 days prior to procedure, Exenatide (Byetta) (twice daily)- Oral Tablet hold day prior and morning of procedure and for Injection hold 7 days prior to procedure, Semaglutide (Ozempic) (weekly)- Injection and Oral hold 7 days prior to procedure, Liraglutide (Victoza, Saxenda) (daily)- Injection hold day prior and morning of procedure    No Known Allergies    Current Outpatient Medications:     amiodarone (PACERONE) 200 MG tablet, Take 1 tablet (200 mg) by mouth 2 times daily, Disp: 60 tablet, Rfl: 3    apixaban ANTICOAGULANT (ELIQUIS) 5 MG tablet, Take 1 tablet (5 mg) by mouth 2 times daily, Disp: 60 tablet, Rfl: 3    atorvastatin (LIPITOR) 40 MG tablet, Take 1 tablet (40 mg) by mouth every evening, Disp: 30 tablet, Rfl: 3    lisinopril (ZESTRIL) 10 MG tablet, TAKE 1 TABLET (10 MG) BY MOUTH DAILY, Disp: 90 tablet, Rfl: 3    metoprolol tartrate (LOPRESSOR) 25 MG tablet, Take 1 tablet (25 mg) by mouth 2 times daily, Disp: 60 tablet, Rfl: 3    Documentation Date:10/30/2023 12:40 PM  Janie Cheung RN

## 2023-10-30 NOTE — TELEPHONE ENCOUNTER
Tu Richard MD Westlund, Jessica, RN; [facility] Ep Support Pool - Lhe5 hours ago (7:58 AM)     SA Burnette,  I would leave him on amiodarone up to his procedure.  Thanks  Eugenie Fang, RN  Tu Richard MD5 days ago     JW  Can you clarify if you want amiodarone held and for how long?  Thanks!  Yomaira      You routed conversation to [facility] Ep Support Pool - e5 days ago      Tu Richard MD Duhamel, Kristine, DENISE; Eugenie Nicole, RN6 days ago     [facility] team,  Okay to move forward with scheduling patient as outlined in the note.  Thanks  Tu

## 2023-11-02 ENCOUNTER — VIRTUAL VISIT (OUTPATIENT)
Dept: CARDIOLOGY | Facility: CLINIC | Age: 63
End: 2023-11-02
Payer: COMMERCIAL

## 2023-11-02 DIAGNOSIS — I48.91 NEW ONSET ATRIAL FIBRILLATION (H): Primary | ICD-10-CM

## 2023-11-02 PROCEDURE — 99207 PR NO CHARGE NURSE ONLY: CPT

## 2023-11-02 RX ORDER — PANTOPRAZOLE SODIUM 40 MG/1
40 TABLET, DELAYED RELEASE ORAL DAILY
Qty: 46 TABLET | Refills: 0 | Status: SHIPPED | OUTPATIENT
Start: 2023-11-02 | End: 2023-12-21

## 2023-11-02 NOTE — PROGRESS NOTES
Pre-Procedure Pulmonary Vein Ablation (AF) Education    Procedure: PVI with Dr Richard on 11/8/23 with arrival time 5:30am    COVID: Pt denies COVID like symptoms, and is aware if he/she develops COVID like symptoms they would need to complete an at home with a rapid antigen COVID test 1-2 days prior to your procedure date. If COVID + pt is aware the procedure will need to be rescheduled, and to contact CV scheduling as soon as possible    Type & Screen: Is not required for PVI Ablation    Pre-Op H&P: Completed on 10/19/23 in Epic    Education:   Reviewed via phone with pt and spouse/caregiver  Pre-Procedure Instruction: NPO after midnight pre procedure, Defined NPO, Remove all jewelry and leave all valuables at home, Shower prior to arrival, Anesthesia and intubation plan/orders, Intra-procedure PVI process, Post- PVI procedure expectations/recovery, Transportation requirements and arrangements post procedure, Post-procedure follow up process, Letter sent to pt via [employer] and mail with written instructions (Refer to letters tab), Lab results would be called to pt if abnormal  Risks:   Atrial Fibrillation Ablation/Left Atrial Ablation  Cardiac Ablation  <1% Hypotension, Hemorrhage, Thrombophlebitis, Systemic or pulmonic emboli, Cardiac perforation (tamponade), Infection, Pneumothorax, Arrhythmias, Proarrhythmic effects of drugs, Radiation exposure, Catheter entrapment  <1 % Vascular injury including perforation of vein, artery or heart  1-2% Tamponade and Aortic puncture with left sided transeptal approach  1% CVA   <1% MI  <0.1% death  If external defibrillation or CV is needed, 25% risk for superficial burn  Risks associated with general anesthesia will be addressed by the Anesthesiology Department  Radiofrequency Risks:  In addition to standard risks for Radiofrequency Ablation, there is:  <2% Significant pulmonary vein stenosis  <2% Embolic events  <1% Esophageal fistula  <1% Phrenic nerve paralysis    Cryoablation Risks:  In addition to standard risks for Cryoablation Ablation, there is:  <1% Phrenic nerve paralysis  <1% Pulmonary vein stenosis  <1% Esophageal fistula    Medication:   Instructions regarding anticoagulants: Eliquis- Pt missed a dose of his Eliquis last month, discussed that I will forward to Dr. Richard and let him know.  Discussed pt may need another DANA AM of procedure.    Continue anticoagulation uninterrupted through their procedure, do not miss any doses of AC prior to procedure, importance of taking AC for stroke prevention, taking AC as prescribed, to call prior to PVI if missed a dose of AC, and if upon arrival pt reports missing a dose of AC PVI will potentially be cnx/postponed    Instructions given to pt regarding antiarrhythmic medication: Amiodarone- continue medication prior to procedure as prescribed  Instructions given to pt regarding PPI medication: Start Protonix 40mg Daily 3 days prior, 6wk post  Instructions given to pt regarding diuretics medication: None  Instructions given to pt regarding DM/GLP-1 medication:   DM- None  GLP-1- None  Instructions for medication, other than anticoagulants and antiarrhythmics listed above, given to pt: Take all medication AM of procedure with small sips of water     Important patient information for staff: None    11/2/2023 4:00 PM  Vashti Gómez RN

## 2023-11-03 NOTE — PROGRESS NOTES
Spoke to Dr. Richard regarding missed dose of Eliquis.  He is ok to move forward with out a repeat DANA as long as the pt does not miss another dose of his AC between now and the time of procedure.    Phone call to pt to discuss. Left message for return call at his wife's number which was requested yesterday.      Will await for call back.    Vashti

## 2023-11-03 NOTE — PROGRESS NOTES
Spoke with patient and left detailed VM for wife regarding information below.  No further issues or concerns.  Contact information left.  DILIA

## 2023-11-07 ENCOUNTER — ANESTHESIA EVENT (OUTPATIENT)
Dept: CARDIOLOGY | Facility: HOSPITAL | Age: 63
End: 2023-11-07
Payer: COMMERCIAL

## 2023-11-08 ENCOUNTER — HOSPITAL ENCOUNTER (OUTPATIENT)
Facility: HOSPITAL | Age: 63
Discharge: HOME | End: 2023-11-08
Attending: INTERNAL MEDICINE | Admitting: INTERNAL MEDICINE
Payer: COMMERCIAL

## 2023-11-08 ENCOUNTER — APPOINTMENT (OUTPATIENT)
Dept: RADIOLOGY | Facility: HOSPITAL | Age: 63
End: 2023-11-08
Attending: INTERNAL MEDICINE
Payer: COMMERCIAL

## 2023-11-08 ENCOUNTER — ANCILLARY PROCEDURE (OUTPATIENT)
Dept: ULTRASOUND IMAGING | Facility: HOSPITAL | Age: 63
End: 2023-11-08
Attending: ANESTHESIOLOGY
Payer: COMMERCIAL

## 2023-11-08 ENCOUNTER — ANESTHESIA (OUTPATIENT)
Dept: CARDIOLOGY | Facility: HOSPITAL | Age: 63
End: 2023-11-08
Payer: COMMERCIAL

## 2023-11-08 VITALS
SYSTOLIC BLOOD PRESSURE: 121 MMHG | TEMPERATURE: 98.3 F | BODY MASS INDEX: 26.57 KG/M2 | RESPIRATION RATE: 18 BRPM | WEIGHT: 175.3 LBS | HEART RATE: 63 BPM | HEIGHT: 68 IN | DIASTOLIC BLOOD PRESSURE: 78 MMHG | OXYGEN SATURATION: 96 %

## 2023-11-08 DIAGNOSIS — I48.91 NEW ONSET ATRIAL FIBRILLATION (H): ICD-10-CM

## 2023-11-08 DIAGNOSIS — I48.19 PERSISTENT ATRIAL FIBRILLATION (H): Primary | ICD-10-CM

## 2023-11-08 DIAGNOSIS — I48.91 ATRIAL FIBRILLATION WITH RAPID VENTRICULAR RESPONSE (H): ICD-10-CM

## 2023-11-08 LAB
ACT BLD: 334 SECONDS (ref 74–150)
ACT BLD: 363 SECONDS (ref 74–150)
ACT BLD: 509 SECONDS (ref 74–150)
ACT BLD: 596 SECONDS (ref 74–150)
ANION GAP SERPL CALCULATED.3IONS-SCNC: 10 MMOL/L (ref 7–15)
ATRIAL RATE - MUSE: 71 BPM
BUN SERPL-MCNC: 18.8 MG/DL (ref 8–23)
CALCIUM SERPL-MCNC: 9.7 MG/DL (ref 8.8–10.2)
CHLORIDE SERPL-SCNC: 107 MMOL/L (ref 98–107)
CREAT SERPL-MCNC: 0.84 MG/DL (ref 0.67–1.17)
DEPRECATED HCO3 PLAS-SCNC: 23 MMOL/L (ref 22–29)
DIASTOLIC BLOOD PRESSURE - MUSE: NORMAL MMHG
EGFRCR SERPLBLD CKD-EPI 2021: >90 ML/MIN/1.73M2
ERYTHROCYTE [DISTWIDTH] IN BLOOD BY AUTOMATED COUNT: 11.9 % (ref 10–15)
GLUCOSE SERPL-MCNC: 121 MG/DL (ref 70–99)
HCT VFR BLD AUTO: 44 % (ref 40–53)
HGB BLD-MCNC: 15.1 G/DL (ref 13.3–17.7)
INTERPRETATION ECG - MUSE: NORMAL
MCH RBC QN AUTO: 33 PG (ref 26.5–33)
MCHC RBC AUTO-ENTMCNC: 34.3 G/DL (ref 31.5–36.5)
MCV RBC AUTO: 96 FL (ref 78–100)
P AXIS - MUSE: 59 DEGREES
PLATELET # BLD AUTO: 159 10E3/UL (ref 150–450)
POTASSIUM SERPL-SCNC: 4.2 MMOL/L (ref 3.4–5.3)
POTASSIUM SERPL-SCNC: 4.3 MMOL/L (ref 3.4–5.3)
PR INTERVAL - MUSE: 166 MS
QRS DURATION - MUSE: 92 MS
QT - MUSE: 420 MS
QTC - MUSE: 456 MS
R AXIS - MUSE: 14 DEGREES
RBC # BLD AUTO: 4.57 10E6/UL (ref 4.4–5.9)
SODIUM SERPL-SCNC: 140 MMOL/L (ref 135–145)
SYSTOLIC BLOOD PRESSURE - MUSE: NORMAL MMHG
T AXIS - MUSE: 86 DEGREES
VENTRICULAR RATE- MUSE: 71 BPM
WBC # BLD AUTO: 5.9 10E3/UL (ref 4–11)

## 2023-11-08 PROCEDURE — C1732 CATH, EP, DIAG/ABL, 3D/VECT: HCPCS | Performed by: INTERNAL MEDICINE

## 2023-11-08 PROCEDURE — 71045 X-RAY EXAM CHEST 1 VIEW: CPT

## 2023-11-08 PROCEDURE — 93005 ELECTROCARDIOGRAM TRACING: CPT

## 2023-11-08 PROCEDURE — 80048 BASIC METABOLIC PNL TOTAL CA: CPT | Performed by: INTERNAL MEDICINE

## 2023-11-08 PROCEDURE — 250N000009 HC RX 250: Performed by: INTERNAL MEDICINE

## 2023-11-08 PROCEDURE — 250N000011 HC RX IP 250 OP 636: Mod: JZ | Performed by: NURSE ANESTHETIST, CERTIFIED REGISTERED

## 2023-11-08 PROCEDURE — C1759 CATH, INTRA ECHOCARDIOGRAPHY: HCPCS | Performed by: INTERNAL MEDICINE

## 2023-11-08 PROCEDURE — 93005 ELECTROCARDIOGRAM TRACING: CPT | Performed by: INTERNAL MEDICINE

## 2023-11-08 PROCEDURE — 84132 ASSAY OF SERUM POTASSIUM: CPT | Performed by: NURSE PRACTITIONER

## 2023-11-08 PROCEDURE — 93656 COMPRE EP EVAL ABLTJ ATR FIB: CPT | Performed by: INTERNAL MEDICINE

## 2023-11-08 PROCEDURE — 710N000010 HC RECOVERY PHASE 1, LEVEL 2, PER MIN

## 2023-11-08 PROCEDURE — 36415 COLL VENOUS BLD VENIPUNCTURE: CPT | Performed by: INTERNAL MEDICINE

## 2023-11-08 PROCEDURE — 370N000017 HC ANESTHESIA TECHNICAL FEE, PER MIN: Performed by: INTERNAL MEDICINE

## 2023-11-08 PROCEDURE — C1730 CATH, EP, 19 OR FEW ELECT: HCPCS | Performed by: INTERNAL MEDICINE

## 2023-11-08 PROCEDURE — 93655 ICAR CATH ABLTJ DSCRT ARRHYT: CPT | Performed by: INTERNAL MEDICINE

## 2023-11-08 PROCEDURE — 272N000001 HC OR GENERAL SUPPLY STERILE: Performed by: INTERNAL MEDICINE

## 2023-11-08 PROCEDURE — C1887 CATHETER, GUIDING: HCPCS | Performed by: INTERNAL MEDICINE

## 2023-11-08 PROCEDURE — 85347 COAGULATION TIME ACTIVATED: CPT | Mod: 91

## 2023-11-08 PROCEDURE — C1733 CATH, EP, OTHR THAN COOL-TIP: HCPCS | Performed by: INTERNAL MEDICINE

## 2023-11-08 PROCEDURE — 999N000054 HC STATISTIC EKG NON-CHARGEABLE

## 2023-11-08 PROCEDURE — C1894 INTRO/SHEATH, NON-LASER: HCPCS | Performed by: INTERNAL MEDICINE

## 2023-11-08 PROCEDURE — 250N000009 HC RX 250

## 2023-11-08 PROCEDURE — 250N000011 HC RX IP 250 OP 636: Performed by: INTERNAL MEDICINE

## 2023-11-08 PROCEDURE — 36415 COLL VENOUS BLD VENIPUNCTURE: CPT | Performed by: NURSE PRACTITIONER

## 2023-11-08 PROCEDURE — 93010 ELECTROCARDIOGRAM REPORT: CPT | Performed by: INTERNAL MEDICINE

## 2023-11-08 PROCEDURE — C1766 INTRO/SHEATH,STRBLE,NON-PEEL: HCPCS | Performed by: INTERNAL MEDICINE

## 2023-11-08 PROCEDURE — 258N000003 HC RX IP 258 OP 636: Performed by: INTERNAL MEDICINE

## 2023-11-08 PROCEDURE — 258N000003 HC RX IP 258 OP 636: Performed by: NURSE ANESTHETIST, CERTIFIED REGISTERED

## 2023-11-08 PROCEDURE — 250N000011 HC RX IP 250 OP 636

## 2023-11-08 PROCEDURE — 85014 HEMATOCRIT: CPT | Performed by: INTERNAL MEDICINE

## 2023-11-08 PROCEDURE — 250N000013 HC RX MED GY IP 250 OP 250 PS 637: Performed by: NURSE ANESTHETIST, CERTIFIED REGISTERED

## 2023-11-08 PROCEDURE — 250N000009 HC RX 250: Performed by: NURSE ANESTHETIST, CERTIFIED REGISTERED

## 2023-11-08 RX ORDER — NALOXONE HYDROCHLORIDE 0.4 MG/ML
0.4 INJECTION, SOLUTION INTRAMUSCULAR; INTRAVENOUS; SUBCUTANEOUS
Status: DISCONTINUED | OUTPATIENT
Start: 2023-11-08 | End: 2023-11-08 | Stop reason: HOSPADM

## 2023-11-08 RX ORDER — HYDROMORPHONE HYDROCHLORIDE 1 MG/ML
0.4 INJECTION, SOLUTION INTRAMUSCULAR; INTRAVENOUS; SUBCUTANEOUS EVERY 5 MIN PRN
Status: DISCONTINUED | OUTPATIENT
Start: 2023-11-08 | End: 2023-11-08 | Stop reason: HOSPADM

## 2023-11-08 RX ORDER — NALOXONE HYDROCHLORIDE 0.4 MG/ML
0.2 INJECTION, SOLUTION INTRAMUSCULAR; INTRAVENOUS; SUBCUTANEOUS
Status: DISCONTINUED | OUTPATIENT
Start: 2023-11-08 | End: 2023-11-08 | Stop reason: HOSPADM

## 2023-11-08 RX ORDER — SODIUM CHLORIDE, SODIUM LACTATE, POTASSIUM CHLORIDE, CALCIUM CHLORIDE 600; 310; 30; 20 MG/100ML; MG/100ML; MG/100ML; MG/100ML
INJECTION, SOLUTION INTRAVENOUS CONTINUOUS
Status: DISCONTINUED | OUTPATIENT
Start: 2023-11-08 | End: 2023-11-08 | Stop reason: HOSPADM

## 2023-11-08 RX ORDER — LIDOCAINE HYDROCHLORIDE 10 MG/ML
INJECTION, SOLUTION INFILTRATION; PERINEURAL PRN
Status: DISCONTINUED | OUTPATIENT
Start: 2023-11-08 | End: 2023-11-08

## 2023-11-08 RX ORDER — ONDANSETRON 2 MG/ML
4 INJECTION INTRAMUSCULAR; INTRAVENOUS EVERY 6 HOURS PRN
Status: DISCONTINUED | OUTPATIENT
Start: 2023-11-08 | End: 2023-11-08 | Stop reason: HOSPADM

## 2023-11-08 RX ORDER — ASPIRIN 81 MG/1
81 TABLET ORAL DAILY
COMMUNITY
End: 2024-02-21

## 2023-11-08 RX ORDER — FENTANYL CITRATE 50 UG/ML
50 INJECTION, SOLUTION INTRAMUSCULAR; INTRAVENOUS EVERY 5 MIN PRN
Status: DISCONTINUED | OUTPATIENT
Start: 2023-11-08 | End: 2023-11-08 | Stop reason: HOSPADM

## 2023-11-08 RX ORDER — FENTANYL CITRATE 50 UG/ML
25 INJECTION, SOLUTION INTRAMUSCULAR; INTRAVENOUS
Status: DISCONTINUED | OUTPATIENT
Start: 2023-11-08 | End: 2023-11-08 | Stop reason: HOSPADM

## 2023-11-08 RX ORDER — FENTANYL CITRATE 50 UG/ML
INJECTION, SOLUTION INTRAMUSCULAR; INTRAVENOUS PRN
Status: DISCONTINUED | OUTPATIENT
Start: 2023-11-08 | End: 2023-11-08

## 2023-11-08 RX ORDER — HEPARIN SODIUM 1000 [USP'U]/ML
INJECTION, SOLUTION INTRAVENOUS; SUBCUTANEOUS
Status: DISCONTINUED | OUTPATIENT
Start: 2023-11-08 | End: 2023-11-08 | Stop reason: HOSPADM

## 2023-11-08 RX ORDER — OXYCODONE HYDROCHLORIDE 5 MG/1
5 TABLET ORAL
Status: DISCONTINUED | OUTPATIENT
Start: 2023-11-08 | End: 2023-11-08 | Stop reason: HOSPADM

## 2023-11-08 RX ORDER — OXYCODONE HYDROCHLORIDE 5 MG/1
10 TABLET ORAL EVERY 4 HOURS PRN
Status: DISCONTINUED | OUTPATIENT
Start: 2023-11-08 | End: 2023-11-08 | Stop reason: HOSPADM

## 2023-11-08 RX ORDER — PROTAMINE SULFATE 10 MG/ML
INJECTION, SOLUTION INTRAVENOUS PRN
Status: DISCONTINUED | OUTPATIENT
Start: 2023-11-08 | End: 2023-11-08

## 2023-11-08 RX ORDER — ONDANSETRON 2 MG/ML
4 INJECTION INTRAMUSCULAR; INTRAVENOUS EVERY 30 MIN PRN
Status: DISCONTINUED | OUTPATIENT
Start: 2023-11-08 | End: 2023-11-08 | Stop reason: HOSPADM

## 2023-11-08 RX ORDER — SODIUM CHLORIDE 9 MG/ML
INJECTION, SOLUTION INTRAVENOUS CONTINUOUS PRN
Status: DISCONTINUED | OUTPATIENT
Start: 2023-11-08 | End: 2023-11-08

## 2023-11-08 RX ORDER — OXYCODONE HYDROCHLORIDE 5 MG/1
5 TABLET ORAL EVERY 4 HOURS PRN
Status: DISCONTINUED | OUTPATIENT
Start: 2023-11-08 | End: 2023-11-08 | Stop reason: HOSPADM

## 2023-11-08 RX ORDER — AMIODARONE HYDROCHLORIDE 200 MG/1
200 TABLET ORAL DAILY
Qty: 60 TABLET | Refills: 3 | Status: SHIPPED | OUTPATIENT
Start: 2023-11-08 | End: 2023-12-21

## 2023-11-08 RX ORDER — HYDROMORPHONE HYDROCHLORIDE 1 MG/ML
0.2 INJECTION, SOLUTION INTRAMUSCULAR; INTRAVENOUS; SUBCUTANEOUS EVERY 5 MIN PRN
Status: DISCONTINUED | OUTPATIENT
Start: 2023-11-08 | End: 2023-11-08 | Stop reason: HOSPADM

## 2023-11-08 RX ORDER — IPRATROPIUM BROMIDE AND ALBUTEROL SULFATE 2.5; .5 MG/3ML; MG/3ML
3 SOLUTION RESPIRATORY (INHALATION)
Status: DISCONTINUED | OUTPATIENT
Start: 2023-11-08 | End: 2023-11-08 | Stop reason: HOSPADM

## 2023-11-08 RX ORDER — ONDANSETRON 4 MG/1
4 TABLET, ORALLY DISINTEGRATING ORAL EVERY 30 MIN PRN
Status: DISCONTINUED | OUTPATIENT
Start: 2023-11-08 | End: 2023-11-08 | Stop reason: HOSPADM

## 2023-11-08 RX ORDER — LIDOCAINE 40 MG/G
CREAM TOPICAL
Status: DISCONTINUED | OUTPATIENT
Start: 2023-11-08 | End: 2023-11-08 | Stop reason: HOSPADM

## 2023-11-08 RX ORDER — KETAMINE HYDROCHLORIDE 10 MG/ML
INJECTION INTRAMUSCULAR; INTRAVENOUS PRN
Status: DISCONTINUED | OUTPATIENT
Start: 2023-11-08 | End: 2023-11-08

## 2023-11-08 RX ORDER — DEXAMETHASONE SODIUM PHOSPHATE 10 MG/ML
INJECTION, SOLUTION INTRAMUSCULAR; INTRAVENOUS PRN
Status: DISCONTINUED | OUTPATIENT
Start: 2023-11-08 | End: 2023-11-08

## 2023-11-08 RX ORDER — FUROSEMIDE 10 MG/ML
INJECTION INTRAMUSCULAR; INTRAVENOUS
Status: DISCONTINUED | OUTPATIENT
Start: 2023-11-08 | End: 2023-11-08 | Stop reason: HOSPADM

## 2023-11-08 RX ORDER — NICARDIPINE HCL-0.9% SOD CHLOR 1 MG/10 ML
SYRINGE (ML) INTRAVENOUS PRN
Status: DISCONTINUED | OUTPATIENT
Start: 2023-11-08 | End: 2023-11-08

## 2023-11-08 RX ORDER — ACETAMINOPHEN 325 MG/1
650 TABLET ORAL EVERY 4 HOURS PRN
Status: DISCONTINUED | OUTPATIENT
Start: 2023-11-08 | End: 2023-11-08 | Stop reason: HOSPADM

## 2023-11-08 RX ORDER — PROPOFOL 10 MG/ML
INJECTION, EMULSION INTRAVENOUS PRN
Status: DISCONTINUED | OUTPATIENT
Start: 2023-11-08 | End: 2023-11-08

## 2023-11-08 RX ORDER — ASPIRIN 81 MG/1
81 TABLET ORAL DAILY
Start: 2023-11-08

## 2023-11-08 RX ORDER — ALBUTEROL SULFATE 90 UG/1
AEROSOL, METERED RESPIRATORY (INHALATION) PRN
Status: DISCONTINUED | OUTPATIENT
Start: 2023-11-08 | End: 2023-11-08

## 2023-11-08 RX ORDER — SODIUM CHLORIDE 9 MG/ML
100 INJECTION, SOLUTION INTRAVENOUS CONTINUOUS
Status: DISCONTINUED | OUTPATIENT
Start: 2023-11-08 | End: 2023-11-08 | Stop reason: HOSPADM

## 2023-11-08 RX ORDER — OXYCODONE HYDROCHLORIDE 5 MG/1
10 TABLET ORAL
Status: DISCONTINUED | OUTPATIENT
Start: 2023-11-08 | End: 2023-11-08 | Stop reason: HOSPADM

## 2023-11-08 RX ORDER — HEPARIN SODIUM 10000 [USP'U]/100ML
INJECTION, SOLUTION INTRAVENOUS CONTINUOUS PRN
Status: DISCONTINUED | OUTPATIENT
Start: 2023-11-08 | End: 2023-11-08 | Stop reason: HOSPADM

## 2023-11-08 RX ORDER — FENTANYL CITRATE 50 UG/ML
25 INJECTION, SOLUTION INTRAMUSCULAR; INTRAVENOUS EVERY 5 MIN PRN
Status: DISCONTINUED | OUTPATIENT
Start: 2023-11-08 | End: 2023-11-08 | Stop reason: HOSPADM

## 2023-11-08 RX ORDER — IBUPROFEN 600 MG/1
600 TABLET, FILM COATED ORAL EVERY 6 HOURS PRN
Status: DISCONTINUED | OUTPATIENT
Start: 2023-11-08 | End: 2023-11-08 | Stop reason: HOSPADM

## 2023-11-08 RX ORDER — ONDANSETRON 4 MG/1
4 TABLET, ORALLY DISINTEGRATING ORAL EVERY 6 HOURS PRN
Status: DISCONTINUED | OUTPATIENT
Start: 2023-11-08 | End: 2023-11-08 | Stop reason: HOSPADM

## 2023-11-08 RX ORDER — ONDANSETRON 2 MG/ML
INJECTION INTRAMUSCULAR; INTRAVENOUS PRN
Status: DISCONTINUED | OUTPATIENT
Start: 2023-11-08 | End: 2023-11-08

## 2023-11-08 RX ADMIN — PHENYLEPHRINE HYDROCHLORIDE 100 MCG: 10 INJECTION INTRAVENOUS at 07:28

## 2023-11-08 RX ADMIN — ROCURONIUM BROMIDE 20 MG: 50 INJECTION, SOLUTION INTRAVENOUS at 09:28

## 2023-11-08 RX ADMIN — PROTAMINE SULFATE 75 MG: 10 INJECTION, SOLUTION INTRAVENOUS at 10:10

## 2023-11-08 RX ADMIN — ROCURONIUM BROMIDE 10 MG: 50 INJECTION, SOLUTION INTRAVENOUS at 09:53

## 2023-11-08 RX ADMIN — KETAMINE HYDROCHLORIDE 50 MG: 10 INJECTION INTRAMUSCULAR; INTRAVENOUS at 07:28

## 2023-11-08 RX ADMIN — FENTANYL CITRATE 25 MCG: 50 INJECTION INTRAMUSCULAR; INTRAVENOUS at 07:10

## 2023-11-08 RX ADMIN — ONDANSETRON 4 MG: 2 INJECTION INTRAMUSCULAR; INTRAVENOUS at 10:11

## 2023-11-08 RX ADMIN — SODIUM CHLORIDE: 9 INJECTION, SOLUTION INTRAVENOUS at 07:40

## 2023-11-08 RX ADMIN — PROPOFOL 120 MG: 10 INJECTION, EMULSION INTRAVENOUS at 07:28

## 2023-11-08 RX ADMIN — PHENYLEPHRINE HYDROCHLORIDE 0.1 MCG/KG/MIN: 10 INJECTION INTRAVENOUS at 07:49

## 2023-11-08 RX ADMIN — LIDOCAINE HYDROCHLORIDE 50 MG: 10 INJECTION, SOLUTION INFILTRATION; PERINEURAL at 07:28

## 2023-11-08 RX ADMIN — FENTANYL CITRATE 75 MCG: 50 INJECTION INTRAMUSCULAR; INTRAVENOUS at 07:28

## 2023-11-08 RX ADMIN — NICARDIPINE HYDROCHLORIDE 100 MCG: 0.1 INJECTION, SOLUTION INTRAVENOUS at 10:28

## 2023-11-08 RX ADMIN — PHENYLEPHRINE HYDROCHLORIDE 100 MCG: 10 INJECTION INTRAVENOUS at 07:49

## 2023-11-08 RX ADMIN — SODIUM CHLORIDE 100 ML/HR: 9 INJECTION, SOLUTION INTRAVENOUS at 06:34

## 2023-11-08 RX ADMIN — DEXAMETHASONE SODIUM PHOSPHATE 10 MG: 10 INJECTION, SOLUTION INTRAMUSCULAR; INTRAVENOUS at 07:28

## 2023-11-08 RX ADMIN — ALBUTEROL SULFATE 4 PUFF: 90 AEROSOL, METERED RESPIRATORY (INHALATION) at 08:23

## 2023-11-08 RX ADMIN — SODIUM CHLORIDE: 9 INJECTION, SOLUTION INTRAVENOUS at 08:00

## 2023-11-08 RX ADMIN — Medication 100 MG: at 07:28

## 2023-11-08 ASSESSMENT — COPD QUESTIONNAIRES: COPD: 1

## 2023-11-08 ASSESSMENT — ACTIVITIES OF DAILY LIVING (ADL)
ADLS_ACUITY_SCORE: 35

## 2023-11-08 NOTE — Clinical Note
The [employer] system 12 lead EKG, hemodynamics 5 lead, pulse oximetery, NIBP, [employer] hands off defibrillator/external pacer, with 3 monitoring leads to patient. Baseline assessment done.

## 2023-11-08 NOTE — ANESTHESIA CARE TRANSFER NOTE
Patient: Lan Manuel    Procedure: Procedure(s):  Ablation Atrial Fibrillation       Diagnosis: Atrial fibrillation  Diagnosis Additional Information: No value filed.    Anesthesia Type:   General     Note:    Oropharynx: oropharynx clear of all foreign objects  Level of Consciousness: drowsy  Oxygen Supplementation: face mask  Level of Supplemental Oxygen (L/min / FiO2): 8  Independent Airway: airway patency satisfactory and stable  Dentition: dentition unchanged  Vital Signs Stable: post-procedure vital signs reviewed and stable  Report to RN Given: handoff report given  Patient transferred to: PACU    Handoff Report: Identifed the Patient, Identified the Reponsible Provider, Reviewed the pertinent medical history, Discussed the surgical course, Reviewed Intra-OP anesthesia mangement and issues during anesthesia, Set expectations for post-procedure period and Allowed opportunity for questions and acknowledgement of understanding  Vitals:  Vitals Value Taken Time   BP     Temp     Pulse 74 11/08/23 1058   Resp 18 11/08/23 1058   SpO2 96 % 11/08/23 1058   Vitals shown include unfiled device data.    Electronically Signed By: VICKIE Patricia CRNA  November 8, 2023  11:00 AM

## 2023-11-08 NOTE — ANESTHESIA POSTPROCEDURE EVALUATION
Patient: Lan Manuel    Procedure: Procedure(s):  Ablation Atrial Fibrillation       Anesthesia Type:  General    Note:  Disposition: Outpatient   Postop Pain Control: Uneventful            Sign Out: Well controlled pain   PONV: No   Neuro/Psych: Uneventful            Sign Out: Acceptable/Baseline neuro status   Airway/Respiratory: Uneventful            Sign Out: Acceptable/Baseline resp. status   CV/Hemodynamics: Uneventful            Sign Out: Acceptable CV status; No obvious hypovolemia; No obvious fluid overload   Other NRE: NONE   DID A NON-ROUTINE EVENT OCCUR?            Last vitals:  Vitals Value Taken Time   /78 11/08/23 1300   Temp 36.8  C (98.3  F) 11/08/23 1100   Pulse 67 11/08/23 1324   Resp 16 11/08/23 1324   SpO2 95 % 11/08/23 1311   Vitals shown include unfiled device data.    Electronically Signed By: Beau Babb MD  November 8, 2023  1:26 PM

## 2023-11-08 NOTE — INTERVAL H&P NOTE
"I have reviewed the surgical (or preoperative) H&P that is linked to this encounter, and examined the patient. There are no significant changes    Clinical Conditions Present on Arrival:  Clinically Significant Risk Factors Present on Admission                # Drug Induced Coagulation Defect: home medication list includes an anticoagulant medication  # Drug Induced Platelet Defect: home medication list includes an antiplatelet medication  # Overweight: Estimated body mass index is 26.65 kg/m  as calculated from the following:    Height as of this encounter: 1.727 m (5' 8\").    Weight as of this encounter: 79.5 kg (175 lb 4.8 oz).       "

## 2023-11-08 NOTE — DISCHARGE INSTRUCTIONS
[facility]  Cardiac Electrophysiology  [address]  [place] [address]   Office: [phone]  Fax: [phone]     Cardiac Electrophysiology - Post Ablation Discharge Instructions      PROCEDURE   Atrial fibrillation ablation         MEDICATION INSTRUCTIONS   Continue taking your prior to procedure medications.  Continue taking your blood thinner Eliquis         DISCHARGE INSTRUCTIONS   General instructions  Have an adult stay with you until tomorrow.  You may resume your normal diet.    You may shower tomorrow.  Do NOT take a bath, or use a hot tub or pool for at least 1 week. Do not scrub the site. Do not use lotion or powder near the puncture site.    Groin care instructions  For the first 24 hrs - check the puncture site every 1-2 hours while awake.  You may keep a bandaid over the puncture sites for 1 or 2 days post-procedure and thereafter may keep these sites uncovered.  Change the bandaid daily.  If there is minor oozing, apply another bandaid and remove it after 12 hours.  For 2 days, when you cough, sneeze, laugh or move your bowels, hold your hand over the puncture site and press firmly.  Mild bruising at the access sites is normal.  If you notice increased swelling, external bleeding, or have other concerns regarding your access sites please consider emergency department evaluation and call your electrophysiology team's office    Activity recommendations  Do not drive for 3 days.  Avoid stooping or squatting more than 90 degrees at the hips for 7 days  Avoid repetitive motions such as loading , vacuuming, raking or shoveling  Avoid heavy lifting (greater than 25lbs) for 1 week    Post ablation instructions  You may have some irregular heartbeats following your ablation.  These may feel very strong and feel like atrial fibrillation re-initiation is imminent - these episodes should occur less frequently over time.  Recurrent atrial fibrillation can  occur within the first 3 months post ablation while your heart recovers from the procedure.  Pleuritic chest discomfort (chest pain worse with taking deep breaths, worse with laying flat on your back) can occur after ablation, usually coming about within the first 24-48hrs post ablation.  If this occurs and is severe enough to be troublesome to you, please call us and consider starting a course of ibuprofen 400mg three times daily for 5 to 7 days    Things to watch for  As with any type of procedure, please be more attentive to unusual symptoms post ablation (eg. fever, neurologic changes, pain with swallowing, loss of consciousness, etc) - we recommend ER evaluation for any such symptoms in the first few weeks post procedure.    Consider ER evaluation for the following:  Severe chest pain not relieved by Tylenol or Ibuprofen  You have chills or a fever greater than 101 F (38 C)  Neurologic changes (eg. leg, arm or face weakness or numbness, difficulties with speech or word finding, problems walking or with your balance, vision changes)  Severe difficulty swallowing and/or you are coughing up blood  Shortness of breath  Increased groin pain or a large or growing hard lump around the site  Groin is red, swollen, hot or tender  Blood or fluid is draining from the groin site  Any numbness, coolness or changes in color in your extremities  Groin pain not relieved by Tylenol or Advil  Recurrent atrial fibrillation associated with sustained rapid heart rates or associated with additional concerning symptoms.    Our office will have a follow-up visit scheduled for you in approximately 6 weeks.  Please do not hesitate to call us before that time should issues arise.        [facility]    [phone]    If you are calling after hours, please listen to the entire voicemail,   a live  will answer at the end of the message.

## 2023-11-08 NOTE — ANESTHESIA PREPROCEDURE EVALUATION
Anesthesia Pre-Procedure Evaluation    Patient: Lan Manuel   MRN: [id number] : 1960        Procedure : Procedure(s):  Ablation Atrial Fibrillation          Past Medical History:   Diagnosis Date    Atrial fibrillation with RVR (H) 10/10/2023    Hypertension 2015      Past Surgical History:   Procedure Laterality Date    COLONOSCOPY N/A 3/2/2021    Procedure: COLONOSCOPY, WITH POLYPECTOMY AND BIOPSY;  Surgeon: Francis Chow MD;  Location: [facility]    IR LUMBAR EPIDURAL STEROID INJECTION  2007      No Known Allergies   Social History     Tobacco Use    Smoking status: Every Day     Packs/day: 1     Types: Cigarettes    Smokeless tobacco: Never   Substance Use Topics    Alcohol use: Yes     Comment: 6/day      Wt Readings from Last 1 Encounters:   23 79.5 kg (175 lb 4.8 oz)        Anesthesia Evaluation   Pt has not had prior anesthetic         ROS/MED HX  ENT/Pulmonary:     (+)                         COPD,              Neurologic:  - neg neurologic ROS     Cardiovascular:     (+)  hypertension- -   -  - -   Taking blood thinners   CHF  Last EF: 20-25   MATTA.                           METS/Exercise Tolerance: >4 METS    Hematologic:  - neg hematologic  ROS     Musculoskeletal:  - neg musculoskeletal ROS     GI/Hepatic:  - neg GI/hepatic ROS     Renal/Genitourinary:  - neg Renal ROS     Endo:  - neg endo ROS     Psychiatric/Substance Use:     (+)   alcohol abuse      Infectious Disease:  - neg infectious disease ROS     Malignancy:  - neg malignancy ROS     Other:  - neg other ROS          Physical Exam    Airway  airway exam normal      Mallampati: II   TM distance: > 3 FB   Neck ROM: full   Mouth opening: > 3 cm    Respiratory Devices and Support         Dental  no notable dental history     (+) Multiple visibly decayed, broken teeth      Cardiovascular   cardiovascular exam normal          Pulmonary   pulmonary exam normal                OUTSIDE LABS:  CBC:   Lab Results   Component  "Value Date    WBC 5.9 11/08/2023    WBC 8.8 10/14/2023    HGB 15.1 11/08/2023    HGB 15.6 10/14/2023    HCT 44.0 11/08/2023    HCT 46.7 10/14/2023     11/08/2023     10/14/2023     BMP:   Lab Results   Component Value Date     11/08/2023     10/14/2023    POTASSIUM 4.2 11/08/2023    POTASSIUM 4.1 10/14/2023    CHLORIDE 107 11/08/2023    CHLORIDE 104 10/14/2023    CO2 23 11/08/2023    CO2 24 10/14/2023    BUN 18.8 11/08/2023    BUN 11.3 10/14/2023    CR 0.84 11/08/2023    CR 0.83 10/14/2023     (H) 11/08/2023     (H) 10/14/2023     COAGS:   Lab Results   Component Value Date    PTT 27 10/10/2023    INR 1.02 10/10/2023     POC: No results found for: \"BGM\", \"HCG\", \"HCGS\"  HEPATIC:   Lab Results   Component Value Date    ALBUMIN 3.8 10/11/2023    PROTTOTAL 5.9 (L) 10/11/2023    ALT 42 10/11/2023    AST 38 10/11/2023    ALKPHOS 67 10/11/2023    BILITOTAL 1.2 10/11/2023     OTHER:   Lab Results   Component Value Date    LACT 0.9 01/14/2021    A1C 6.2 (H) 10/13/2023    EROS 9.7 11/08/2023    PHOS 4.6 (H) 10/10/2023    MAG 2.0 10/10/2023    LIPASE 204 01/14/2021    TSH 2.49 10/10/2023       Anesthesia Plan    ASA Status:  4    NPO Status:  NPO Appropriate    Anesthesia Type: General.     - Airway: ETT   Induction: Intravenous, Propofol.   Maintenance: Balanced.   Techniques and Equipment:     - Lines/Monitors: Arterial Line, 2nd IV     Consents    Anesthesia Plan(s) and associated risks, benefits, and realistic alternatives discussed. Questions answered and patient/representative(s) expressed understanding.     - Discussed:     - Discussed with:  Patient, Spouse      - Extended Intubation/Ventilatory Support Discussed: No.      - Patient is DNR/DNI Status: No     Use of blood products discussed: No .     Postoperative Care    Pain management: IV analgesics, Multi-modal analgesia.     - Plan for long acting post-op opioid use   PONV prophylaxis: Ondansetron (or other 5HT-3), " Dexamethasone or Solumedrol, Droperidol or Haldol     Comments:    Other Comments: 50 mg ketamine IV on induction.              Beau Babb MD

## 2023-11-08 NOTE — ANESTHESIA PROCEDURE NOTES
Airway       Patient location during procedure: OR       Procedure Start/Stop Times: 11/8/2023 7:35 AM  Staff -        Anesthesiologist:  Beau Babb MD       CRNA: Fe Aviles APRN CRNA       Performed By: CRNA and anesthesiologistIndications and Patient Condition       Indications for airway management: naveen-procedural         Mask difficulty assessment: 2 - vent by mask + OA or adjuvant +/- NMBA    Final Airway Details       Final airway type: endotracheal airway       Successful airway: ETT - single  Endotracheal Airway Details        ETT size (mm): 7.5       Cuffed: yes       Successful intubation technique: direct laryngoscopy       Grade View of Cords: 1       Adjucts: stylet       Position: Right       Measured from: lips       Secured at (cm): 23       Bite block used: None    Post intubation assessment        Placement verified by: capnometry, equal breath sounds and chest rise        Number of attempts at approach: 1       Number of other approaches attempted: 0       Secured with: plastic tape       Ease of procedure: easy (Dr. Babb intubated)       Dentition: Intact and Unchanged    Medication(s) Administered   Medication Administration Time: 11/8/2023 7:35 AM

## 2023-11-08 NOTE — ANESTHESIA PROCEDURE NOTES
Arterial Line Procedure Note    Pre-Procedure   Staff -        Anesthesiologist:  Beau Babb MD       Performed By: anesthesiologist       Location: OR       Pre-Anesthestic Checklist: patient identified, IV checked, risks and benefits discussed, informed consent, monitors and equipment checked, pre-op evaluation and at physician/surgeon's request  Timeout:       Correct Patient: Yes        Correct Procedure: Yes        Correct Site: Yes        Correct Position: Yes   Line Placement:   This line was placed Pre Induction starting at 11/8/2023 7:16 AM and ending at 11/8/2023 7:26 AM  Procedure   Procedure: arterial line, new line and elective       Laterality: left       Insertion Site: radial.  Sterile Prep        Standard elements of sterile barrier followed       Skin prep: Chloraprep  Insertion/Injection        Technique: Seldinger Technique and ultrasound guided        1. Ultrasound was used to evaluate the access site.       2. Artery evaluated via ultrasound for patency/adequacy.       3. Using real-time ultrasound the needle/catheter was observed entering the artery/vein.       4. Permanent image was captured and entered into the patient's record.       5. The visualized structures were anatomically normal.       6. There were no apparent abnormal pathologic findings.       Catheter Type/Size: 20 G, 12 cm  Narrative         Secured by: suture       Tegaderm and Biopatch dressing used.       Complications: None apparent,        Arterial waveform: Yes

## 2023-11-10 ENCOUNTER — VIRTUAL VISIT (OUTPATIENT)
Dept: CARDIOLOGY | Facility: CLINIC | Age: 63
End: 2023-11-10
Payer: COMMERCIAL

## 2023-11-10 DIAGNOSIS — I48.91 ATRIAL FIBRILLATION WITH RAPID VENTRICULAR RESPONSE (H): Primary | ICD-10-CM

## 2023-11-10 PROCEDURE — 99207 PR NO CHARGE NURSE ONLY: CPT

## 2023-11-10 NOTE — PROGRESS NOTES
Post PVI Procedural Follow Up Call    Pt is s/p PVI from 11/8/23 with Dr Richard  PC was placed to pt, spoke to pt    General Assessment:     Weight: Pt reports pt is unable to report weight, but denies s/s of fluid retention    Pain: Pt denies generalized or localized pain abnormal to healing s/p     /GI: Pt denies difficulty swallowing, denies constipation, denies urinary retention/difficulty, reports no s/s of infection, report normal appetite, and reports staying hydrated.    Respiratory: Pt denies SOB, denies difficulty breathing, denies throat pain, denies changes/abnormal sputum, and denies any further symptoms abnormal to normal healing process s/p PVI.    Activity: Pt is tolerating advancement in activity while following physical restrictions, staying well hydrated, and gradually working into baseline activity.     Rhythm Assessment:   Pt denies palpitations, denies irregularities in HR or rhythm, and denies symptoms or sustained AF episodes.    Procedure Site Assessment:   Pts some bruising around sites without significant change from hospital discharge and normal to PVI recovery    Anticoagulation/Medication:  Pt remain on Eliquis without interruption  Pt confirms taking ASA 81mg Daily, and will continue taking this for 1 mo s/p    Education completed with pt at this visit:  Reviewed normal post-op PVI healing process, when to contact EP-RN/EP-MD, contact information was given to the pt for further concerns or questions, and pt verbalized understanding    Follow up  Pts AVS was printed and mailed to pt by scheduling team and pt will be seen by EP NP at 6 wks, monitor will be ordered at this follow-up if indicated as well as 3mo follow-up with either EP HAN or EP MD    11/10/2023 9:56 AM  Madalyn Cr RN

## 2023-11-10 NOTE — PATIENT INSTRUCTIONS
Your anticoagulation medication Eliquis:  It is important to remain on your anticoagulation medication uninterrupted after your ablation to reduce your risk of a stroke or heart attack, do not stop this medication  Please remain on your aspirin for 1 month, then you can stop    Healing from your pulmonary vein ablation:  Stay well hydrated, and increase your fluid intake during this recovery period  High protein foods aide in your bodies healing process  No aggressive or aerobic activity for 7-10 days, and do not lift more than 25 pounds for 7 days   Increase your activity gradually over the next 5-10 days, working back to your normal daily activity  If you are experiencing pain at your groin sites from the procedure, we advise applying ice for 20 minute durations 3-4 times per day     Please call me if any of the following occur:  Episodes of Atrial Fibrillation lasting greater than 4 hours, or if you notice the episodes are increasing in frequency or duration  If you develop shortness of breath, dizziness, or unresolving chest pains   Changes at your groin sites including swelling, hardening, drainage, increase in bruising, or an increase in pain  If you develop a temperature greater than 100.5 degrees (especially weeks 2-5 post   Procedure)    Call 911 if you are having symptoms of a stroke; difficulty with your speech, problems walking, difficulty with balance, vision disturbances, facial drooping or numbness, and muscle weakness on one side of your body     Your follow up appointments are as follows:  You will be seen by the electrophysiologist nurse practitioner at 6 weeks after your ablation  At your 6 week appointment, a 3 month follow-up appointment will be arranged with either the Nurse Practitioner or the Electrophysiology provider     Sincerely,  Madalyn Cr RN [phone]    After hours please contact the on call service at # [phone]

## 2023-11-13 ENCOUNTER — TELEPHONE (OUTPATIENT)
Dept: FAMILY MEDICINE | Facility: CLINIC | Age: 63
End: 2023-11-13

## 2023-11-13 NOTE — TELEPHONE ENCOUNTER
Patient refused this appointment as they wanted an appointment before Wednesday as they are going back that day and wanted to be seen before then. They will not wait to be seen before going back to work. They said since they were turned away this morning when they walked in expecting an appointment they will just go without period.

## 2023-11-13 NOTE — TELEPHONE ENCOUNTER
FYI-    Pt stopped in today thought he had an appt with Dr. Matt to follow up on procedure and hospital stay. Stated wife scheduled it on my chart because there was an email she got. Pts wife accidentally scheduled it for 10/24 for a annual visit instead. Patient stating he has to go back to work and so someone is going to see him.     Told patient he will have to call and talk to care team to talk to Dr Matt about getting in .

## 2023-11-13 NOTE — TELEPHONE ENCOUNTER
I could see him on Friday for an 1130 arrival - if he would like that appt please hold the virtual after lunch    Thanks    EB

## 2023-12-20 PROBLEM — Z86.79 S/P ABLATION OF ATRIAL FIBRILLATION: Status: ACTIVE | Noted: 2023-12-20

## 2023-12-20 PROBLEM — Z98.890 S/P ABLATION OF ATRIAL FIBRILLATION: Status: ACTIVE | Noted: 2023-12-20

## 2023-12-20 PROBLEM — I48.3 TYPICAL ATRIAL FLUTTER (H): Status: ACTIVE | Noted: 2023-10-18

## 2023-12-20 NOTE — PROGRESS NOTES
Thank you, Dr. Richard, for asking the [facility] team to see Mr. Lan Manuel to evaluate 6 weeks post ablation.    Assessment/Recommendations     Assessment/Plan:    Diagnoses and all orders for this visit:  Persistent atrial fibrillation (H)  Typical atrial flutter (H)  S/P ablation of atrial fibrillation  -Ablation of atrial fibrillation using RF x4 PV + R CTI 11.8. 23 performed by Dr Richard  -Amiodarone 200 mg daily + metoprolol tartrate 25 mg BID, ventricular rates well controlled in the 70's today.   -MCOT x14 days, he will plan on getting his monitor hooked up in approximately 4 weeks after amiodarone washout.  -Discussed cutting back on his alcohol intake, he has increased his alcohol intake once again, he is consuming a 12 pack of beer daily, also discussed increasing water intake, he is not taking much water intake and at this time, aim for goal of at least 60 ounces daily  -No ER visits since ablation      Essential hypertension  -Blood pressure 104/58, he was advised to continue with his current medication regimen which includes lisinopril 10 mg daily        [id number] score of 1 for HTN and on Eliquis BID.   Follow up in 9 weeks with Lilo Wills CNP for 3 month post ablation follow up, review MCOT results.     History of Present Illness/Subjective     Lan Manuel is a very pleasant 63 year old male who comes in today for EP follow up 6 weeks post ablation    Lan Manuel has a known history of newly diagnosed atrial fibs/atrial flutter, essential hypertension, alcohol use, nicotine dependence.     Arrhythmia hx  Dx/date: 10/10/23, patient presented to his primary care provider for his wellness exam and reported not feeling well. Heart rate noted to be fast in clinic.  Patient denies any chest pain or shortness of breath.  Denies any lightheadedness.  Does not feel as though his heart is beating quickly necessarily. EKG confirmed Afib with Atrial flutter, rates  's  Sx: fatigue  Prior AAD, AV nadia blocking agents: amiodarone, metoprolol tartrate BID  Procedures  DCCV: no  Ablation: Ablation of atrial fibrillation using RF x4 PV + R CTI 11.8. 23 performed by Dr Richard    Patient presents for his 6-week post ablation follow-up. Ablation of atrial fibrillation using RF x4 PV + R CTI 11.8. 23 performed by Dr Richard.  He remains on a rhythm control strategy consisting of amiodarone 200 mg daily in addition to metoprolol tartrate 25 mg twice daily.  His ventricular rates remain well-controlled in the 70s today.  He continues to monitor his rhythm and rates using his Apple Watch.  He notes 1 episode of atrial fibrillation occurring on Thanksgiving where his ventricular rates increased to 140s for less than 5 minutes but he remained asymptomatic during that episode.  He has had no additional atrial fibrillation since that episode occurred.  He denies any recent chest pain or palpitations, dizziness, shortness of breath on exertion or at rest or presyncope.  He denies any emergency department visits or complications with groin site access.  He remains on Eliquis twice daily for stroke prevention with no recurrence of bleeding.  He denies any tremors, unsteady balance or vision changes since initiation of amiodarone therapy.  He is prone to dehydration as he consumes very little water intake throughout the day.  He continues to smoke 1-1 and half packs of cigarettes daily with no plans of cessation.  Continues to consume 12 beers per day.  Continues to smoke marijuana daily.  Blood pressure remains normotensive at 104/58 today.       Cardiographics (reviewed):    11/8/23 NSR rate 71 bpm QT/QTC: 420/456 ms    10/13/23 normal sinus rhythm left atrial enlargement rate 66 bpm QRS duration 90 ms QT/QTc 444/465 ms       10/10/23 Atrial fibrillation rate 83 bpm         10/10/23       10/12/23 DANA  The left ventricle is normal in size.  The visual ejection fraction is 30-35%.  There is  moderate global hypokinesia of the left ventricle.  Mildly decreased right ventricular systolic function  Trabeculations in MINDI but no clear thrombus.       Problem List:  Patient Active Problem List   Diagnosis    Essential hypertension    Multiple rib fractures    Nicotine dependence    Left scapula fracture    Diverticulitis    Diverticulitis of colon    Appendicular diverticulum    Alcohol use    Adenomatous colon polyp    Ganglion    Atrial fibrillation with RVR (H)    New onset atrial fibrillation (H)    Typical atrial flutter (H)    Prediabetes    Persistent atrial fibrillation (H)    S/P ablation of atrial fibrillation     Revi  e  Physical Examination Review of Systems   w stems  There were no vitals taken for this visit.  There is no height or weight on file to calculate BMI.  Wt Readings from Last 3 Encounters:   11/08/23 79.5 kg (175 lb 4.8 oz)   10/23/23 80.3 kg (177 lb)   10/19/23 79.4 kg (175 lb)     General Appearance:   Alert, well-appearing and in no acute distress.   HEENT: Atraumatic, normocephalic.  No scleral icterus, normal conjunctivae; mucous membranes pink and moist.     Chest: Chest symmetric, spine straight.   Lungs:   Respirations unlabored: Lungs are clear to auscultation.   Cardiovascular:   Normal first and second heart sounds with no murmurs, rubs, or gallops.  Regular, regular.   Normal JVD, no edema.       Extremities: No cyanosis or clubbing   Musculoskeletal: Moves all extremities   Skin: Warm, dry, intact.    Neurologic: Mood and affect are appropriate, alert and oriented to person, place, time, and situation     ROS: 10 point ROS neg other than the symptoms noted above in the HPI.     Medical History  Surgical History Family History Social History     Past Medical History:   Diagnosis Date    Atrial fibrillation with RVR (H) 10/10/2023    Hypertension 8/9/2015    Past Surgical History:   Procedure Laterality Date    COLONOSCOPY N/A 3/2/2021    Procedure: COLONOSCOPY, WITH  POLYPECTOMY AND BIOPSY;  Surgeon: Francis Chow MD;  Location: [facility]    EP ABLATION PULMONARY VEIN ISOLATION N/A 2023    Procedure: Ablation Atrial Fibrillation;  Surgeon: Tu Richard MD;  Location: [facility] LAB CV    IR LUMBAR EPIDURAL STEROID INJECTION  2007    Family History   Problem Relation Age of Onset    Myocardial Infarction Mother 37         of MI at 37    No Known Problems Father     Heart Disease Mother 37.00    Allergies Father     History   Smoking Status    Every Day    Packs/day: 1.00    Types: Cigarettes   Smokeless Tobacco    Never     Social History    Substance and Sexual Activity      Alcohol use: Yes        Comment: 6/day       Medications  Allergies     Current Outpatient Medications   Medication Sig Dispense Refill    amiodarone (PACERONE) 200 MG tablet Take 1 tablet (200 mg) by mouth daily 60 tablet 3    apixaban ANTICOAGULANT (ELIQUIS) 5 MG tablet Take 1 tablet (5 mg) by mouth 2 times daily 60 tablet 3    aspirin 81 MG EC tablet Take 81 mg by mouth daily      aspirin 81 MG EC tablet Take 1 tablet (81 mg) by mouth daily      atorvastatin (LIPITOR) 40 MG tablet Take 1 tablet (40 mg) by mouth every evening 30 tablet 3    lisinopril (ZESTRIL) 10 MG tablet TAKE 1 TABLET (10 MG) BY MOUTH DAILY 90 tablet 3    metoprolol tartrate (LOPRESSOR) 25 MG tablet Take 1 tablet (25 mg) by mouth 2 times daily 60 tablet 3    pantoprazole (PROTONIX) 40 MG EC tablet Take 1 tablet (40 mg) by mouth daily 46 tablet 0      No Known Allergies   Medical, surgical, family, social history, and medications were all reviewed and updated as necessary.   Lab Results    Chemistry/lipid CBC Cardiac Enzymes/BNP/TSH/INR   Recent Labs   Lab Test 10/13/23  0000   CHOL 147   HDL 60   LDL 65   TRIG 108     Recent Labs   Lab Test 10/13/23  0000 19  1518 17  1550   LDL 65 98 93     Recent Labs   Lab Test 23  1405 23  0557   NA  --  140   POTASSIUM 4.3 4.2   CHLORIDE  --  107   CO2  --   "23   GLC  --  121*   BUN  --  18.8   CR  --  0.84   GFRESTIMATED  --  >90   EROS  --  9.7     Recent Labs   Lab Test 11/08/23  0557 10/14/23  0422 10/13/23  0622   CR 0.84 0.83 0.87     Recent Labs   Lab Test 10/13/23  0000 02/06/19  1518 05/22/17  1550   A1C 6.2* 5.5 5.7          Recent Labs   Lab Test 11/08/23  0557   WBC 5.9   HGB 15.1   HCT 44.0   MCV 96        Recent Labs   Lab Test 11/08/23  0557 10/14/23  0422 10/13/23  0622   HGB 15.1 15.6 15.1    No results for input(s): \"TROPONINI\" in the last 63216 hours.  Recent Labs   Lab Test 10/11/23  0543   NTBNPI 1,449*     Recent Labs   Lab Test 10/10/23  1620   TSH 2.49     Recent Labs   Lab Test 10/10/23  1620   INR 1.02          Total Time- 37 minutes spent on date of encounter doing chart review, history and exam, documentation and further activities as noted above.  This note has been dictated using voice recognition software. Any grammatical, typographical, or context distortions are unintentional and inherent to the software.    Lilo Wills [facility]  [phone]                       "

## 2023-12-21 ENCOUNTER — OFFICE VISIT (OUTPATIENT)
Dept: CARDIOLOGY | Facility: CLINIC | Age: 63
End: 2023-12-21
Payer: COMMERCIAL

## 2023-12-21 VITALS
WEIGHT: 184 LBS | DIASTOLIC BLOOD PRESSURE: 58 MMHG | HEIGHT: 67 IN | SYSTOLIC BLOOD PRESSURE: 104 MMHG | RESPIRATION RATE: 16 BRPM | BODY MASS INDEX: 28.88 KG/M2 | HEART RATE: 78 BPM

## 2023-12-21 DIAGNOSIS — Z86.79 S/P ABLATION OF ATRIAL FIBRILLATION: ICD-10-CM

## 2023-12-21 DIAGNOSIS — I48.91 ATRIAL FIBRILLATION WITH RAPID VENTRICULAR RESPONSE (H): ICD-10-CM

## 2023-12-21 DIAGNOSIS — I48.3 TYPICAL ATRIAL FLUTTER (H): ICD-10-CM

## 2023-12-21 DIAGNOSIS — I10 ESSENTIAL HYPERTENSION: ICD-10-CM

## 2023-12-21 DIAGNOSIS — Z98.890 S/P ABLATION OF ATRIAL FIBRILLATION: ICD-10-CM

## 2023-12-21 DIAGNOSIS — I48.19 PERSISTENT ATRIAL FIBRILLATION (H): Primary | ICD-10-CM

## 2023-12-21 PROCEDURE — 99214 OFFICE O/P EST MOD 30 MIN: CPT | Performed by: NURSE PRACTITIONER

## 2023-12-21 RX ORDER — METOPROLOL TARTRATE 25 MG/1
25 TABLET, FILM COATED ORAL 2 TIMES DAILY
Qty: 180 TABLET | Refills: 3 | Status: SHIPPED | OUTPATIENT
Start: 2023-12-21 | End: 2024-02-22

## 2023-12-21 NOTE — LETTER
12/21/2023    Jenniffer Matt [place]  [id number] [facility] [id number]    RE: Lan Manuel       Dear Colleague,     I had the pleasure of seeing Lan Manuel in the [facility].    Thank you, Dr. Richard, for asking the [facility] team to see Mr. Lan Manuel to evaluate 6 weeks post ablation.    Assessment/Recommendations     Assessment/Plan:    Diagnoses and all orders for this visit:  Persistent atrial fibrillation (H)  Typical atrial flutter (H)  S/P ablation of atrial fibrillation  -Ablation of atrial fibrillation using RF x4 PV + R CTI 11.8. 23 performed by Dr Richard  -Amiodarone 200 mg daily + metoprolol tartrate 25 mg BID, ventricular rates well controlled in the 70's today.   -MCOT x14 days, he will plan on getting his monitor hooked up in approximately 4 weeks after amiodarone washout.  -Discussed cutting back on his alcohol intake, he has increased his alcohol intake once again, he is consuming a 12 pack of beer daily, also discussed increasing water intake, he is not taking much water intake and at this time, aim for goal of at least 60 ounces daily  -No ER visits since ablation      Essential hypertension  -Blood pressure 104/58, he was advised to continue with his current medication regimen which includes lisinopril 10 mg daily        [id number] score of 1 for HTN and on Eliquis BID.   Follow up in 9 weeks with Lilo Wills CNP for 3 month post ablation follow up, review MCOT results.     History of Present Illness/Subjective     Lan Manuel is a very pleasant 63 year old male who comes in today for EP follow up 6 weeks post ablation    Lan Manuel has a known history of newly diagnosed atrial fibs/atrial flutter, essential hypertension, alcohol use, nicotine dependence.     Arrhythmia hx  Dx/date: 10/10/23, patient presented to his primary care provider for his wellness exam and reported not feeling well. Heart rate noted  to be fast in clinic.  Patient denies any chest pain or shortness of breath.  Denies any lightheadedness.  Does not feel as though his heart is beating quickly necessarily. EKG confirmed Afib with Atrial flutter, rates 's  Sx: fatigue  Prior AAD, AV nadia blocking agents: amiodarone, metoprolol tartrate BID  Procedures  DCCV: no  Ablation: Ablation of atrial fibrillation using RF x4 PV + R CTI 11.8. 23 performed by Dr Richard    Patient presents for his 6-week post ablation follow-up. Ablation of atrial fibrillation using RF x4 PV + R CTI 11.8. 23 performed by Dr Richard.  He remains on a rhythm control strategy consisting of amiodarone 200 mg daily in addition to metoprolol tartrate 25 mg twice daily.  His ventricular rates remain well-controlled in the 70s today.  He continues to monitor his rhythm and rates using his Apple Watch.  He notes 1 episode of atrial fibrillation occurring on Thanksgiving where his ventricular rates increased to 140s for less than 5 minutes but he remained asymptomatic during that episode.  He has had no additional atrial fibrillation since that episode occurred.  He denies any recent chest pain or palpitations, dizziness, shortness of breath on exertion or at rest or presyncope.  He denies any emergency department visits or complications with groin site access.  He remains on Eliquis twice daily for stroke prevention with no recurrence of bleeding.  He denies any tremors, unsteady balance or vision changes since initiation of amiodarone therapy.  He is prone to dehydration as he consumes very little water intake throughout the day.  He continues to smoke 1-1 and half packs of cigarettes daily with no plans of cessation.  Continues to consume 12 beers per day.  Continues to smoke marijuana daily.  Blood pressure remains normotensive at 104/58 today.       Cardiographics (reviewed):    11/8/23 NSR rate 71 bpm QT/QTC: 420/456 ms    10/13/23 normal sinus rhythm left atrial enlargement  rate 66 bpm QRS duration 90 ms QT/QTc 444/465 ms       10/10/23 Atrial fibrillation rate 83 bpm         10/10/23       10/12/23 DANA  The left ventricle is normal in size.  The visual ejection fraction is 30-35%.  There is moderate global hypokinesia of the left ventricle.  Mildly decreased right ventricular systolic function  Trabeculations in MINDI but no clear thrombus.       Problem List:  Patient Active Problem List   Diagnosis    Essential hypertension    Multiple rib fractures    Nicotine dependence    Left scapula fracture    Diverticulitis    Diverticulitis of colon    Appendicular diverticulum    Alcohol use    Adenomatous colon polyp    Ganglion    Atrial fibrillation with RVR (H)    New onset atrial fibrillation (H)    Typical atrial flutter (H)    Prediabetes    Persistent atrial fibrillation (H)    S/P ablation of atrial fibrillation     Revi  e  Physical Examination Review of Systems   w [facility]  There were no vitals taken for this visit.  There is no height or weight on file to calculate BMI.  Wt Readings from Last 3 Encounters:   11/08/23 79.5 kg (175 lb 4.8 oz)   10/23/23 80.3 kg (177 lb)   10/19/23 79.4 kg (175 lb)     General Appearance:   Alert, well-appearing and in no acute distress.   HEENT: Atraumatic, normocephalic.  No scleral icterus, normal conjunctivae; mucous membranes pink and moist.     Chest: Chest symmetric, spine straight.   Lungs:   Respirations unlabored: Lungs are clear to auscultation.   Cardiovascular:   Normal first and second heart sounds with no murmurs, rubs, or gallops.  Regular, regular.   Normal JVD, no edema.       Extremities: No cyanosis or clubbing   Musculoskeletal: Moves all extremities   Skin: Warm, dry, intact.    Neurologic: Mood and affect are appropriate, alert and oriented to person, place, time, and situation     ROS: 10 point ROS neg other than the symptoms noted above in the HPI.     Medical History  Surgical History Family History Social History     Past  Medical History:   Diagnosis Date    Atrial fibrillation with RVR (H) 10/10/2023    Hypertension 2015    Past Surgical History:   Procedure Laterality Date    COLONOSCOPY N/A 3/2/2021    Procedure: COLONOSCOPY, WITH POLYPECTOMY AND BIOPSY;  Surgeon: Francis Chow MD;  Location: [facility]    EP ABLATION PULMONARY VEIN ISOLATION N/A 2023    Procedure: Ablation Atrial Fibrillation;  Surgeon: Tu Richard MD;  Location: [facility] CATH LAB CV    IR LUMBAR EPIDURAL STEROID INJECTION  2007    Family History   Problem Relation Age of Onset    Myocardial Infarction Mother 37         of MI at 37    No Known Problems Father     Heart Disease Mother 37.00    Allergies Father     History   Smoking Status    Every Day    Packs/day: 1.00    Types: Cigarettes   Smokeless Tobacco    Never     Social History    Substance and Sexual Activity      Alcohol use: Yes        Comment: 6/day       Medications  Allergies     Current Outpatient Medications   Medication Sig Dispense Refill    amiodarone (PACERONE) 200 MG tablet Take 1 tablet (200 mg) by mouth daily 60 tablet 3    apixaban ANTICOAGULANT (ELIQUIS) 5 MG tablet Take 1 tablet (5 mg) by mouth 2 times daily 60 tablet 3    aspirin 81 MG EC tablet Take 81 mg by mouth daily      aspirin 81 MG EC tablet Take 1 tablet (81 mg) by mouth daily      atorvastatin (LIPITOR) 40 MG tablet Take 1 tablet (40 mg) by mouth every evening 30 tablet 3    lisinopril (ZESTRIL) 10 MG tablet TAKE 1 TABLET (10 MG) BY MOUTH DAILY 90 tablet 3    metoprolol tartrate (LOPRESSOR) 25 MG tablet Take 1 tablet (25 mg) by mouth 2 times daily 60 tablet 3    pantoprazole (PROTONIX) 40 MG EC tablet Take 1 tablet (40 mg) by mouth daily 46 tablet 0      No Known Allergies   Medical, surgical, family, social history, and medications were all reviewed and updated as necessary.   Lab Results    Chemistry/lipid CBC Cardiac Enzymes/BNP/TSH/INR   Recent Labs   Lab Test 10/13/23  0000   CHOL 147   HDL 60   LDL 65  "  TRIG 108     Recent Labs   Lab Test 10/13/23  0000 02/06/19  1518 05/22/17  1550   LDL 65 98 93     Recent Labs   Lab Test 11/08/23  1405 11/08/23  0557   NA  --  140   POTASSIUM 4.3 4.2   CHLORIDE  --  107   CO2  --  23   GLC  --  121*   BUN  --  18.8   CR  --  0.84   GFRESTIMATED  --  >90   EROS  --  9.7     Recent Labs   Lab Test 11/08/23  0557 10/14/23  0422 10/13/23  0622   CR 0.84 0.83 0.87     Recent Labs   Lab Test 10/13/23  0000 02/06/19  1518 05/22/17  1550   A1C 6.2* 5.5 5.7          Recent Labs   Lab Test 11/08/23  0557   WBC 5.9   HGB 15.1   HCT 44.0   MCV 96        Recent Labs   Lab Test 11/08/23  0557 10/14/23  0422 10/13/23  0622   HGB 15.1 15.6 15.1    No results for input(s): \"TROPONINI\" in the last 63578 hours.  Recent Labs   Lab Test 10/11/23  0543   NTBNPI 1,449*     Recent Labs   Lab Test 10/10/23  1620   TSH 2.49     Recent Labs   Lab Test 10/10/23  1620   INR 1.02          Total Time- 37 minutes spent on date of encounter doing chart review, history and exam, documentation and further activities as noted above.  This note has been dictated using voice recognition software. Any grammatical, typographical, or context distortions are unintentional and inherent to the software.    Lilo Wills CNP  [facility]  [phone]                         Thank you for allowing me to participate in the care of your patient.      Sincerely,     Lilo Wills NP     [facility]  cc:   No referring provider defined for this encounter.      "

## 2023-12-21 NOTE — PATIENT INSTRUCTIONS
Lan Manuel,    It was a pleasure to see you today at the [facility].     My recommendations after this visit include:    Stop amiodarone going forward, we need this to wash out of your system for at least one month. Then get your cardiac event monitor hooked up over at [facility] and wear it for 2 weeks. Then mail it back. This will look for any recurrence of Afib or Aflutter post ablation.   Continue on your Eliquis and metoprolol for now  I will see you back in clinic in 9 weeks for your next follow up and we will go over your monitor results at that time. Call the EP RN line listed below if your run into any issues with your Afib prior to your next appointment    Lilo Wills CNP  [facility], Electrophysiology  [phone]  EP nurses [phone]

## 2024-01-22 ENCOUNTER — HOSPITAL ENCOUNTER (OUTPATIENT)
Dept: CARDIOLOGY | Facility: HOSPITAL | Age: 64
Discharge: HOME | End: 2024-01-22
Attending: NURSE PRACTITIONER
Payer: COMMERCIAL

## 2024-01-22 DIAGNOSIS — I48.19 PERSISTENT ATRIAL FIBRILLATION (H): ICD-10-CM

## 2024-01-22 DIAGNOSIS — I48.3 TYPICAL ATRIAL FLUTTER (H): ICD-10-CM

## 2024-01-22 DIAGNOSIS — Z86.79 S/P ABLATION OF ATRIAL FIBRILLATION: ICD-10-CM

## 2024-01-22 DIAGNOSIS — Z98.890 S/P ABLATION OF ATRIAL FIBRILLATION: ICD-10-CM

## 2024-01-22 PROCEDURE — 999N000096 CARDIAC MOBILE TELEMETRY MONITOR

## 2024-02-07 PROCEDURE — 93228 REMOTE 30 DAY ECG REV/REPORT: CPT | Performed by: INTERNAL MEDICINE

## 2024-02-08 DIAGNOSIS — E78.2 MIXED HYPERLIPIDEMIA: ICD-10-CM

## 2024-02-08 RX ORDER — ATORVASTATIN CALCIUM 40 MG/1
40 TABLET, FILM COATED ORAL EVERY EVENING
Qty: 90 TABLET | Refills: 2 | Status: SHIPPED | OUTPATIENT
Start: 2024-02-08 | End: 2024-09-12

## 2024-02-08 NOTE — TELEPHONE ENCOUNTER
Patient's wife's call transferred to author     Wife states patient has 5 days left of Atorvastatin  States Cardiology refilled his other cardiac medications, but did not refill the Atorvastatin    Wondering if Dr. Matt can take over refills as she cannot get in touch with Cardiology care team  Wondering if patient is to continue medication    Medication pended to preferred pharmacy     Melecio Yusuf, Clinic RN  .[facility]

## 2024-02-21 NOTE — PROGRESS NOTES
Thank you, Dr. Richard, for asking the [facility] team to see . Lan Manuel to evaluate 3 months post PVI/atrial flutter ablation.    Assessment/Recommendations     Assessment/Plan:    Diagnoses and all orders for this visit:  Persistent atrial fibrillation (H)  Typical atrial flutter (H)  diagnosed 10/10/23, patient presented to his primary care provider for his wellness exam and reported not feeling well. Heart rate noted to be fast in clinic. EKG confirmed Afib with Atrial flutter, rates 's  Sx: fatigue  Ablation of atrial fibrillation using RF x4 PV + R CTI 11.8.23 performed by Dr Richard  Remains on metoprolol tartrate 25 mg BID, ventricular rates well controlled in the 70's today.   MCOT monitoring from 1 /22- 2/4/24 showed normal sinus rhythm with an average heart rate of 79 bpm, no A-fib or tachyarrhythmias, no bradycardia or pauses, PVC burden less than 1%.  Remains on Metoprolol 25 mg BID  He does monitor his heart rate using his Fitbit intermittently, average resting heart rates typically run in the 80s, on a rare occasion he will note heart rates in the 110's but only persist for a minute or 2.  No ER visits since ablation      [id number] score of 1 for HTN and on Eliquis BID.  He will discontinue this today and resume ASA 81 mg daily.     Essential hypertension  -BP 98/60, well controlled.       PLAN:  -Discontinue Eliquis and metoprolol starting today, recent cardiac event monitoring showed no recurrence of atrial fibrillation or flutter post ablation  -Resume aspirin 81 mg daily for stroke prevention  -His wife will look into purchasing a HE[employer]BE mobile toolbar so he can monitor rhythm and heart rate at home, she will download the HEALBE ludmila tonight onto both of their phones  -discussed cutting back on his alcohol intake, he has increased his alcohol intake once again, he is consuming a 12 pack of beer daily, also discussed increasing water intake, he is not taking much  water intake and at this time, aim for goal of at least 60 ounces daily  -Follow up with Dr Carney in August  -Follow up with me again in November, one year post ablation     History of Present Illness/Subjective     Lan Manuel is a very pleasant 63 year old male who comes in today for EP follow up 3 months post PVI/AFL ablation    Lan Manuel has a known history of newly diagnosed atrial fibs/atrial flutter, essential hypertension, alcohol use, nicotine dependence.     Arrhythmia hx  Dx/date: diagnosed 10/10/23, patient presented to his primary care provider for his wellness exam and reported not feeling well. Heart rate noted to be fast in clinic.  Patient denies any chest pain or shortness of breath.  Denies any lightheadedness.  Does not feel as though his heart is beating quickly necessarily. EKG confirmed Afib with Atrial flutter, rates 's  Sx: fatigue  Prior AAD, AV nadia blocking agents: amiodarone, metoprolol tartrate BID  Procedures  DCCV: no  Ablation: Ablation of atrial fibrillation using RF x4 PV + R CTI 11.8. 23 performed by Dr Jose Manuel Lancaster is doing well 3 months post ablation.  Ventricular rates are well-controlled in the 80s, he remains on metoprolol tartrate 25 mg twice daily.  He does monitor her heart rates intermittently using his Fitbit, on average, his heart rates are typically in the 80s, on a rare occasion he will notice them increased for a minute or 2 up to 110 bpm.  He does notice an increase in his energy level since ablation.  He denies any recent chest pain, palpitations, shortness of breath on exertion or at rest, dizziness or lightheadedness or presyncope.  He does continue to consume 12 beers per day since ablation.  Continues to smoke 1-1 and half packs of cigarettes daily along with daily marijuana use.  He remains on Eliquis twice daily for stroke prevention however he does have a low CHADS2 Vascor 1 due to hypertension.  His recent cardiac event monitor  post ablation showed no recurrence of atrial fibrillation or flutter, he was in normal sinus rhythm with an average heart rate of 79 bpm, no bradycardia or pauses were noted on his monitor, PVC burden was less than 1%.     Cardiographics (reviewed):     11/8/23 NSR rate 71 bpm QT/QTC: 420/456 ms  10/13/23 normal sinus rhythm left atrial enlargement rate 66 bpm QRS duration 90 ms QT/QTc 444/465 ms   10/10/23 Atrial fibrillation rate 83 bpm      Mobile cardiac telemetry monitoring from 1/22/2024 to 2/4/2024 (monitored duration 12d 10h 54m).  Predominant underlying rhythm was sinus rhythm, 50 to 120bpm, average 79bpm.  No tachyarrhythmias.  No atrial fibrillation.  There were no pauses of over 3.0s.  No supraventricular ectopic beats reported.  Rare premature ventricular contractions (<1%).  No symptoms recorded    10/12/23 DANA  The left ventricle is normal in size.  The visual ejection fraction is 30-35%.  There is moderate global hypokinesia of the left ventricle.  Mildly decreased right ventricular systolic function  Trabeculations in MINDI but no clear thrombus.           Problem List:  Patient Active Problem List   Diagnosis    Essential hypertension    Multiple rib fractures    Nicotine dependence    Left scapula fracture    Diverticulitis    Diverticulitis of colon    Appendicular diverticulum    Alcohol use    Adenomatous colon polyp    Ganglion    Atrial fibrillation with RVR (H)    New onset atrial fibrillation (H)    Typical atrial flutter (H)    Prediabetes    Persistent atrial fibrillation (H)    S/P ablation of atrial fibrillation     Revi  e  Physical Examination Review of Systems   w stems  There were no vitals taken for this visit.  There is no height or weight on file to calculate BMI.  Wt Readings from Last 3 Encounters:   12/21/23 83.5 kg (184 lb)   11/08/23 79.5 kg (175 lb 4.8 oz)   10/23/23 80.3 kg (177 lb)     General Appearance:   Alert, well-appearing and in no acute distress.   HEENT:  Atraumatic, normocephalic.  No scleral icterus, normal conjunctivae; mucous membranes pink and moist.     Chest: Chest symmetric, spine straight.   Lungs:   Respirations unlabored: Lungs are clear to auscultation.   Cardiovascular:   Normal first and second heart sounds with no murmurs, rubs, or gallops.  Regular, regular.   Normal JVD, no edema.       Extremities: No cyanosis or clubbing   Musculoskeletal: Moves all extremities   Skin: Warm, dry, intact.    Neurologic: Mood and affect are appropriate, alert and oriented to person, place, time, and situation     ROS: 10 point ROS neg other than the symptoms noted above in the HPI.     Medical History  Surgical History Family History Social History     Past Medical History:   Diagnosis Date    Atrial fibrillation with RVR (H) 10/10/2023    Hypertension 2015    Past Surgical History:   Procedure Laterality Date    COLONOSCOPY N/A 3/2/2021    Procedure: COLONOSCOPY, WITH POLYPECTOMY AND BIOPSY;  Surgeon: Francis Chow MD;  [facility]    EP ABLATION PULMONARY VEIN ISOLATION N/A 2023    Procedure: Ablation Atrial Fibrillation;  Surgeon: Tu Richard MD;  Location: [facility] CATH LAB CV    IR LUMBAR EPIDURAL STEROID INJECTION  2007    Family History   Problem Relation Age of Onset    Myocardial Infarction Mother 37         of MI at 37    No Known Problems Father     Heart Disease Mother 37.00    Allergies Father     History   Smoking Status    Every Day    Packs/day: 1.00    Types: Cigarettes   Smokeless Tobacco    Never     Social History    Substance and Sexual Activity      Alcohol use: Yes        Comment: 6/day       Medications  Allergies     Current Outpatient Medications   Medication Sig Dispense Refill    apixaban ANTICOAGULANT (ELIQUIS) 5 MG tablet Take 1 tablet (5 mg) by mouth 2 times daily 180 tablet 3    aspirin 81 MG EC tablet Take 1 tablet (81 mg) by mouth daily      atorvastatin (LIPITOR) 40 MG tablet Take 1 tablet (40 mg) by mouth  "every evening 90 tablet 2    lisinopril (ZESTRIL) 10 MG tablet TAKE 1 TABLET (10 MG) BY MOUTH DAILY 90 tablet 3    metoprolol tartrate (LOPRESSOR) 25 MG tablet Take 1 tablet (25 mg) by mouth 2 times daily 180 tablet 3      No Known Allergies   Medical, surgical, family, social history, and medications were all reviewed and updated as necessary.   Lab Results    Chemistry/lipid CBC Cardiac Enzymes/BNP/TSH/INR   Recent Labs   Lab Test 10/13/23  0000   CHOL 147   HDL 60   LDL 65   TRIG 108     Recent Labs   Lab Test 10/13/23  0000 02/06/19  1518 05/22/17  1550   LDL 65 98 93     Recent Labs   Lab Test 11/08/23  1405 11/08/23  0557   NA  --  140   POTASSIUM 4.3 4.2   CHLORIDE  --  107   CO2  --  23   GLC  --  121*   BUN  --  18.8   CR  --  0.84   GFRESTIMATED  --  >90   EROS  --  9.7     Recent Labs   Lab Test 11/08/23  0557 10/14/23  0422 10/13/23  0622   CR 0.84 0.83 0.87     Recent Labs   Lab Test 10/13/23  0000 02/06/19  1518 05/22/17  1550   A1C 6.2* 5.5 5.7          Recent Labs   Lab Test 11/08/23  0557   WBC 5.9   HGB 15.1   HCT 44.0   MCV 96        Recent Labs   Lab Test 11/08/23  0557 10/14/23  0422 10/13/23  0622   HGB 15.1 15.6 15.1    No results for input(s): \"TROPONINI\" in the last 81673 hours.  Recent Labs   Lab Test 10/11/23  0543   NTBNPI 1,449*     Recent Labs   Lab Test 10/10/23  1620   TSH 2.49     Recent Labs   Lab Test 10/10/23  1620   INR 1.02          Total Time- 31 minutes spent on date of encounter doing chart review, history and exam, documentation and further activities as noted above.  This note has been dictated using voice recognition software. Any grammatical, typographical, or context distortions are unintentional and inherent to the software.    Lilo Wills [facility]  [phone]                       "

## 2024-02-22 ENCOUNTER — OFFICE VISIT (OUTPATIENT)
Dept: CARDIOLOGY | Facility: CLINIC | Age: 64
End: 2024-02-22
Attending: NURSE PRACTITIONER
Payer: COMMERCIAL

## 2024-02-22 VITALS
HEIGHT: 67 IN | DIASTOLIC BLOOD PRESSURE: 60 MMHG | BODY MASS INDEX: 29.51 KG/M2 | SYSTOLIC BLOOD PRESSURE: 98 MMHG | WEIGHT: 188 LBS | RESPIRATION RATE: 16 BRPM | HEART RATE: 80 BPM

## 2024-02-22 DIAGNOSIS — I48.3 TYPICAL ATRIAL FLUTTER (H): ICD-10-CM

## 2024-02-22 DIAGNOSIS — I48.19 PERSISTENT ATRIAL FIBRILLATION (H): Primary | ICD-10-CM

## 2024-02-22 DIAGNOSIS — I10 ESSENTIAL HYPERTENSION: ICD-10-CM

## 2024-02-22 DIAGNOSIS — Z86.79 S/P ABLATION OF ATRIAL FIBRILLATION: ICD-10-CM

## 2024-02-22 DIAGNOSIS — Z98.890 S/P ABLATION OF ATRIAL FIBRILLATION: ICD-10-CM

## 2024-02-22 PROCEDURE — 99214 OFFICE O/P EST MOD 30 MIN: CPT | Performed by: NURSE PRACTITIONER

## 2024-02-22 NOTE — LETTER
2/22/2024    Jenniffer Matt [place]  [address] [facility] [address]    RE: Lan Manuel       Dear Colleague,     I had the pleasure of seeing Lan Manuel in the [facility].    Thank you, Dr. Richard, for asking the [facility] team to see Mr. Lan Manuel to evaluate 3 months post PVI/atrial flutter ablation.    Assessment/Recommendations     Assessment/Plan:    Diagnoses and all orders for this visit:  Persistent atrial fibrillation (H)  Typical atrial flutter (H)  diagnosed 10/10/23, patient presented to his primary care provider for his wellness exam and reported not feeling well. Heart rate noted to be fast in clinic. EKG confirmed Afib with Atrial flutter, rates 's  Sx: fatigue  Ablation of atrial fibrillation using RF x4 PV + R CTI 11.8.23 performed by Dr Richard  Remains on metoprolol tartrate 25 mg BID, ventricular rates well controlled in the 70's today.   MCOT monitoring from 1 /22- 2/4/24 showed normal sinus rhythm with an average heart rate of 79 bpm, no A-fib or tachyarrhythmias, no bradycardia or pauses, PVC burden less than 1%.  Remains on Metoprolol 25 mg BID  He does monitor his heart rate using his Fitbit intermittently, average resting heart rates typically run in the 80s, on a rare occasion he will note heart rates in the 110's but only persist for a minute or 2.  No ER visits since ablation      [id number] score of 1 for HTN and on Eliquis BID.  He will discontinue this today and resume ASA 81 mg daily.     Essential hypertension  -BP 98/60, well controlled.       PLAN:  -Discontinue Eliquis and metoprolol starting today, recent cardiac event monitoring showed no recurrence of atrial fibrillation or flutter post ablation  -Resume aspirin 81 mg daily for stroke prevention  -His wife will look into purchasing a iiko mobile toolbar so he can monitor rhythm and heart rate at home, she will download the iiko ludmila tonight onto  both of their phones  -discussed cutting back on his alcohol intake, he has increased his alcohol intake once again, he is consuming a 12 pack of beer daily, also discussed increasing water intake, he is not taking much water intake and at this time, aim for goal of at least 60 ounces daily  -Follow up with Dr Carney in August  -Follow up with me again in November, one year post ablation     History of Present Illness/Subjective     Lan Manuel is a very pleasant 63 year old male who comes in today for EP follow up 3 months post PVI/AFL ablation    Lan Manuel has a known history of newly diagnosed atrial fibs/atrial flutter, essential hypertension, alcohol use, nicotine dependence.     Arrhythmia hx  Dx/date: diagnosed 10/10/23, patient presented to his primary care provider for his wellness exam and reported not feeling well. Heart rate noted to be fast in clinic.  Patient denies any chest pain or shortness of breath.  Denies any lightheadedness.  Does not feel as though his heart is beating quickly necessarily. EKG confirmed Afib with Atrial flutter, rates 's  Sx: fatigue  Prior AAD, AV nadia blocking agents: amiodarone, metoprolol tartrate BID  Procedures  DCCV: no  Ablation: Ablation of atrial fibrillation using RF x4 PV + R CTI 11.8. 23 performed by Dr Jose Manuel Lancaster is doing well 3 months post ablation.  Ventricular rates are well-controlled in the 80s, he remains on metoprolol tartrate 25 mg twice daily.  He does monitor her heart rates intermittently using his Fitbit, on average, his heart rates are typically in the 80s, on a rare occasion he will notice them increased for a minute or 2 up to 110 bpm.  He does notice an increase in his energy level since ablation.  He denies any recent chest pain, palpitations, shortness of breath on exertion or at rest, dizziness or lightheadedness or presyncope.  He does continue to consume 12 beers per day since ablation.  Continues to smoke 1-1 and  half packs of cigarettes daily along with daily marijuana use.  He remains on Eliquis twice daily for stroke prevention however he does have a low CHADS2 Vascor 1 due to hypertension.  His recent cardiac event monitor post ablation showed no recurrence of atrial fibrillation or flutter, he was in normal sinus rhythm with an average heart rate of 79 bpm, no bradycardia or pauses were noted on his monitor, PVC burden was less than 1%.     Cardiographics (reviewed):     11/8/23 NSR rate 71 bpm QT/QTC: 420/456 ms  10/13/23 normal sinus rhythm left atrial enlargement rate 66 bpm QRS duration 90 ms QT/QTc 444/465 ms   10/10/23 Atrial fibrillation rate 83 bpm      Mobile cardiac telemetry monitoring from 1/22/2024 to 2/4/2024 (monitored duration 12d 10h 54m).  Predominant underlying rhythm was sinus rhythm, 50 to 120bpm, average 79bpm.  No tachyarrhythmias.  No atrial fibrillation.  There were no pauses of over 3.0s.  No supraventricular ectopic beats reported.  Rare premature ventricular contractions (<1%).  No symptoms recorded    10/12/23 DANA  The left ventricle is normal in size.  The visual ejection fraction is 30-35%.  There is moderate global hypokinesia of the left ventricle.  Mildly decreased right ventricular systolic function  Trabeculations in MINDI but no clear thrombus.           Problem List:  Patient Active Problem List   Diagnosis    Essential hypertension    Multiple rib fractures    Nicotine dependence    Left scapula fracture    Diverticulitis    Diverticulitis of colon    Appendicular diverticulum    Alcohol use    Adenomatous colon polyp    Ganglion    Atrial fibrillation with RVR (H)    New onset atrial fibrillation (H)    Typical atrial flutter (H)    Prediabetes    Persistent atrial fibrillation (H)    S/P ablation of atrial fibrillation     Revi  e  Physical Examination Review of Systems   w [facility]  There were no vitals taken for this visit.  There is no height or weight on file to calculate  BMI.  Wt Readings from Last 3 Encounters:   23 83.5 kg (184 lb)   23 79.5 kg (175 lb 4.8 oz)   10/23/23 80.3 kg (177 lb)     General Appearance:   Alert, well-appearing and in no acute distress.   HEENT: Atraumatic, normocephalic.  No scleral icterus, normal conjunctivae; mucous membranes pink and moist.     Chest: Chest symmetric, spine straight.   Lungs:   Respirations unlabored: Lungs are clear to auscultation.   Cardiovascular:   Normal first and second heart sounds with no murmurs, rubs, or gallops.  Regular, regular.   Normal JVD, no edema.       Extremities: No cyanosis or clubbing   Musculoskeletal: Moves all extremities   Skin: Warm, dry, intact.    Neurologic: Mood and affect are appropriate, alert and oriented to person, place, time, and situation     ROS: 10 point ROS neg other than the symptoms noted above in the HPI.     Medical History  Surgical History Family History Social History     Past Medical History:   Diagnosis Date    Atrial fibrillation with RVR (H) 10/10/2023    Hypertension 2015    Past Surgical History:   Procedure Laterality Date    COLONOSCOPY N/A 3/2/2021    Procedure: COLONOSCOPY, WITH POLYPECTOMY AND BIOPSY;  Surgeon: Francis Chow MD;  Location: [facility]    EP ABLATION PULMONARY VEIN ISOLATION N/A 2023    Procedure: Ablation Atrial Fibrillation;  Surgeon: Tu Richard MD;  Location: [facility] CATH LAB CV    IR LUMBAR EPIDURAL STEROID INJECTION  2007    Family History   Problem Relation Age of Onset    Myocardial Infarction Mother 37         of MI at 37    No Known Problems Father     Heart Disease Mother 37.00    Allergies Father     History   Smoking Status    Every Day    Packs/day: 1.00    Types: Cigarettes   Smokeless Tobacco    Never     Social History    Substance and Sexual Activity      Alcohol use: Yes        Comment: 6/day       Medications  Allergies     Current Outpatient Medications   Medication Sig Dispense Refill    apixaban  "ANTICOAGULANT (ELIQUIS) 5 MG tablet Take 1 tablet (5 mg) by mouth 2 times daily 180 tablet 3    aspirin 81 MG EC tablet Take 1 tablet (81 mg) by mouth daily      atorvastatin (LIPITOR) 40 MG tablet Take 1 tablet (40 mg) by mouth every evening 90 tablet 2    lisinopril (ZESTRIL) 10 MG tablet TAKE 1 TABLET (10 MG) BY MOUTH DAILY 90 tablet 3    metoprolol tartrate (LOPRESSOR) 25 MG tablet Take 1 tablet (25 mg) by mouth 2 times daily 180 tablet 3      No Known Allergies   Medical, surgical, family, social history, and medications were all reviewed and updated as necessary.   Lab Results    Chemistry/lipid CBC Cardiac Enzymes/BNP/TSH/INR   Recent Labs   Lab Test 10/13/23  0000   CHOL 147   HDL 60   LDL 65   TRIG 108     Recent Labs   Lab Test 10/13/23  0000 02/06/19  1518 05/22/17  1550   LDL 65 98 93     Recent Labs   Lab Test 11/08/23  1405 11/08/23  0557   NA  --  140   POTASSIUM 4.3 4.2   CHLORIDE  --  107   CO2  --  23   GLC  --  121*   BUN  --  18.8   CR  --  0.84   GFRESTIMATED  --  >90   EROS  --  9.7     Recent Labs   Lab Test 11/08/23  0557 10/14/23  0422 10/13/23  0622   CR 0.84 0.83 0.87     Recent Labs   Lab Test 10/13/23  0000 02/06/19  1518 05/22/17  1550   A1C 6.2* 5.5 5.7          Recent Labs   Lab Test 11/08/23  0557   WBC 5.9   HGB 15.1   HCT 44.0   MCV 96        Recent Labs   Lab Test 11/08/23  0557 10/14/23  0422 10/13/23  0622   HGB 15.1 15.6 15.1    No results for input(s): \"TROPONINI\" in the last 26607 hours.  Recent Labs   Lab Test 10/11/23  0543   NTBNPI 1,449*     Recent Labs   Lab Test 10/10/23  1620   TSH 2.49     Recent Labs   Lab Test 10/10/23  1620   INR 1.02          Total Time- 31 minutes spent on date of encounter doing chart review, history and exam, documentation and further activities as noted above.  This note has been dictated using voice recognition software. Any grammatical, typographical, or context distortions are unintentional and inherent to the software.    Lilo" Elma CHAN [facility]  [phone]    Thank you for allowing me to participate in the care of your patient.      Sincerely,     DENISE Bo [facility]  cc:   Lilo Wills NP  [address], [address]  [place] [address]

## 2024-02-22 NOTE — PATIENT INSTRUCTIONS
Lan Manuel,    It was a pleasure to see you today at the [facility].     My recommendations after this visit include:    Stop Eliquis and metoprolol today  Continue with all other medications  Follow up with Dr Carney in August, call in May to schedule this appointment.   Follow up in November for your one year post ablation follow up. Call in August to schedule this appointment.   Look into [employer] toolbar and download the ludmila for rhythm and heart rate monitoring going forward  If you run into any issues with Afib recurrence that lasts longer than 4 hours, call the EP RN office, see number down below.     Lilo Wills CNP  [facility], Electrophysiology  [phone]  EP nurses [phone]

## 2024-04-30 ENCOUNTER — PATIENT OUTREACH (OUTPATIENT)
Dept: GASTROENTEROLOGY | Facility: CLINIC | Age: 64
End: 2024-04-30
Payer: COMMERCIAL

## 2024-04-30 DIAGNOSIS — Z12.11 SPECIAL SCREENING FOR MALIGNANT NEOPLASMS, COLON: Primary | ICD-10-CM

## 2024-04-30 NOTE — PROGRESS NOTES
"CRC Screening Colonoscopy Referral Review    Patient meets the inclusion criteria for screening colonoscopy standing order.    Ordering/Referring Provider:  Dr. JESSIE Matt    BMI: Estimated body mass index is 29.17 kg/m  as calculated from the following:    Height as of 2/22/24: 1.71 m (5' 7.32\").    Weight as of 2/22/24: 85.3 kg (188 lb).     Sedation:  Does patient have any of the following conditions affecting sedation?  Hx of chemical dependency: MAC sedation recommended    Previous Scopes:  Any previous recommendations or follow up needs based on previous scope?  na / No recommendations.    Medical Concerns to Postpone Order:  Does patient have any of the following medical concerns that should postpone/delay colonoscopy referral?  No medical conditions affecting colonoscopy referral.    Final Referral Details:  Based on patient's medical history patient is appropriate for referral order with MAC/deep sedation.   BMI<= 45 45 < BMI <= 48 48 < BMI < = 50  BMI > 50   No Restrictions No [facility]  No [facility]  [facility] with exceptions Hospital Only OR Only     "

## 2024-06-20 ENCOUNTER — TELEPHONE (OUTPATIENT)
Dept: CARDIOLOGY | Facility: CLINIC | Age: 64
End: 2024-06-20
Payer: COMMERCIAL

## 2024-06-20 NOTE — TELEPHONE ENCOUNTER
Phone call to patients wife, reviewed consent to communicate.  She states pt was recommended to follow-up with a cardiologist when he was seen in February, she cannot remember who that was or when.  Chart reviewed, pt was recommended to follow-up with Dr. Carney in August.  Will ask scheduling to contact pt's wife to schedule, her number is the mobile number.

## 2024-06-20 NOTE — TELEPHONE ENCOUNTER
Health Call Center    Phone Message    May a detailed message be left on voicemail: yes     Reason for Call: Other: Spouse called requesting to speak with a member of the patients care team in regards to next steps. Spouse states after his ablation she was given paperwork with a number and instructions on what to do, but has since lost it. Please call back to further discuss.     Action Taken: Message routed to:  Other: Cardiology    Travel Screening: Not Applicable     Thank you!  [facility]

## 2024-08-16 DIAGNOSIS — I10 PRIMARY HYPERTENSION: ICD-10-CM

## 2024-08-16 RX ORDER — LISINOPRIL 10 MG/1
TABLET ORAL
Qty: 90 TABLET | Refills: 3 | OUTPATIENT
Start: 2024-08-16

## 2024-08-19 ENCOUNTER — TELEPHONE (OUTPATIENT)
Dept: FAMILY MEDICINE | Facility: CLINIC | Age: 64
End: 2024-08-19
Payer: COMMERCIAL

## 2024-08-19 NOTE — TELEPHONE ENCOUNTER
Medication Question or Refill        What medication are you calling about (include dose and sig)?: Lisinopril    Preferred Pharmacy:   [id number] [place] - [address]  [address] [address]  Phone: [phone] Fax: [phone]      Controlled Substance Agreement on file:   CSA -- Patient Level:    CSA: None found at the patient level.       Who prescribed the medication?: Vernell    Do you need a refill? Yes    When did you use the medication last? Today    Patient offered an appointment? No    Do you have any questions or concerns?  No      Could we send this information to you in [employer] or would you prefer to receive a phone call?:   Patient would prefer a phone call   Okay to leave a detailed message?: Yes at Cell number on file:    Telephone Information:   Mobile [phone]

## 2024-08-20 NOTE — TELEPHONE ENCOUNTER
Call placed to Patient.  No answer.  Left message with call back number for Patient to return call.  Keyon Ackerman RN

## 2024-08-20 NOTE — TELEPHONE ENCOUNTER
If patient calls back. Per dispense record, he filled this on 7/16/2024 with a 90 day fill. It is too soon for him to refill.    He would not be due for refills until October which is when he will be due for a annual visit.      Jennifer Conrad RN on 8/20/2024 at 11:03 AM

## 2024-09-10 ENCOUNTER — PATIENT OUTREACH (OUTPATIENT)
Dept: CARE COORDINATION | Facility: CLINIC | Age: 64
End: 2024-09-10
Payer: COMMERCIAL

## 2024-09-12 ENCOUNTER — OFFICE VISIT (OUTPATIENT)
Dept: FAMILY MEDICINE | Facility: CLINIC | Age: 64
End: 2024-09-12
Payer: COMMERCIAL

## 2024-09-12 VITALS
HEART RATE: 83 BPM | RESPIRATION RATE: 14 BRPM | SYSTOLIC BLOOD PRESSURE: 123 MMHG | DIASTOLIC BLOOD PRESSURE: 91 MMHG | TEMPERATURE: 98.2 F | HEIGHT: 68 IN | OXYGEN SATURATION: 95 % | BODY MASS INDEX: 28.61 KG/M2 | WEIGHT: 188.8 LBS

## 2024-09-12 DIAGNOSIS — R73.03 PREDIABETES: ICD-10-CM

## 2024-09-12 DIAGNOSIS — I10 PRIMARY HYPERTENSION: ICD-10-CM

## 2024-09-12 DIAGNOSIS — E78.2 MIXED HYPERLIPIDEMIA: Primary | ICD-10-CM

## 2024-09-12 DIAGNOSIS — Z23 IMMUNIZATION DUE: ICD-10-CM

## 2024-09-12 DIAGNOSIS — Z79.899 MEDICATION MANAGEMENT: ICD-10-CM

## 2024-09-12 LAB
ERYTHROCYTE [DISTWIDTH] IN BLOOD BY AUTOMATED COUNT: 12 % (ref 10–15)
HCT VFR BLD AUTO: 45.7 % (ref 40–53)
HGB BLD-MCNC: 15.3 G/DL (ref 13.3–17.7)
MCH RBC QN AUTO: 32.5 PG (ref 26.5–33)
MCHC RBC AUTO-ENTMCNC: 33.5 G/DL (ref 31.5–36.5)
MCV RBC AUTO: 97 FL (ref 78–100)
PLATELET # BLD AUTO: 200 10E3/UL (ref 150–450)
RBC # BLD AUTO: 4.71 10E6/UL (ref 4.4–5.9)
WBC # BLD AUTO: 5.8 10E3/UL (ref 4–11)

## 2024-09-12 PROCEDURE — 90472 IMMUNIZATION ADMIN EACH ADD: CPT | Performed by: FAMILY MEDICINE

## 2024-09-12 PROCEDURE — 80053 COMPREHEN METABOLIC PANEL: CPT | Performed by: FAMILY MEDICINE

## 2024-09-12 PROCEDURE — 90673 RIV3 VACCINE NO PRESERV IM: CPT | Performed by: FAMILY MEDICINE

## 2024-09-12 PROCEDURE — 36415 COLL VENOUS BLD VENIPUNCTURE: CPT | Performed by: FAMILY MEDICINE

## 2024-09-12 PROCEDURE — 90715 TDAP VACCINE 7 YRS/> IM: CPT | Performed by: FAMILY MEDICINE

## 2024-09-12 PROCEDURE — 80061 LIPID PANEL: CPT | Performed by: FAMILY MEDICINE

## 2024-09-12 PROCEDURE — 90471 IMMUNIZATION ADMIN: CPT | Performed by: FAMILY MEDICINE

## 2024-09-12 PROCEDURE — 85027 COMPLETE CBC AUTOMATED: CPT | Performed by: FAMILY MEDICINE

## 2024-09-12 PROCEDURE — 99214 OFFICE O/P EST MOD 30 MIN: CPT | Mod: 25 | Performed by: FAMILY MEDICINE

## 2024-09-12 PROCEDURE — 83036 HEMOGLOBIN GLYCOSYLATED A1C: CPT | Performed by: FAMILY MEDICINE

## 2024-09-12 RX ORDER — ATORVASTATIN CALCIUM 40 MG/1
40 TABLET, FILM COATED ORAL EVERY EVENING
Qty: 90 TABLET | Refills: 3 | Status: SHIPPED | OUTPATIENT
Start: 2024-09-12

## 2024-09-12 RX ORDER — LISINOPRIL 20 MG/1
20 TABLET ORAL DAILY
Qty: 90 TABLET | Refills: 3 | Status: SHIPPED | OUTPATIENT
Start: 2024-09-12

## 2024-09-12 NOTE — PROGRESS NOTES
Assessment & Plan     1. Mixed hyperlipidemia  - Comprehensive metabolic panel (BMP + Alb, Alk Phos, ALT, AST, Total. Bili, TP); Future  - Lipid panel reflex to direct LDL Fasting; Future  - atorvastatin (LIPITOR) 40 MG tablet; Take 1 tablet (40 mg) by mouth every evening.  Dispense: 90 tablet; Refill: 3    Continues atorvastatin for primary prevention.  Medication monitoring labs today.  Encourage smoking cessation and good blood pressure control.    He also continues on baby aspirin per guidance of cardiology for stroke prophylaxis with history of atrial fibrillation status post successful ablation.    2. Primary hypertension  - Comprehensive metabolic panel (BMP + Alb, Alk Phos, ALT, AST, Total. Bili, TP); Future  - lisinopril (ZESTRIL) 20 MG tablet; Take 1 tablet (20 mg) by mouth daily. TAKE 1 TABLET (10 MG) BY MOUTH DAILY  Dispense: 90 tablet; Refill: 3    Blood pressure borderline elevated today, I suspect this has increased after discontinuing metoprolol in February.  Recommend checking at home, we will increase dose of lisinopril from 10 mg daily to 20 mg daily.  Medication monitoring labs.    I will send [employer] message to patient in 2 weeks to check in on status of home readings since adjusting dose.    3. Medication management  - CBC with platelets; Future  - Comprehensive metabolic panel (BMP + Alb, Alk Phos, ALT, AST, Total. Bili, TP); Future  - Lipid panel reflex to direct LDL Fasting; Future      Medication management per medications above, also checking CBC for occult anemia given chronic baby aspirin use.    4. Immunization due  - TDAP 10-64Y (ADACEL,BOOSTRIX)  - INFLUENZA VACCINE TRIVALENT(FLUBLOK)     The longitudinal plan of care for the diagnosis(es)/condition(s) as documented were addressed during this visit. Due to the added complexity in care, I will continue to support Tonny in the subsequent management and with ongoing continuity of care.     Subjective   Tonny is a 63 year old, presenting  "for the following health issues:  Recheck Medication        9/12/2024     9:46 AM   Additional Questions   Roomed by Gena     History of Present Illness       Reason for visit:  To get my pills He is missing 3 dose(s) of medications per week.       Mixed hyperlipidemia    The 10-year ASCVD risk score (Yesica ODEN, et al., 2019) is: 12.3%    Values used to calculate the score:      Age: 63 years      Sex: Male      Is Non- : No      Diabetic: No      Tobacco smoker: Yes      Systolic Blood Pressure: 134 mmHg      Is BP treated: Yes      HDL Cholesterol: 60 mg/dL      Total Cholesterol: 147 mg/dL     Lab Results   Component Value Date    CHOL 147 10/13/2023     Lab Results   Component Value Date    HDL 60 10/13/2023     Lab Results   Component Value Date    LDL 65 10/13/2023     Lab Results   Component Value Date    TRIG 108 10/13/2023     Atorvastatin 40 mg daily, baby aspirin.      Primary hypertension  BP Readings from Last 6 Encounters:   09/12/24 (!) 134/90   02/22/24 98/60   12/21/23 104/58   11/08/23 121/78   10/23/23 110/78   10/19/23 92/60     Last Comprehensive Metabolic Panel:  Lab Results   Component Value Date     11/08/2023    POTASSIUM 4.3 11/08/2023    CHLORIDE 107 11/08/2023    CO2 23 11/08/2023    ANIONGAP 10 11/08/2023     (H) 11/08/2023    BUN 18.8 11/08/2023    CR 0.84 11/08/2023    GFRESTIMATED >90 11/08/2023    EROS 9.7 11/08/2023     Lisinopril 10mg daily, off metoprolol after cardiology appointment in February.        Health maintenance  - flu: will do today    - PCV: defer    - TDaP: will do today    - colon: declines today        Review of Systems  See HPI above.         Objective    BP (!) 134/90   Pulse 83   Temp 98.2  F (36.8  C) (Oral)   Resp 14   Ht 1.727 m (5' 8\")   Wt 85.6 kg (188 lb 12.8 oz)   SpO2 95%   BMI 28.71 kg/m    Body mass index is 28.71 kg/m .  Physical Exam   GENERAL: alert and no distress  NECK: no adenopathy, no asymmetry, " masses, or scars  RESP: lungs clear to auscultation - no rales, rhonchi or wheezes  CV: regular rate and rhythm, normal S1 S2, no S3 or S4, no murmur, click or rub, no peripheral edema  ABDOMEN: soft, nontender, no hepatosplenomegaly, no masses and bowel sounds normal  MS: no gross musculoskeletal defects noted, no edema            Signed Electronically by: Lesia Green, DO

## 2024-09-13 LAB
ALBUMIN SERPL BCG-MCNC: 4.7 G/DL (ref 3.5–5.2)
ALP SERPL-CCNC: 83 U/L (ref 40–150)
ALT SERPL W P-5'-P-CCNC: 25 U/L (ref 0–70)
ANION GAP SERPL CALCULATED.3IONS-SCNC: 10 MMOL/L (ref 7–15)
AST SERPL W P-5'-P-CCNC: 26 U/L (ref 0–45)
BILIRUB SERPL-MCNC: 1 MG/DL
BUN SERPL-MCNC: 15.9 MG/DL (ref 8–23)
CALCIUM SERPL-MCNC: 9.7 MG/DL (ref 8.8–10.4)
CHLORIDE SERPL-SCNC: 101 MMOL/L (ref 98–107)
CHOLEST SERPL-MCNC: 148 MG/DL
CREAT SERPL-MCNC: 0.92 MG/DL (ref 0.67–1.17)
EGFRCR SERPLBLD CKD-EPI 2021: >90 ML/MIN/1.73M2
FASTING STATUS PATIENT QL REPORTED: YES
FASTING STATUS PATIENT QL REPORTED: YES
GLUCOSE SERPL-MCNC: 118 MG/DL (ref 70–99)
HBA1C MFR BLD: 5.5 % (ref 0–5.6)
HCO3 SERPL-SCNC: 26 MMOL/L (ref 22–29)
HDLC SERPL-MCNC: 65 MG/DL
LDLC SERPL CALC-MCNC: 70 MG/DL
NONHDLC SERPL-MCNC: 83 MG/DL
POTASSIUM SERPL-SCNC: 4.8 MMOL/L (ref 3.4–5.3)
PROT SERPL-MCNC: 7.5 G/DL (ref 6.4–8.3)
SODIUM SERPL-SCNC: 137 MMOL/L (ref 135–145)
TRIGL SERPL-MCNC: 63 MG/DL

## 2024-09-13 NOTE — RESULT ENCOUNTER NOTE
The 10-year ASCVD risk score (Yesica ODEN, et al., 2019) is: 10.2%  Patient updated by [facility] message with lab results.       Mayra Lancaster,  Lab results have returned and overall look good.  Please continue current treatment regimen.  Reach out by [facility] with any follow-up questions or concerns.  Lesia Green, DO

## 2024-09-13 NOTE — PROGRESS NOTES
Prediabetes  - Hemoglobin A1c     Hemoglobin A1C   Date Value Ref Range Status   10/13/2023 6.2 (H) <5.7 % Final     Comment:     Normal <5.7%   Prediabetes 5.7-6.4%    Diabetes 6.5% or higher     Note: Adopted from ADA consensus guidelines.   02/06/2019 5.5 3.5 - 6.0 % Final   05/22/2017 5.7 3.5 - 6.0 % Final       Glucose mildly elevated, on chart review last A1c in prediabetes range, due for surveillance A1c. Adding on to recent blood work.     Lesia Green, DO

## 2024-09-24 ENCOUNTER — PATIENT OUTREACH (OUTPATIENT)
Dept: CARE COORDINATION | Facility: CLINIC | Age: 64
End: 2024-09-24
Payer: COMMERCIAL

## 2024-11-05 ENCOUNTER — OFFICE VISIT (OUTPATIENT)
Dept: FAMILY MEDICINE | Facility: CLINIC | Age: 64
End: 2024-11-05
Payer: COMMERCIAL

## 2024-11-05 VITALS
DIASTOLIC BLOOD PRESSURE: 84 MMHG | OXYGEN SATURATION: 95 % | TEMPERATURE: 98.6 F | BODY MASS INDEX: 27.98 KG/M2 | SYSTOLIC BLOOD PRESSURE: 128 MMHG | WEIGHT: 184 LBS | RESPIRATION RATE: 20 BRPM | HEART RATE: 95 BPM

## 2024-11-05 DIAGNOSIS — I10 PRIMARY HYPERTENSION: Primary | ICD-10-CM

## 2024-11-05 PROCEDURE — 99213 OFFICE O/P EST LOW 20 MIN: CPT | Performed by: FAMILY MEDICINE

## 2024-11-05 NOTE — PROGRESS NOTES
"  Assessment & Plan     Primary hypertension  Patient's blood pressure upper limits of normal  Patient is cigarette prior to walking into clinic  Discussed with his slightly elevated blood pressure  Would recommend continue with 20 mg of lisinopril            BMI  Estimated body mass index is 27.98 kg/m  as calculated from the following:    Height as of 9/12/24: 1.727 m (5' 8\").    Weight as of this encounter: 83.5 kg (184 lb).             Bertha Lancaster is a 64 year old, presenting for the following health issues:  Hypertension (Medication check, change in dosage)        11/5/2024    12:04 PM   Additional Questions   Roomed by Miranda WESTON CMA     History of Present Illness       Hypertension: He presents for follow up of hypertension.  He does not check blood pressure  regularly outside of the clinic. Outpatient blood pressures have not been over 140/90. He does not follow a low salt diet.     He eats 0-1 servings of fruits and vegetables daily.He consumes 3 sweetened beverage(s) daily.He exercises with enough effort to increase his heart rate 30 to 60 minutes per day.  He exercises with enough effort to increase his heart rate 6 days per week.   He is taking medications regularly.     Patient here for hypertension follow-up.  Patient states that a couple months ago his blood pressure medications were changed due to elevated blood pressure.  He is here for follow-up to make sure the dosing is correct.  He is having no problems with the medication.  Blood pressure is upper limits of normal after he is been sitting for a little bit we will have him continue with current dosing.  Follow-up with his regular scheduled PCP.  Patient has refills available.                Objective    /89 (BP Location: Left arm, Patient Position: Sitting, Cuff Size: Adult Large)   Pulse 95   Temp 98.6  F (37  C) (Oral)   Resp 20   Wt 83.5 kg (184 lb)   SpO2 95%   BMI 27.98 kg/m    Body mass index is 27.98 kg/m .  Physical Exam "   GENERAL: alert and no distress  RESP: lungs clear to auscultation - no rales, rhonchi or wheezes  CV: regular rate and rhythm, normal S1 S2, no S3 or S4, no murmur, click or rub, no peripheral edema  MS: no gross musculoskeletal defects noted, no edema  PSYCH: mentation appears normal, affect normal/bright            Signed Electronically by: Nicolas Kelley MD

## 2024-12-01 ENCOUNTER — HEALTH MAINTENANCE LETTER (OUTPATIENT)
Age: 64
End: 2024-12-01

## 2025-03-15 NOTE — TELEPHONE ENCOUNTER
Call placed to patient's wife  CTC on file    Relayed message below  Wife verbalized understanding  No further questions/concerns    Melecio Yusuf, Clinic RN  [facility]        General: Appears well and nontoxic  Mentation: A&O x 3  psych: mood appropriate  HEENT: airway patent, conjunctivae clear bilaterally, 2 cm laceration on the upper buccal mucosa  Resp: symmetric chest rise, no resp distress, breath sounds CTA bilaterally  Cardio: RRR, no m/r/g  GI: soft/nondistended/nontender  : no CVA tenderness  Neuro: sensation and motor function grossly intact, 5/5 strength bilateral upper and lower extremities,  Skin: no cyanosis, no jaundice, 1 cm superficial laceration over the right eyebrow  MSK: normal movement of all extremities, no C-spine, T-spine, L-spine midline tenderness, pelvis intact  Lymph/Vasc: no PEGGY edema

## 2025-04-08 ENCOUNTER — PATIENT OUTREACH (OUTPATIENT)
Dept: CARE COORDINATION | Facility: CLINIC | Age: 65
End: 2025-04-08
Payer: COMMERCIAL

## 2025-04-29 ENCOUNTER — PATIENT OUTREACH (OUTPATIENT)
Dept: GASTROENTEROLOGY | Facility: CLINIC | Age: 65
End: 2025-04-29
Payer: COMMERCIAL

## 2025-04-29 DIAGNOSIS — Z12.11 SPECIAL SCREENING FOR MALIGNANT NEOPLASMS, COLON: Primary | ICD-10-CM

## 2025-05-06 ENCOUNTER — TELEPHONE (OUTPATIENT)
Dept: GASTROENTEROLOGY | Facility: CLINIC | Age: 65
End: 2025-05-06
Payer: COMMERCIAL

## 2025-05-06 NOTE — TELEPHONE ENCOUNTER
"Endoscopy Scheduling Screen    Caller: patient    Have you had any respiratory illness or flu-like symptoms in the last 10 days?  No    What is your communication preference for Instructions and/or Bowel Prep?   MyChart    What insurance is in the chart?  Other:  BCBS    Ordering/Referring Provider: MARY ALVARADO    (If ordering provider performs procedure, schedule with ordering provider unless otherwise instructed. )    BMI: Estimated body mass index is 27.98 kg/m  as calculated from the following:    Height as of 9/12/24: 1.727 m (5' 8\").    Weight as of 11/5/24: 83.5 kg (184 lb).     Sedation Ordered  MAC/deep sedation.   BMI<= 45 45 < BMI <= 48 48 < BMI < = 50  BMI > 50   No Restrictions No [facility]  No [facility]  [facility] with exceptions Hospital Only OR Only       Do you have a history of malignant hyperthermia?  No    (Females) Are you currently pregnant?   No     Have you been diagnosed or told you have pulmonary hypertension?   No    Do you have an LVAD?  No    Have you been told you have moderate to severe sleep apnea?  No.    Have you been told you have COPD, asthma, or any other lung disease?  No    Has your doctor ordered any cardiac tests like echo, angiogram, stress test, ablation, or EKG, that you have not completed yet?  No    Do you  have a history of any heart conditions?  Yes - AB    Have you had any hospitalizations  in the last year for heart related issues, for example a stent placement, heart attack, or cardiomyopathy?  No    Do you have any implantable devices in your body (pacemaker, ICD)?  No    Do you take nitroglycerine?  No    Have you ever had or are you waiting for an organ transplant?  No. Continue scheduling, no site restrictions.    Have you had a stroke or transient ischemic attack (TIA aka \"mini stroke\") in the last 2 years?   No.    Have you been diagnosed with or been told you have cirrhosis of the liver?   No.    Are you currently on dialysis?   No    Do you need " "assistance transferring?   No    BMI: Estimated body mass index is 27.98 kg/m  as calculated from the following:    Height as of 9/12/24: 1.727 m (5' 8\").    Weight as of 11/5/24: 83.5 kg (184 lb).     Is patients BMI > 40 and scheduling location [facility]?  No    Do you take an injectable or oral medication for weight loss or diabetes (excluding insulin)?  No    Do you take the medication Naltrexone?  No    Do you take blood thinners?  No       Prep   Are you currently on dialysis or do you have chronic kidney disease?  No    Do you have a diagnosis of diabetes?  No    Do you have a diagnosis of cystic fibrosis (CF)?  No    On a regular basis do you go 3 -5 days between bowel movements?  No    BMI > 40?  No    Preferred Pharmacy:    [id number] [place] - [address]  [address] [place] [address]  Phone: [phone] Fax: [phone]      Final Scheduling Details     - All locations offered were too far from patient home. Pt is reach out to a facility closer to his home to schedule and is to call back if anything changes  "

## 2025-06-27 ENCOUNTER — HOSPITAL ENCOUNTER (OUTPATIENT)
Facility: CLINIC | Age: 65
Discharge: HOME | End: 2025-06-27
Attending: SURGERY | Admitting: SURGERY
Payer: COMMERCIAL

## 2025-06-27 VITALS
RESPIRATION RATE: 16 BRPM | TEMPERATURE: 97.7 F | WEIGHT: 184 LBS | HEART RATE: 67 BPM | OXYGEN SATURATION: 94 % | SYSTOLIC BLOOD PRESSURE: 98 MMHG | HEIGHT: 68 IN | DIASTOLIC BLOOD PRESSURE: 68 MMHG | BODY MASS INDEX: 27.89 KG/M2

## 2025-06-27 LAB — COLONOSCOPY: NORMAL

## 2025-06-27 PROCEDURE — 88305 TISSUE EXAM BY PATHOLOGIST: CPT | Mod: 26 | Performed by: PATHOLOGY

## 2025-06-27 PROCEDURE — 45380 COLONOSCOPY AND BIOPSY: CPT | Performed by: SURGERY

## 2025-06-27 PROCEDURE — 258N000003 HC RX IP 258 OP 636: Performed by: NURSE ANESTHETIST, CERTIFIED REGISTERED

## 2025-06-27 PROCEDURE — 88305 TISSUE EXAM BY PATHOLOGIST: CPT | Mod: TC | Performed by: SURGERY

## 2025-06-27 PROCEDURE — 45385 COLONOSCOPY W/LESION REMOVAL: CPT | Performed by: SURGERY

## 2025-06-27 PROCEDURE — 370N000017 HC ANESTHESIA TECHNICAL FEE, PER MIN: Performed by: SURGERY

## 2025-06-27 RX ORDER — ONDANSETRON 4 MG/1
4 TABLET, ORALLY DISINTEGRATING ORAL EVERY 30 MIN PRN
Status: DISCONTINUED | OUTPATIENT
Start: 2025-06-27 | End: 2025-06-27 | Stop reason: HOSPADM

## 2025-06-27 RX ORDER — DEXAMETHASONE SODIUM PHOSPHATE 4 MG/ML
4 INJECTION, SOLUTION INTRA-ARTICULAR; INTRALESIONAL; INTRAMUSCULAR; INTRAVENOUS; SOFT TISSUE
Status: DISCONTINUED | OUTPATIENT
Start: 2025-06-27 | End: 2025-06-27 | Stop reason: HOSPADM

## 2025-06-27 RX ORDER — ONDANSETRON 2 MG/ML
4 INJECTION INTRAMUSCULAR; INTRAVENOUS EVERY 30 MIN PRN
Status: DISCONTINUED | OUTPATIENT
Start: 2025-06-27 | End: 2025-06-27 | Stop reason: HOSPADM

## 2025-06-27 RX ORDER — NALOXONE HYDROCHLORIDE 0.4 MG/ML
0.1 INJECTION, SOLUTION INTRAMUSCULAR; INTRAVENOUS; SUBCUTANEOUS
Status: DISCONTINUED | OUTPATIENT
Start: 2025-06-27 | End: 2025-06-27 | Stop reason: HOSPADM

## 2025-06-27 RX ORDER — OXYCODONE HYDROCHLORIDE 5 MG/1
5 TABLET ORAL
Status: DISCONTINUED | OUTPATIENT
Start: 2025-06-27 | End: 2025-06-27 | Stop reason: HOSPADM

## 2025-06-27 RX ORDER — SODIUM CHLORIDE, SODIUM LACTATE, POTASSIUM CHLORIDE, CALCIUM CHLORIDE 600; 310; 30; 20 MG/100ML; MG/100ML; MG/100ML; MG/100ML
INJECTION, SOLUTION INTRAVENOUS CONTINUOUS
Status: DISCONTINUED | OUTPATIENT
Start: 2025-06-27 | End: 2025-06-27 | Stop reason: HOSPADM

## 2025-06-27 RX ORDER — LIDOCAINE 40 MG/G
CREAM TOPICAL
Status: DISCONTINUED | OUTPATIENT
Start: 2025-06-27 | End: 2025-06-27 | Stop reason: HOSPADM

## 2025-06-27 RX ORDER — ONDANSETRON 2 MG/ML
4 INJECTION INTRAMUSCULAR; INTRAVENOUS
Status: DISCONTINUED | OUTPATIENT
Start: 2025-06-27 | End: 2025-06-27 | Stop reason: HOSPADM

## 2025-06-27 RX ORDER — OXYCODONE HYDROCHLORIDE 5 MG/1
10 TABLET ORAL
Status: DISCONTINUED | OUTPATIENT
Start: 2025-06-27 | End: 2025-06-27 | Stop reason: HOSPADM

## 2025-06-27 RX ADMIN — SODIUM CHLORIDE, POTASSIUM CHLORIDE, SODIUM LACTATE AND CALCIUM CHLORIDE 100 ML: 600; 310; 30; 20 INJECTION, SOLUTION INTRAVENOUS at 09:30

## 2025-06-27 ASSESSMENT — ACTIVITIES OF DAILY LIVING (ADL)
ADLS_ACUITY_SCORE: 52

## 2025-06-27 NOTE — H&P
[facility]    Pre-Endoscopy History and Physical     Lan Manuel MRN# [id number]   YOB: 1960 Age: 64 year old     Date of Procedure: 6/27/2025  Primary care provider: Jenniffer Matt  Type of Endoscopy: Colonoscopy with possible biopsy, possible polypectomy  Reason for Procedure: Screening  Type of Anesthesia Anticipated: MAC    HPI:    Lan is a 64 year old male who will be undergoing the above procedure.  Screening colonoscopy. Last one 2021 (two polyps, cecal diverticulosis). No family hx colon ca. No AC. ASA II.      A history and physical has been performed. The patient's medications and allergies have been reviewed. The risks and benefits of the procedure and the sedation options and risks were discussed with the patient.  All questions were answered and informed consent was obtained.      He denies a personal or family history of anesthesia complications or bleeding disorders.     Patient Active Problem List   Diagnosis    Essential hypertension    Multiple rib fractures    Nicotine dependence    Left scapula fracture    Diverticulitis    Diverticulitis of colon    Appendicular diverticulum    Alcohol use    Adenomatous colon polyp    Ganglion    Atrial fibrillation with RVR (H)    New onset atrial fibrillation (H)    Typical atrial flutter (H)    Prediabetes    Persistent atrial fibrillation (H)    S/P ablation of atrial fibrillation        Past Medical History:   Diagnosis Date    Atrial fibrillation with RVR (H) 10/10/2023    Hypertension 8/9/2015        Past Surgical History:   Procedure Laterality Date    COLONOSCOPY N/A 3/2/2021    Procedure: COLONOSCOPY, WITH POLYPECTOMY AND BIOPSY;  Surgeon: Francis Chow MD;  Location: [facility]    EP ABLATION PULMONARY VEIN ISOLATION N/A 11/8/2023    Procedure: Ablation Atrial Fibrillation;  Surgeon: Tu Richard MD;  Location: [facility] CATH LAB CV    IR LUMBAR EPIDURAL STEROID INJECTION  1/30/2007       Social  "History     Tobacco Use    Smoking status: Every Day     Current packs/day: 1.00     Types: Cigarettes    Smokeless tobacco: Never   Substance Use Topics    Alcohol use: Yes     Comment: 6/day       Family History   Problem Relation Age of Onset    Myocardial Infarction Mother 37         of MI at 37    No Known Problems Father     Heart Disease Mother 37.00    Allergies Father        Prior to Admission medications    Medication Sig Start Date End Date Taking? Authorizing Provider   aspirin 81 MG EC tablet Take 1 tablet (81 mg) by mouth daily 23   Tu Richard MD   atorvastatin (LIPITOR) 40 MG tablet Take 1 tablet (40 mg) by mouth every evening. 24   Lesia Green,    lisinopril (ZESTRIL) 20 MG tablet Take 1 tablet (20 mg) by mouth daily. TAKE 1 TABLET (10 MG) BY MOUTH DAILY 24   Lesia Green, DO       No Known Allergies     REVIEW OF SYSTEMS:   5 point ROS negative except as noted above in HPI, including Gen., Resp., CV, GI &  system review.    PHYSICAL EXAM:   BP (!) 114/90 (BP Location: Left arm)   Pulse 83   Temp 98.2  F (36.8  C) (Oral)   Ht 1.727 m (5' 8\")   Wt 83.5 kg (184 lb)   SpO2 97%   BMI 27.98 kg/m   Estimated body mass index is 27.98 kg/m  as calculated from the following:    Height as of this encounter: 1.727 m (5' 8\").    Weight as of this encounter: 83.5 kg (184 lb).   Constitutional: Awake, alert, no acute distress.  Eyes: No scleral icterus.  Conjunctiva are without injection.  ENMT: Mucous membranes moist, dentition and gums are intact.   Neck: Soft, supple, trachea midline.    Endocrine: n/a   Lymphatic: There is no cervical, submandibularadenopathy.  Respiratory: normal effort   Cardiovascular: S1, S2  Abdomen: Non-distended, non-tender,  No masses,  Musculoskeletal: No spinal or CVA tenderness. Full range of motion in the upper and lower extremities.    Skin: No skin rashes or lesions to inspection.  No petechia.    Neurologic: alerted and oriented " 3x  Psychiatric: The patient's affect is not blunted and mood is appropriate.  DIAGNOSTICS:    Not indicated    IMPRESSION   ASA Class 2 - Mild systemic disease    PLAN:   Plan for Colonoscopy with possible biopsy, possible polypectomy. We discussed the risks, benefits and alternatives and the patient wished to proceed.  Patient is cleared for the above procedure.    The above has been forwarded to the attending physician. Any changes will be mentioned in their attestation.    Michelle Tamez MD PGY-2  General Surgery Resident

## 2025-07-03 LAB
PATH REPORT.COMMENTS IMP SPEC: NORMAL
PATH REPORT.COMMENTS IMP SPEC: NORMAL
PATH REPORT.FINAL DX SPEC: NORMAL
PATH REPORT.GROSS SPEC: NORMAL
PATH REPORT.MICROSCOPIC SPEC OTHER STN: NORMAL
PATH REPORT.RELEVANT HX SPEC: NORMAL
PHOTO IMAGE: NORMAL

## 2025-07-09 ENCOUNTER — RESULTS FOLLOW-UP (OUTPATIENT)
Dept: SURGERY | Facility: CLINIC | Age: 65
End: 2025-07-09
Payer: COMMERCIAL

## 2025-08-26 ENCOUNTER — TELEPHONE (OUTPATIENT)
Dept: FAMILY MEDICINE | Facility: CLINIC | Age: 65
End: 2025-08-26
Payer: COMMERCIAL

## 2025-08-27 DIAGNOSIS — I10 PRIMARY HYPERTENSION: ICD-10-CM

## 2025-08-27 RX ORDER — LISINOPRIL 20 MG/1
20 TABLET ORAL DAILY
Qty: 90 TABLET | Refills: 0 | Status: SHIPPED | OUTPATIENT
Start: 2025-08-27

## (undated) DEVICE — 8F SOUNDSTAR ECO ULTRASOUND CATHETER

## (undated) DEVICE — CUSTOM PACK EP

## (undated) DEVICE — INTRODUCER SHEATH VASC CATH 8.5FR CARTO GIDE STH MED D138502

## (undated) DEVICE — INTRO SHTH BLND STRBL BIDIR W/ GW 12F STD 65CM SS120065

## (undated) DEVICE — CATH UMBILICAL COAX CBL 203CXC

## (undated) DEVICE — TRANSDUCER W/MONITORING TRAY 42632-05

## (undated) DEVICE — CATH ARCTIC FRONT ADVANCE 2AF284

## (undated) DEVICE — ELECTRODE DEFIB CADENCE 22550R

## (undated) DEVICE — ENDO SNARE EXACTO COLD 9MM LOOP 2.4MMX230CM 00711115

## (undated) DEVICE — TUBE SET SMARKABLATE IRRIGATION

## (undated) DEVICE — SHEATH PINNACLE 9/25/038 RSS905

## (undated) DEVICE — CATH TRANSSEPTAL VERSACROSS 45D 63X230CM VXSK0032

## (undated) DEVICE — GUIDEWIRE JTIP 3MM .035 180CM IQ35F180J3

## (undated) DEVICE — WIRE GUIDE 0.035"X260CM AMPTLAZ XSTIFF CVD THSCF-35-260-3-A

## (undated) DEVICE — CATH EP 7FR X 115CM DECANAV CA

## (undated) DEVICE — CATH THERMOCOOL SMARTTOUCH SF DF CURVE

## (undated) DEVICE — INTRO SHEATH TERUMO 10FRX25CM PINNACLE RSS006

## (undated) DEVICE — INTRO MICRO MINI STICK 5FR STIFF NITINOL

## (undated) DEVICE — Device

## (undated) DEVICE — PATCH CARTO 3 EXTERNAL REFERENCE 3D MAPPING CREFP6

## (undated) DEVICE — CABLE UMBILICAL CRYOABLATION ELEC

## (undated) DEVICE — CATHETER OCTARAY LONG SPLINE CURVE F 3-3-3-3-3 D160906

## (undated) DEVICE — TRANSDUCER TRAY ARTERIAL 42646-06

## (undated) RX ORDER — LIDOCAINE HYDROCHLORIDE 10 MG/ML
INJECTION, SOLUTION EPIDURAL; INFILTRATION; INTRACAUDAL; PERINEURAL
Status: DISPENSED
Start: 2021-03-02

## (undated) RX ORDER — FENTANYL CITRATE-0.9 % NACL/PF 10 MCG/ML
PLASTIC BAG, INJECTION (ML) INTRAVENOUS
Status: DISPENSED
Start: 2023-11-08

## (undated) RX ORDER — ONDANSETRON 2 MG/ML
INJECTION INTRAMUSCULAR; INTRAVENOUS
Status: DISPENSED
Start: 2023-11-08

## (undated) RX ORDER — ALBUTEROL SULFATE 90 UG/1
AEROSOL, METERED RESPIRATORY (INHALATION)
Status: DISPENSED
Start: 2023-11-08

## (undated) RX ORDER — PROTAMINE SULFATE 10 MG/ML
INJECTION, SOLUTION INTRAVENOUS
Status: DISPENSED
Start: 2023-11-08

## (undated) RX ORDER — LIDOCAINE HYDROCHLORIDE 10 MG/ML
INJECTION, SOLUTION EPIDURAL; INFILTRATION; INTRACAUDAL; PERINEURAL
Status: DISPENSED
Start: 2023-11-08

## (undated) RX ORDER — PROPOFOL 10 MG/ML
INJECTION, EMULSION INTRAVENOUS
Status: DISPENSED
Start: 2023-11-08

## (undated) RX ORDER — PROPOFOL 10 MG/ML
INJECTION, EMULSION INTRAVENOUS
Status: DISPENSED
Start: 2025-06-27

## (undated) RX ORDER — FENTANYL CITRATE 50 UG/ML
INJECTION, SOLUTION INTRAMUSCULAR; INTRAVENOUS
Status: DISPENSED
Start: 2023-11-08

## (undated) RX ORDER — PROPOFOL 10 MG/ML
INJECTION, EMULSION INTRAVENOUS
Status: DISPENSED
Start: 2021-03-02

## (undated) RX ORDER — FUROSEMIDE 10 MG/ML
INJECTION INTRAMUSCULAR; INTRAVENOUS
Status: DISPENSED
Start: 2023-11-08

## (undated) RX ORDER — METHOHEXITAL IN WATER/PF 100MG/10ML
SYRINGE (ML) INTRAVENOUS
Status: DISPENSED
Start: 2023-10-13

## (undated) RX ORDER — DEXAMETHASONE SODIUM PHOSPHATE 10 MG/ML
INJECTION, SOLUTION INTRAMUSCULAR; INTRAVENOUS
Status: DISPENSED
Start: 2023-11-08

## (undated) RX ORDER — HEPARIN SODIUM 10000 [USP'U]/100ML
INJECTION, SOLUTION INTRAVENOUS
Status: DISPENSED
Start: 2023-11-08